# Patient Record
Sex: FEMALE | Race: WHITE | NOT HISPANIC OR LATINO | Employment: OTHER | URBAN - METROPOLITAN AREA
[De-identification: names, ages, dates, MRNs, and addresses within clinical notes are randomized per-mention and may not be internally consistent; named-entity substitution may affect disease eponyms.]

---

## 2017-02-23 ENCOUNTER — COMMUNICATION - HEALTHEAST (OUTPATIENT)
Dept: FAMILY MEDICINE | Facility: CLINIC | Age: 62
End: 2017-02-23

## 2017-08-21 ENCOUNTER — COMMUNICATION - HEALTHEAST (OUTPATIENT)
Dept: FAMILY MEDICINE | Facility: CLINIC | Age: 62
End: 2017-08-21

## 2017-08-21 ENCOUNTER — OFFICE VISIT - HEALTHEAST (OUTPATIENT)
Dept: SURGERY | Facility: CLINIC | Age: 62
End: 2017-08-21

## 2017-08-21 DIAGNOSIS — K80.20 SYMPTOMATIC CHOLELITHIASIS: ICD-10-CM

## 2017-08-21 ASSESSMENT — MIFFLIN-ST. JEOR: SCORE: 1351.01

## 2017-08-23 ENCOUNTER — AMBULATORY - HEALTHEAST (OUTPATIENT)
Dept: SURGERY | Facility: CLINIC | Age: 62
End: 2017-08-23

## 2017-08-28 ENCOUNTER — OFFICE VISIT - HEALTHEAST (OUTPATIENT)
Dept: FAMILY MEDICINE | Facility: CLINIC | Age: 62
End: 2017-08-28

## 2017-08-28 DIAGNOSIS — Z01.818 PRE-OP EXAM: ICD-10-CM

## 2017-08-28 DIAGNOSIS — K82.9 GALL BLADDER DISEASE: ICD-10-CM

## 2017-08-28 DIAGNOSIS — K29.60 REFLUX GASTRITIS: ICD-10-CM

## 2017-08-28 DIAGNOSIS — N32.81 OAB (OVERACTIVE BLADDER): ICD-10-CM

## 2017-08-28 ASSESSMENT — MIFFLIN-ST. JEOR: SCORE: 1361.9

## 2017-08-31 ENCOUNTER — AMBULATORY - HEALTHEAST (OUTPATIENT)
Dept: VASCULAR SURGERY | Facility: CLINIC | Age: 62
End: 2017-08-31

## 2017-08-31 ENCOUNTER — RECORDS - HEALTHEAST (OUTPATIENT)
Dept: ADMINISTRATIVE | Facility: OTHER | Age: 62
End: 2017-08-31

## 2017-08-31 DIAGNOSIS — K80.20 CALCULUS OF GALLBLADDER WITHOUT CHOLECYSTITIS WITHOUT OBSTRUCTION: ICD-10-CM

## 2017-09-06 ENCOUNTER — COMMUNICATION - HEALTHEAST (OUTPATIENT)
Dept: SURGERY | Facility: CLINIC | Age: 62
End: 2017-09-06

## 2020-03-09 ENCOUNTER — OFFICE VISIT - HEALTHEAST (OUTPATIENT)
Dept: FAMILY MEDICINE | Facility: CLINIC | Age: 65
End: 2020-03-09

## 2020-03-09 DIAGNOSIS — Z72.0 BRONCHITIS DUE TO TOBACCO USE: ICD-10-CM

## 2020-03-09 DIAGNOSIS — J40 BRONCHITIS DUE TO TOBACCO USE: ICD-10-CM

## 2020-03-09 DIAGNOSIS — R19.4 BOWEL HABIT CHANGES: ICD-10-CM

## 2020-03-09 DIAGNOSIS — Z00.00 ROUTINE GENERAL MEDICAL EXAMINATION AT A HEALTH CARE FACILITY: ICD-10-CM

## 2020-03-09 DIAGNOSIS — Z12.11 SCREEN FOR COLON CANCER: ICD-10-CM

## 2020-03-09 DIAGNOSIS — Z12.31 VISIT FOR SCREENING MAMMOGRAM: ICD-10-CM

## 2020-03-09 LAB
ALBUMIN SERPL-MCNC: 4 G/DL (ref 3.5–5)
ALP SERPL-CCNC: 103 U/L (ref 45–120)
ALT SERPL W P-5'-P-CCNC: 13 U/L (ref 0–45)
ANION GAP SERPL CALCULATED.3IONS-SCNC: 10 MMOL/L (ref 5–18)
AST SERPL W P-5'-P-CCNC: 20 U/L (ref 0–40)
ATRIAL RATE - MUSE: 99 BPM
BILIRUB SERPL-MCNC: 0.5 MG/DL (ref 0–1)
BUN SERPL-MCNC: 7 MG/DL (ref 8–22)
CALCIUM SERPL-MCNC: 9.9 MG/DL (ref 8.5–10.5)
CHLORIDE BLD-SCNC: 102 MMOL/L (ref 98–107)
CO2 SERPL-SCNC: 27 MMOL/L (ref 22–31)
CREAT SERPL-MCNC: 0.88 MG/DL (ref 0.6–1.1)
DIASTOLIC BLOOD PRESSURE - MUSE: NORMAL
ERYTHROCYTE [DISTWIDTH] IN BLOOD BY AUTOMATED COUNT: 11.9 % (ref 11–14.5)
GFR SERPL CREATININE-BSD FRML MDRD: >60 ML/MIN/1.73M2
GLUCOSE BLD-MCNC: 107 MG/DL (ref 70–125)
HCT VFR BLD AUTO: 41.6 % (ref 35–47)
HGB BLD-MCNC: 13.9 G/DL (ref 12–16)
INTERPRETATION ECG - MUSE: NORMAL
MCH RBC QN AUTO: 33.1 PG (ref 27–34)
MCHC RBC AUTO-ENTMCNC: 33.5 G/DL (ref 32–36)
MCV RBC AUTO: 99 FL (ref 80–100)
P AXIS - MUSE: 53 DEGREES
PLATELET # BLD AUTO: 268 THOU/UL (ref 140–440)
PMV BLD AUTO: 8.1 FL (ref 7–10)
POTASSIUM BLD-SCNC: 4.6 MMOL/L (ref 3.5–5)
PR INTERVAL - MUSE: 106 MS
PROT SERPL-MCNC: 7.8 G/DL (ref 6–8)
QRS DURATION - MUSE: 86 MS
QT - MUSE: 348 MS
QTC - MUSE: 446 MS
R AXIS - MUSE: 70 DEGREES
RBC # BLD AUTO: 4.2 MILL/UL (ref 3.8–5.4)
SODIUM SERPL-SCNC: 139 MMOL/L (ref 136–145)
SYSTOLIC BLOOD PRESSURE - MUSE: NORMAL
T AXIS - MUSE: 47 DEGREES
TSH SERPL DL<=0.005 MIU/L-ACNC: 1.25 UIU/ML (ref 0.3–5)
VENTRICULAR RATE- MUSE: 99 BPM
WBC: 6.8 THOU/UL (ref 4–11)

## 2020-03-09 ASSESSMENT — ANXIETY QUESTIONNAIRES
3. WORRYING TOO MUCH ABOUT DIFFERENT THINGS: NEARLY EVERY DAY
IF YOU CHECKED OFF ANY PROBLEMS ON THIS QUESTIONNAIRE, HOW DIFFICULT HAVE THESE PROBLEMS MADE IT FOR YOU TO DO YOUR WORK, TAKE CARE OF THINGS AT HOME, OR GET ALONG WITH OTHER PEOPLE: VERY DIFFICULT
2. NOT BEING ABLE TO STOP OR CONTROL WORRYING: SEVERAL DAYS
1. FEELING NERVOUS, ANXIOUS, OR ON EDGE: NEARLY EVERY DAY
5. BEING SO RESTLESS THAT IT IS HARD TO SIT STILL: NEARLY EVERY DAY
7. FEELING AFRAID AS IF SOMETHING AWFUL MIGHT HAPPEN: SEVERAL DAYS
6. BECOMING EASILY ANNOYED OR IRRITABLE: NEARLY EVERY DAY
4. TROUBLE RELAXING: NEARLY EVERY DAY
GAD7 TOTAL SCORE: 17

## 2020-03-09 ASSESSMENT — PATIENT HEALTH QUESTIONNAIRE - PHQ9: SUM OF ALL RESPONSES TO PHQ QUESTIONS 1-9: 13

## 2020-03-09 ASSESSMENT — MIFFLIN-ST. JEOR: SCORE: 1267.1

## 2020-03-14 ENCOUNTER — HOSPITAL ENCOUNTER (OUTPATIENT)
Dept: CT IMAGING | Facility: CLINIC | Age: 65
Discharge: HOME | End: 2020-03-14
Attending: FAMILY MEDICINE

## 2020-03-14 DIAGNOSIS — J40 BRONCHITIS DUE TO TOBACCO USE: ICD-10-CM

## 2020-03-14 DIAGNOSIS — Z72.0 BRONCHITIS DUE TO TOBACCO USE: ICD-10-CM

## 2020-03-16 ENCOUNTER — COMMUNICATION - HEALTHEAST (OUTPATIENT)
Dept: FAMILY MEDICINE | Facility: CLINIC | Age: 65
End: 2020-03-16

## 2020-03-18 ENCOUNTER — OFFICE VISIT - HEALTHEAST (OUTPATIENT)
Dept: FAMILY MEDICINE | Facility: CLINIC | Age: 65
End: 2020-03-18

## 2020-03-18 DIAGNOSIS — F41.9 ANXIETY AND DEPRESSION: ICD-10-CM

## 2020-03-18 DIAGNOSIS — J41.0 SIMPLE CHRONIC BRONCHITIS (H): ICD-10-CM

## 2020-03-18 DIAGNOSIS — F32.A ANXIETY AND DEPRESSION: ICD-10-CM

## 2020-03-18 DIAGNOSIS — Z72.0 TOBACCO ABUSE: ICD-10-CM

## 2020-07-20 ENCOUNTER — COMMUNICATION - HEALTHEAST (OUTPATIENT)
Dept: SCHEDULING | Facility: CLINIC | Age: 65
End: 2020-07-20

## 2020-07-21 ENCOUNTER — COMMUNICATION - HEALTHEAST (OUTPATIENT)
Dept: FAMILY MEDICINE | Facility: CLINIC | Age: 65
End: 2020-07-21

## 2020-07-21 ENCOUNTER — OFFICE VISIT - HEALTHEAST (OUTPATIENT)
Dept: FAMILY MEDICINE | Facility: CLINIC | Age: 65
End: 2020-07-21

## 2020-07-21 DIAGNOSIS — F41.1 GENERALIZED ANXIETY DISORDER: ICD-10-CM

## 2020-07-21 DIAGNOSIS — R13.10 DYSPHAGIA, UNSPECIFIED TYPE: ICD-10-CM

## 2020-07-21 DIAGNOSIS — M79.641 PAIN IN BOTH HANDS: ICD-10-CM

## 2020-07-21 DIAGNOSIS — R47.89 SPELL OF CHANGE IN SPEECH: ICD-10-CM

## 2020-07-21 DIAGNOSIS — M79.642 PAIN IN BOTH HANDS: ICD-10-CM

## 2020-07-21 LAB
ALBUMIN SERPL-MCNC: 4.2 G/DL (ref 3.5–5)
ALP SERPL-CCNC: 97 U/L (ref 45–120)
ALT SERPL W P-5'-P-CCNC: 12 U/L (ref 0–45)
ANION GAP SERPL CALCULATED.3IONS-SCNC: 11 MMOL/L (ref 5–18)
AST SERPL W P-5'-P-CCNC: 19 U/L (ref 0–40)
BILIRUB SERPL-MCNC: 0.3 MG/DL (ref 0–1)
BUN SERPL-MCNC: 7 MG/DL (ref 8–22)
CALCIUM SERPL-MCNC: 9.8 MG/DL (ref 8.5–10.5)
CHLORIDE BLD-SCNC: 103 MMOL/L (ref 98–107)
CO2 SERPL-SCNC: 26 MMOL/L (ref 22–31)
CREAT SERPL-MCNC: 0.77 MG/DL (ref 0.6–1.1)
ERYTHROCYTE [DISTWIDTH] IN BLOOD BY AUTOMATED COUNT: 13.2 % (ref 11–14.5)
GFR SERPL CREATININE-BSD FRML MDRD: >60 ML/MIN/1.73M2
GLUCOSE BLD-MCNC: 94 MG/DL (ref 70–125)
HCT VFR BLD AUTO: 44.8 % (ref 35–47)
HGB BLD-MCNC: 14.5 G/DL (ref 12–16)
MCH RBC QN AUTO: 32.1 PG (ref 27–34)
MCHC RBC AUTO-ENTMCNC: 32.4 G/DL (ref 32–36)
MCV RBC AUTO: 99 FL (ref 80–100)
PLATELET # BLD AUTO: 273 THOU/UL (ref 140–440)
PMV BLD AUTO: 12.1 FL (ref 8.5–12.5)
POTASSIUM BLD-SCNC: 5.1 MMOL/L (ref 3.5–5)
PROT SERPL-MCNC: 8 G/DL (ref 6–8)
RBC # BLD AUTO: 4.52 MILL/UL (ref 3.8–5.4)
SODIUM SERPL-SCNC: 140 MMOL/L (ref 136–145)
TSH SERPL DL<=0.005 MIU/L-ACNC: 1.32 UIU/ML (ref 0.3–5)
WBC: 8.1 THOU/UL (ref 4–11)

## 2020-07-21 ASSESSMENT — ANXIETY QUESTIONNAIRES
5. BEING SO RESTLESS THAT IT IS HARD TO SIT STILL: NEARLY EVERY DAY
GAD7 TOTAL SCORE: 19
6. BECOMING EASILY ANNOYED OR IRRITABLE: SEVERAL DAYS
3. WORRYING TOO MUCH ABOUT DIFFERENT THINGS: NEARLY EVERY DAY
2. NOT BEING ABLE TO STOP OR CONTROL WORRYING: NEARLY EVERY DAY
4. TROUBLE RELAXING: NEARLY EVERY DAY
1. FEELING NERVOUS, ANXIOUS, OR ON EDGE: NEARLY EVERY DAY
7. FEELING AFRAID AS IF SOMETHING AWFUL MIGHT HAPPEN: NEARLY EVERY DAY

## 2020-07-21 ASSESSMENT — PATIENT HEALTH QUESTIONNAIRE - PHQ9: SUM OF ALL RESPONSES TO PHQ QUESTIONS 1-9: 19

## 2020-07-31 ENCOUNTER — AMBULATORY - HEALTHEAST (OUTPATIENT)
Dept: FAMILY MEDICINE | Facility: CLINIC | Age: 65
End: 2020-07-31

## 2020-08-04 ENCOUNTER — HOSPITAL ENCOUNTER (OUTPATIENT)
Dept: CT IMAGING | Facility: CLINIC | Age: 65
Discharge: HOME | End: 2020-08-04
Attending: FAMILY MEDICINE

## 2020-08-04 DIAGNOSIS — R47.89 SPELL OF CHANGE IN SPEECH: ICD-10-CM

## 2020-08-04 DIAGNOSIS — R13.10 DYSPHAGIA, UNSPECIFIED TYPE: ICD-10-CM

## 2020-08-05 ENCOUNTER — AMBULATORY - HEALTHEAST (OUTPATIENT)
Dept: FAMILY MEDICINE | Facility: CLINIC | Age: 65
End: 2020-08-05

## 2020-08-05 DIAGNOSIS — R93.89 ABNORMAL CT SCAN, NECK: ICD-10-CM

## 2020-08-13 ENCOUNTER — RECORDS - HEALTHEAST (OUTPATIENT)
Dept: ADMINISTRATIVE | Facility: OTHER | Age: 65
End: 2020-08-13

## 2020-08-20 ENCOUNTER — OFFICE VISIT - HEALTHEAST (OUTPATIENT)
Dept: OTOLARYNGOLOGY | Facility: CLINIC | Age: 65
End: 2020-08-20

## 2020-08-20 DIAGNOSIS — R13.10 DYSPHAGIA, UNSPECIFIED TYPE: ICD-10-CM

## 2020-08-20 DIAGNOSIS — Z72.0 TOBACCO ABUSE DISORDER: ICD-10-CM

## 2020-08-20 DIAGNOSIS — K21.9 LPRD (LARYNGOPHARYNGEAL REFLUX DISEASE): ICD-10-CM

## 2020-08-20 DIAGNOSIS — R09.A2 GLOBUS SENSATION: ICD-10-CM

## 2020-08-20 DIAGNOSIS — R49.0 HOARSENESS: ICD-10-CM

## 2021-01-01 ENCOUNTER — LAB (OUTPATIENT)
Dept: LAB | Facility: CLINIC | Age: 66
End: 2021-01-01
Payer: COMMERCIAL

## 2021-01-01 ENCOUNTER — OFFICE VISIT (OUTPATIENT)
Dept: NEUROLOGY | Facility: CLINIC | Age: 66
End: 2021-01-01
Payer: COMMERCIAL

## 2021-01-01 ENCOUNTER — DOCUMENTATION ONLY (OUTPATIENT)
Dept: NEUROLOGY | Facility: CLINIC | Age: 66
End: 2021-01-01

## 2021-01-01 ENCOUNTER — THERAPY VISIT (OUTPATIENT)
Dept: SPEECH THERAPY | Facility: CLINIC | Age: 66
End: 2021-01-01
Payer: COMMERCIAL

## 2021-01-01 ENCOUNTER — ALLIED HEALTH/NURSE VISIT (OUTPATIENT)
Dept: NEUROLOGY | Facility: CLINIC | Age: 66
End: 2021-01-01

## 2021-01-01 VITALS
SYSTOLIC BLOOD PRESSURE: 152 MMHG | RESPIRATION RATE: 16 BRPM | BODY MASS INDEX: 25.13 KG/M2 | OXYGEN SATURATION: 96 % | DIASTOLIC BLOOD PRESSURE: 87 MMHG | HEART RATE: 69 BPM | WEIGHT: 146.4 LBS

## 2021-01-01 DIAGNOSIS — R13.12 OROPHARYNGEAL DYSPHAGIA: ICD-10-CM

## 2021-01-01 DIAGNOSIS — G12.21 ALS (AMYOTROPHIC LATERAL SCLEROSIS) (H): ICD-10-CM

## 2021-01-01 DIAGNOSIS — G12.21 ALS (AMYOTROPHIC LATERAL SCLEROSIS) (H): Primary | ICD-10-CM

## 2021-01-01 DIAGNOSIS — T88.7XXA MEDICATION SIDE EFFECTS: Primary | ICD-10-CM

## 2021-01-01 DIAGNOSIS — T88.7XXA MEDICATION SIDE EFFECTS: ICD-10-CM

## 2021-01-01 DIAGNOSIS — R47.1 DYSARTHRIA: ICD-10-CM

## 2021-01-01 DIAGNOSIS — K11.7 SIALORRHEA: ICD-10-CM

## 2021-01-01 LAB
ALBUMIN SERPL-MCNC: 3.7 G/DL (ref 3.4–5)
ALP SERPL-CCNC: 167 U/L (ref 40–150)
ALT SERPL W P-5'-P-CCNC: 40 U/L (ref 0–50)
AST SERPL W P-5'-P-CCNC: 33 U/L (ref 0–45)
BILIRUB DIRECT SERPL-MCNC: 0.2 MG/DL (ref 0–0.2)
BILIRUB SERPL-MCNC: 0.5 MG/DL (ref 0.2–1.3)
PROT SERPL-MCNC: 8 G/DL (ref 6.8–8.8)

## 2021-01-01 PROCEDURE — 94375 RESPIRATORY FLOW VOLUME LOOP: CPT | Performed by: INTERNAL MEDICINE

## 2021-01-01 PROCEDURE — 80076 HEPATIC FUNCTION PANEL: CPT | Performed by: PATHOLOGY

## 2021-01-01 PROCEDURE — 36415 COLL VENOUS BLD VENIPUNCTURE: CPT | Performed by: PATHOLOGY

## 2021-01-01 PROCEDURE — 92522 EVALUATE SPEECH PRODUCTION: CPT | Mod: GN | Performed by: SPEECH-LANGUAGE PATHOLOGIST

## 2021-01-01 PROCEDURE — 92526 ORAL FUNCTION THERAPY: CPT | Mod: GN | Performed by: SPEECH-LANGUAGE PATHOLOGIST

## 2021-01-01 PROCEDURE — 99215 OFFICE O/P EST HI 40 MIN: CPT | Mod: GC | Performed by: PSYCHIATRY & NEUROLOGY

## 2021-01-01 PROCEDURE — 92610 EVALUATE SWALLOWING FUNCTION: CPT | Mod: GN | Performed by: SPEECH-LANGUAGE PATHOLOGIST

## 2021-01-01 RX ORDER — ATROPINE SULFATE 10 MG/ML
1-2 SOLUTION/ DROPS OPHTHALMIC 2 TIMES DAILY PRN
Qty: 5 ML | Refills: 1 | Status: SHIPPED | OUTPATIENT
Start: 2021-01-01 | End: 2022-01-01

## 2021-01-01 RX ORDER — RILUZOLE 50 MG/1
50 TABLET, FILM COATED ORAL EVERY 12 HOURS
Qty: 60 TABLET | Refills: 2 | Status: ON HOLD | OUTPATIENT
Start: 2021-01-01 | End: 2022-01-01

## 2021-01-01 ASSESSMENT — REVISED AMYOTROPHIC LATERAL SCLEROSIS FUNCTIONAL RATING SCALE (ALSFRS-R)
ORTHOPENA: 4
SWALLOWING: 2
SPEECH: LOSS OF USEFUL SPEECH
HANDWRITING: NORMAL
HANDWRITING: 4
SALIVATION: 1
TURNING_IN_BED_AND_ADJUSTING_BED_CLOTHES: 3
TURNING_IN_BED_AND_ADJUSTING_BED_CLOTHES: SOMEWHAT SLOW AND CLUMSY, BUT NO HELP NEEDED
DYSPNEA: 4
GROSS_MOTOR_SUBTOTAL_SCORE: 10
DRESSING_AND_HYGEINE: 4
SPEECH: 0
WALKING: NORMAL
RESPIRATORY_SUBTOTAL_SCORE: 12
FINE_MOTOR_SUBTOTAL_SCORE: 12
ALSFRS_TOTAL_SCORE: 37
CLIMBING_STAIRS: SLOW
SALIVATION: MARKED EXCESS OF SALIVA WITH SOME DROOLING
RESPIRATORY_INSUFFICIENCY: 4
WALKING: 4
CLIMBING_STAIRS: 3
DRESSING_AND_HYGEINE: NORMAL FUNCTION
SWALLOWING: DIETARY CONSISTENCY CHANGES
SIX_ITEM_SUBTOTAL: 19
BULBAR_SUBTOTAL: 3
CUTTING_FOOD_AND_HANDLING_UTENSILES: 4

## 2021-01-01 ASSESSMENT — PAIN SCALES - GENERAL: PAINLEVEL: NO PAIN (0)

## 2021-01-15 ENCOUNTER — RECORDS - HEALTHEAST (OUTPATIENT)
Dept: ADMINISTRATIVE | Facility: OTHER | Age: 66
End: 2021-01-15

## 2021-01-27 ENCOUNTER — COMMUNICATION - HEALTHEAST (OUTPATIENT)
Dept: OTOLARYNGOLOGY | Facility: CLINIC | Age: 66
End: 2021-01-27

## 2021-02-11 ENCOUNTER — COMMUNICATION - HEALTHEAST (OUTPATIENT)
Dept: OTOLARYNGOLOGY | Facility: CLINIC | Age: 66
End: 2021-02-11

## 2021-02-11 DIAGNOSIS — K21.9 LPRD (LARYNGOPHARYNGEAL REFLUX DISEASE): ICD-10-CM

## 2021-02-19 ENCOUNTER — OFFICE VISIT - HEALTHEAST (OUTPATIENT)
Dept: FAMILY MEDICINE | Facility: CLINIC | Age: 66
End: 2021-02-19

## 2021-02-19 DIAGNOSIS — R29.818 OTHER SYMPTOMS AND SIGNS INVOLVING THE NERVOUS SYSTEM: ICD-10-CM

## 2021-02-19 DIAGNOSIS — R13.10 DYSPHAGIA, UNSPECIFIED TYPE: ICD-10-CM

## 2021-02-19 DIAGNOSIS — R47.1 DYSARTHRIA: ICD-10-CM

## 2021-02-19 DIAGNOSIS — Z72.0 TOBACCO ABUSE: ICD-10-CM

## 2021-02-19 LAB
ALBUMIN SERPL-MCNC: 4.1 G/DL (ref 3.5–5)
ALP SERPL-CCNC: 156 U/L (ref 45–120)
ALT SERPL W P-5'-P-CCNC: 60 U/L (ref 0–45)
ANION GAP SERPL CALCULATED.3IONS-SCNC: 10 MMOL/L (ref 5–18)
AST SERPL W P-5'-P-CCNC: 32 U/L (ref 0–40)
BILIRUB SERPL-MCNC: 0.3 MG/DL (ref 0–1)
BUN SERPL-MCNC: 15 MG/DL (ref 8–22)
C REACTIVE PROTEIN LHE: 0.1 MG/DL (ref 0–0.8)
CALCIUM SERPL-MCNC: 9.7 MG/DL (ref 8.5–10.5)
CHLORIDE BLD-SCNC: 104 MMOL/L (ref 98–107)
CO2 SERPL-SCNC: 28 MMOL/L (ref 22–31)
CREAT SERPL-MCNC: 0.77 MG/DL (ref 0.6–1.1)
ERYTHROCYTE [DISTWIDTH] IN BLOOD BY AUTOMATED COUNT: 13.9 % (ref 11–14.5)
ERYTHROCYTE [SEDIMENTATION RATE] IN BLOOD BY WESTERGREN METHOD: 20 MM/HR (ref 0–20)
GFR SERPL CREATININE-BSD FRML MDRD: >60 ML/MIN/1.73M2
GLUCOSE BLD-MCNC: 99 MG/DL (ref 70–125)
HCT VFR BLD AUTO: 39.9 % (ref 35–47)
HGB BLD-MCNC: 13.1 G/DL (ref 12–16)
MCH RBC QN AUTO: 32.6 PG (ref 27–34)
MCHC RBC AUTO-ENTMCNC: 32.8 G/DL (ref 32–36)
MCV RBC AUTO: 99 FL (ref 80–100)
PLATELET # BLD AUTO: 259 THOU/UL (ref 140–440)
PMV BLD AUTO: 10.5 FL (ref 7–10)
POTASSIUM BLD-SCNC: 5.2 MMOL/L (ref 3.5–5)
PROT SERPL-MCNC: 7.4 G/DL (ref 6–8)
RBC # BLD AUTO: 4.02 MILL/UL (ref 3.8–5.4)
SODIUM SERPL-SCNC: 142 MMOL/L (ref 136–145)
TSH SERPL DL<=0.005 MIU/L-ACNC: 1.37 UIU/ML (ref 0.3–5)
VIT B12 SERPL-MCNC: 564 PG/ML (ref 213–816)
WBC: 5.7 THOU/UL (ref 4–11)

## 2021-02-22 ENCOUNTER — COMMUNICATION - HEALTHEAST (OUTPATIENT)
Dept: FAMILY MEDICINE | Facility: CLINIC | Age: 66
End: 2021-02-22

## 2021-02-24 ENCOUNTER — HOSPITAL ENCOUNTER (OUTPATIENT)
Dept: MRI IMAGING | Facility: CLINIC | Age: 66
Discharge: HOME | End: 2021-02-24
Attending: FAMILY MEDICINE

## 2021-02-24 DIAGNOSIS — R47.1 DYSARTHRIA: ICD-10-CM

## 2021-02-24 DIAGNOSIS — R29.818 OTHER SYMPTOMS AND SIGNS INVOLVING THE NERVOUS SYSTEM: ICD-10-CM

## 2021-02-25 ENCOUNTER — OFFICE VISIT - HEALTHEAST (OUTPATIENT)
Dept: OTOLARYNGOLOGY | Facility: CLINIC | Age: 66
End: 2021-02-25

## 2021-02-25 DIAGNOSIS — G12.22 BULBAR PALSY (H): ICD-10-CM

## 2021-02-26 ENCOUNTER — COMMUNICATION - HEALTHEAST (OUTPATIENT)
Dept: FAMILY MEDICINE | Facility: CLINIC | Age: 66
End: 2021-02-26

## 2021-02-26 PROBLEM — R31.9 HEMATURIA: Status: ACTIVE | Noted: 2021-02-26

## 2021-02-26 PROBLEM — N32.81 OAB (OVERACTIVE BLADDER): Status: ACTIVE | Noted: 2017-08-28

## 2021-02-26 PROBLEM — R93.89 ABNORMAL CT SCAN, NECK: Status: ACTIVE | Noted: 2020-08-05

## 2021-02-26 PROBLEM — R91.1 SOLITARY PULMONARY NODULE: Status: ACTIVE | Noted: 2021-02-26

## 2021-03-01 ENCOUNTER — COMMUNICATION - HEALTHEAST (OUTPATIENT)
Dept: PHARMACY | Facility: CLINIC | Age: 66
End: 2021-03-01

## 2021-03-01 ENCOUNTER — COMMUNICATION - HEALTHEAST (OUTPATIENT)
Dept: SCHEDULING | Facility: CLINIC | Age: 66
End: 2021-03-01

## 2021-03-01 ENCOUNTER — AMBULATORY - HEALTHEAST (OUTPATIENT)
Dept: OTOLARYNGOLOGY | Facility: CLINIC | Age: 66
End: 2021-03-01

## 2021-03-01 ENCOUNTER — AMBULATORY - HEALTHEAST (OUTPATIENT)
Dept: LAB | Facility: HOSPITAL | Age: 66
End: 2021-03-01

## 2021-03-01 ENCOUNTER — OFFICE VISIT (OUTPATIENT)
Dept: NEUROLOGY | Facility: CLINIC | Age: 66
End: 2021-03-01
Payer: COMMERCIAL

## 2021-03-01 ENCOUNTER — RECORDS - HEALTHEAST (OUTPATIENT)
Dept: ADMINISTRATIVE | Facility: OTHER | Age: 66
End: 2021-03-01

## 2021-03-01 VITALS
WEIGHT: 160 LBS | BODY MASS INDEX: 27.31 KG/M2 | RESPIRATION RATE: 16 BRPM | HEART RATE: 96 BPM | HEIGHT: 64 IN | SYSTOLIC BLOOD PRESSURE: 134 MMHG | DIASTOLIC BLOOD PRESSURE: 90 MMHG

## 2021-03-01 DIAGNOSIS — E83.00 DISORDER OF COPPER METABOLISM, UNSPECIFIED (H): ICD-10-CM

## 2021-03-01 DIAGNOSIS — R29.2 HYPERREFLEXIA: ICD-10-CM

## 2021-03-01 DIAGNOSIS — G12.22 BULBAR PALSY (H): ICD-10-CM

## 2021-03-01 DIAGNOSIS — G12.22 BULBAR PALSY (H): Primary | ICD-10-CM

## 2021-03-01 DIAGNOSIS — K21.9 LPRD (LARYNGOPHARYNGEAL REFLUX DISEASE): ICD-10-CM

## 2021-03-01 DIAGNOSIS — R13.10 DYSPHAGIA, UNSPECIFIED TYPE: ICD-10-CM

## 2021-03-01 DIAGNOSIS — E83.00 DISORDER OF COPPER METABOLISM (H): ICD-10-CM

## 2021-03-01 LAB
CALCIUM, IONIZED MEASURED: 1.2 MMOL/L (ref 1.11–1.3)
CK SERPL-CCNC: 69 U/L (ref 30–190)
ION CA PH 7.4: 1.16 MMOL/L (ref 1.11–1.3)
PH: 7.34 (ref 7.35–7.45)
VIT B12 SERPL-MCNC: 604 PG/ML (ref 213–816)

## 2021-03-01 PROCEDURE — 99204 OFFICE O/P NEW MOD 45 MIN: CPT | Performed by: PSYCHIATRY & NEUROLOGY

## 2021-03-01 RX ORDER — ASPIRIN 81 MG
1 TABLET, DELAYED RELEASE (ENTERIC COATED) ORAL DAILY
Status: ON HOLD | COMMUNITY
End: 2022-01-01

## 2021-03-01 RX ORDER — ASPIRIN 81 MG/1
81 TABLET, CHEWABLE ORAL DAILY
Status: ON HOLD | COMMUNITY
End: 2022-01-01

## 2021-03-01 ASSESSMENT — MIFFLIN-ST. JEOR: SCORE: 1255.76

## 2021-03-01 NOTE — PROGRESS NOTES
[facility] Neurology  [place]    Bailey Malhotra MRN# [id number]   Age: 66 year old YOB: 1955               Assessment and Plan:   Assessment:   Dysphagia/dysarthria -- unclear etiology        Plan:   Orders Placed This Encounter   Procedures     Vitamin B12     CK total     Calcium Ionized ([facility])     Copper Serum ([facility])     Paraneoplastic Antibodies with Reflex     ACETYLCHOLINE RECEPTOR BINDING     STRIATED MUSCLE ANTIBODY IGG     ACETYLCHOLINE MODULATING ANTIBODY     ACETYLCHOLINE RECEPTOR BLOCKING JIMMY     While she has not noticed any weakness in the extremities or fasciculations, her symptoms are still concerning.  She will have the swallow evaluation done that was previously ordered.  If the above work-up is unrevealing we will have her come back for EMG testing.  We will be in touch with her.             Chief Complaint/HPI:     I saw Bailey for neurologic evaluation today here in our [place] office.  This is a 66-year-old woman with about a 6-month history of increasing swallowing difficulty and dysarthria.  Sometimes it is harder to swallow pills.  She is sometimes coughing up liquids.  She did have an MRI of the brain and that was unremarkable.  She saw ENT, no clear cause of her dysphagia was noted though swallow evaluation has been ordered.  She has not noticed any double vision.  Symptoms do not seem to get worse as the day goes on.  She has not noticed any weakness in the extremities or balance problems.            Past Medical History:    has no past medical history on file.          Past Surgical History:    has no past surgical history on file.          Social History:     Social History     Tobacco Use     Smoking status: Current Every Day Smoker     Packs/day: 1.00     Years: 40.00     Pack years: 40.00     Types: Cigarettes     Smokeless tobacco: Never Used   Substance Use Topics     Alcohol use: Yes     Comment: 6-12 beers on the weekend              Family  "History:     Family History   Problem Relation Age of Onset     No Known Problems Mother      No Known Problems Father                 Allergies:   No Known Allergies          Medications:     Current Outpatient Medications:      aspirin (ASA) 81 MG chewable tablet, Take 81 mg by mouth daily, Disp: , Rfl:      calcium carbonate 600 mg-vitamin D 400 units (CALTRATE) 600-400 MG-UNIT per tablet, Take 1 tablet by mouth daily, Disp: , Rfl:      diphenhydrAMINE-acetaminophen (TYLENOL PM)  MG tablet, Take 2 tablets by mouth daily, Disp: , Rfl:      multivitamin, therapeutic with minerals (THERA-VIT-M) TABS tablet, Take 1 tablet by mouth daily, Disp: , Rfl:               Physical Exam:      Vitals: BP (!) 134/90 (BP Location: Right arm, Patient Position: Sitting, Cuff Size: Adult Regular)   Pulse 96   Resp 16   Ht 1.626 m (5' 4\")   Wt 72.6 kg (160 lb)   LMP  (LMP Unknown)   Breastfeeding No   BMI 27.46 kg/m    BMI= Body mass index is 27.46 kg/m .     Patient is alert and oriented x 3 in no acute distress. Neck was supple, no carotid bruits, thyromegaly, lymphadenopathy or JVD noted.   Neurological Exam:   Mental status: Patient is alert and oriented x 3.  There is no aphasia, but voice is moderately dysarthric   Cranial nerves:    CN II: Visual fields are full to confrontation. Fundoscopic exam is normal. PERRLA.   CN III, IV, VI: EOMI.    CN V: Facial sensation intact to pinprick in all 3 divisions bilaterally.    CN VII: Face is symmetric with normal eye closure and smile.   CN VII: Hearing is normal to rubbing fingers   CN IX, X: Palate elevates symmetrically. Phonation is normal. Gag present.   CN XI: Head turning and shoulder shrug are intact   CN XII: Tongue is midline with a little lateral weakness, no atrophy or fasciculations.   Motor: Muscle bulk and tone are normal. No pronator drift. Strength is 5/5 bilaterally. No fasciculations noted.   Reflexes: Reflexes are brisk throughout.    Sensory: Light " touch, pinprick, and vibration sense are intact bilaterally.    Coordination: Rapid alternating movements and fine finger movements are intact. There is no dysmetria on finger-to-nose   Gait: Posture is normal. Gait is steady with normal steps, base, arm swing, and turning, independent.    (Dr. Flanagan did note tongue fasciculations on his exam)             Sonido Barriga MD

## 2021-03-01 NOTE — LETTER
3/1/2021         RE: Bailey Malhotra  [address] [facility] [address]        Dear Colleague,    Thank you for referring your patient, Bailey Malhotra, to the [facility] NEUROLOGY CLINIC [place]. Please see a copy of my visit note below.    [facility] Neurology  [place]    Bailey Malhotra MRN# [id number]   Age: 66 year old YOB: 1955               Assessment and Plan:   Assessment:   Dysphagia/dysarthria -- unclear etiology        Plan:   Orders Placed This Encounter   Procedures     Vitamin B12     CK total     Calcium Ionized ([facility])     Copper Serum ([facility])     Paraneoplastic Antibodies with Reflex     ACETYLCHOLINE RECEPTOR BINDING     STRIATED MUSCLE ANTIBODY IGG     ACETYLCHOLINE MODULATING ANTIBODY     ACETYLCHOLINE RECEPTOR BLOCKING JIMMY     While she has not noticed any weakness in the extremities or fasciculations, her symptoms are still concerning.  She will have the swallow evaluation done that was previously ordered.  If the above work-up is unrevealing we will have her come back for EMG testing.  We will be in touch with her.             Chief Complaint/HPI:     I saw Bailey for neurologic evaluation today here in our [place] office.  This is a 66-year-old woman with about a 6-month history of increasing swallowing difficulty and dysarthria.  Sometimes it is harder to swallow pills.  She is sometimes coughing up liquids.  She did have an MRI of the brain and that was unremarkable.  She saw ENT, no clear cause of her dysphagia was noted though swallow evaluation has been ordered.  She has not noticed any double vision.  Symptoms do not seem to get worse as the day goes on.  She has not noticed any weakness in the extremities or balance problems.            Past Medical History:    has no past medical history on file.          Past Surgical History:    has no past surgical history on file.          Social History:     Social History     Tobacco Use      "Smoking status: Current Every Day Smoker     Packs/day: 1.00     Years: 40.00     Pack years: 40.00     Types: Cigarettes     Smokeless tobacco: Never Used   Substance Use Topics     Alcohol use: Yes     Comment: 6-12 beers on the weekend              Family History:     Family History   Problem Relation Age of Onset     No Known Problems Mother      No Known Problems Father                 Allergies:   No Known Allergies          Medications:     Current Outpatient Medications:      aspirin (ASA) 81 MG chewable tablet, Take 81 mg by mouth daily, Disp: , Rfl:      calcium carbonate 600 mg-vitamin D 400 units (CALTRATE) 600-400 MG-UNIT per tablet, Take 1 tablet by mouth daily, Disp: , Rfl:      diphenhydrAMINE-acetaminophen (TYLENOL PM)  MG tablet, Take 2 tablets by mouth daily, Disp: , Rfl:      multivitamin, therapeutic with minerals (THERA-VIT-M) TABS tablet, Take 1 tablet by mouth daily, Disp: , Rfl:               Physical Exam:      Vitals: BP (!) 134/90 (BP Location: Right arm, Patient Position: Sitting, Cuff Size: Adult Regular)   Pulse 96   Resp 16   Ht 1.626 m (5' 4\")   Wt 72.6 kg (160 lb)   LMP  (LMP Unknown)   Breastfeeding No   BMI 27.46 kg/m    BMI= Body mass index is 27.46 kg/m .     Patient is alert and oriented x 3 in no acute distress. Neck was supple, no carotid bruits, thyromegaly, lymphadenopathy or JVD noted.   Neurological Exam:   Mental status: Patient is alert and oriented x 3.  There is no aphasia, but voice is moderately dysarthric   Cranial nerves:    CN II: Visual fields are full to confrontation. Fundoscopic exam is normal. PERRLA.   CN III, IV, VI: EOMI.    CN V: Facial sensation intact to pinprick in all 3 divisions bilaterally.    CN VII: Face is symmetric with normal eye closure and smile.   CN VII: Hearing is normal to rubbing fingers   CN IX, X: Palate elevates symmetrically. Phonation is normal. Gag present.   CN XI: Head turning and shoulder shrug are intact   CN XII: " Tongue is midline with a little lateral weakness, no atrophy or fasciculations.   Motor: Muscle bulk and tone are normal. No pronator drift. Strength is 5/5 bilaterally. No fasciculations noted.   Reflexes: Reflexes are brisk throughout.    Sensory: Light touch, pinprick, and vibration sense are intact bilaterally.    Coordination: Rapid alternating movements and fine finger movements are intact. There is no dysmetria on finger-to-nose   Gait: Posture is normal. Gait is steady with normal steps, base, arm swing, and turning, independent.    (Dr. Flanagan did note tongue fasciculations on his exam)             Sonido Barriga MD            Again, thank you for allowing me to participate in the care of your patient.        Sincerely,        Sonido Barriga MD

## 2021-03-01 NOTE — NURSING NOTE
Chief Complaint   Patient presents with     Consult     New patient consult for dysarthria x 6 months. she has seen ENT since seing PCP, was recommended to do swallow study however states she has not heard anything yet for scheduling      Stepan Yun CMA on 3/1/2021 at 10:25 AM

## 2021-03-02 ENCOUNTER — HOSPITAL ENCOUNTER (OUTPATIENT)
Dept: ADMINISTRATIVE | Facility: OTHER | Age: 66
Discharge: HOME | End: 2021-03-02

## 2021-03-03 LAB — COPPER SERPL-MCNC: 113 UG/DL (ref 80–155)

## 2021-03-08 ENCOUNTER — COMMUNICATION - HEALTHEAST (OUTPATIENT)
Dept: FAMILY MEDICINE | Facility: CLINIC | Age: 66
End: 2021-03-08

## 2021-03-08 LAB
AMPHIPHYSIN AB TITR SER: NEGATIVE TITER
CV2 IGG TITR SER: NEGATIVE TITER
GLIAL NUC TYPE 1 AB TITR SER: NEGATIVE TITER
HU1 AB TITR SER: NEGATIVE TITER
HU2 AB TITR SER IF: NEGATIVE TITER
HU3 AB TITR SER: NEGATIVE TITER
IMMUNOLOGIST REVIEW: NORMAL
LABORATORY COMMENT REPORT: NORMAL
NACHR AB SER-SCNC: 0 NMOL/L
PCA-1 AB TITR SER: NEGATIVE TITER
PCA-2 AB TITR SER: NEGATIVE TITER
PCA-TR AB TITR SER IF: NEGATIVE TITER
STRIA MUS AB TITR SER: NEGATIVE TITER
STRIA MUS AB TITR SER: NEGATIVE TITER
VGCC-N BIND AB SER-SCNC: 0 NMOL/L
VGCC-P/Q BIND AB SER-SCNC: 0 NMOL/L
VGKC AB SER-SCNC: 0 NMOL/L

## 2021-03-11 ENCOUNTER — TELEPHONE (OUTPATIENT)
Dept: NEUROLOGY | Facility: CLINIC | Age: 66
End: 2021-03-11

## 2021-03-11 DIAGNOSIS — G12.22 BULBAR PALSY (H): Primary | ICD-10-CM

## 2021-03-11 LAB
MISCELLANEOUS TEST DEPT. - HE HISTORICAL: NORMAL
PERFORMING LAB: NORMAL
SPECIMEN STATUS: NORMAL
TEST NAME: NORMAL

## 2021-03-11 NOTE — TELEPHONE ENCOUNTER
I spoke with Bailey on the phone today.  Lab work-up so far is unremarkable (myasthenia panel is negative, B12 is normal, ionized calcium is normal, total CK is normal.  Paraneoplastic panel is still pending.    Given her significant dysphagia and some swallowing difficulty I would like to have her come in for EMG testing to look for motor neuron disease.  I told her we will give her a call with an appointment time.

## 2021-03-25 ENCOUNTER — OFFICE VISIT - HEALTHEAST (OUTPATIENT)
Dept: FAMILY MEDICINE | Facility: CLINIC | Age: 66
End: 2021-03-25

## 2021-03-25 DIAGNOSIS — R13.10 DYSPHAGIA, UNSPECIFIED TYPE: ICD-10-CM

## 2021-03-25 DIAGNOSIS — L30.9 HAND DERMATITIS: ICD-10-CM

## 2021-03-25 DIAGNOSIS — Z72.0 TOBACCO ABUSE: ICD-10-CM

## 2021-03-25 DIAGNOSIS — R47.1 DYSARTHRIA: ICD-10-CM

## 2021-03-25 DIAGNOSIS — F41.1 GENERALIZED ANXIETY DISORDER: ICD-10-CM

## 2021-03-25 ASSESSMENT — ANXIETY QUESTIONNAIRES
5. BEING SO RESTLESS THAT IT IS HARD TO SIT STILL: SEVERAL DAYS
1. FEELING NERVOUS, ANXIOUS, OR ON EDGE: NEARLY EVERY DAY
7. FEELING AFRAID AS IF SOMETHING AWFUL MIGHT HAPPEN: NEARLY EVERY DAY
GAD7 TOTAL SCORE: 14
4. TROUBLE RELAXING: SEVERAL DAYS
3. WORRYING TOO MUCH ABOUT DIFFERENT THINGS: MORE THAN HALF THE DAYS
6. BECOMING EASILY ANNOYED OR IRRITABLE: MORE THAN HALF THE DAYS
2. NOT BEING ABLE TO STOP OR CONTROL WORRYING: MORE THAN HALF THE DAYS
IF YOU CHECKED OFF ANY PROBLEMS ON THIS QUESTIONNAIRE, HOW DIFFICULT HAVE THESE PROBLEMS MADE IT FOR YOU TO DO YOUR WORK, TAKE CARE OF THINGS AT HOME, OR GET ALONG WITH OTHER PEOPLE: NOT DIFFICULT AT ALL

## 2021-03-25 ASSESSMENT — PATIENT HEALTH QUESTIONNAIRE - PHQ9: SUM OF ALL RESPONSES TO PHQ QUESTIONS 1-9: 9

## 2021-03-30 ENCOUNTER — RECORDS - HEALTHEAST (OUTPATIENT)
Dept: ADMINISTRATIVE | Facility: OTHER | Age: 66
End: 2021-03-30

## 2021-03-31 ENCOUNTER — COMMUNICATION - HEALTHEAST (OUTPATIENT)
Dept: FAMILY MEDICINE | Facility: CLINIC | Age: 66
End: 2021-03-31

## 2021-04-01 ENCOUNTER — OFFICE VISIT (OUTPATIENT)
Dept: NEUROLOGY | Facility: CLINIC | Age: 66
End: 2021-04-01
Attending: PSYCHIATRY & NEUROLOGY
Payer: COMMERCIAL

## 2021-04-01 ENCOUNTER — RECORDS - HEALTHEAST (OUTPATIENT)
Dept: ADMINISTRATIVE | Facility: OTHER | Age: 66
End: 2021-04-01

## 2021-04-01 ENCOUNTER — TRANSCRIBE ORDERS (OUTPATIENT)
Dept: OTHER | Age: 66
End: 2021-04-01

## 2021-04-01 VITALS
BODY MASS INDEX: 27.4 KG/M2 | HEART RATE: 99 BPM | HEIGHT: 64 IN | DIASTOLIC BLOOD PRESSURE: 74 MMHG | SYSTOLIC BLOOD PRESSURE: 134 MMHG | WEIGHT: 160.5 LBS

## 2021-04-01 DIAGNOSIS — G12.22 BULBAR PALSY (H): Primary | ICD-10-CM

## 2021-04-01 DIAGNOSIS — G12.22 BULBAR PALSY (H): ICD-10-CM

## 2021-04-01 PROCEDURE — 95911 NRV CNDJ TEST 9-10 STUDIES: CPT | Performed by: PSYCHIATRY & NEUROLOGY

## 2021-04-01 PROCEDURE — 95886 MUSC TEST DONE W/N TEST COMP: CPT | Mod: RT | Performed by: PSYCHIATRY & NEUROLOGY

## 2021-04-01 PROCEDURE — 99214 OFFICE O/P EST MOD 30 MIN: CPT | Performed by: PSYCHIATRY & NEUROLOGY

## 2021-04-01 RX ORDER — FAMOTIDINE 20 MG/1
TABLET, FILM COATED ORAL
COMMUNITY
Start: 2021-03-31 | End: 2022-01-01

## 2021-04-01 RX ORDER — LANSOPRAZOLE 30 MG/1
CAPSULE, DELAYED RELEASE ORAL
COMMUNITY
Start: 2021-03-31 | End: 2022-01-01

## 2021-04-01 ASSESSMENT — MIFFLIN-ST. JEOR: SCORE: 1258.02

## 2021-04-01 NOTE — NURSING NOTE
Chief Complaint   Patient presents with     Dysphagia/dysarthria     Discuss EMG and lab results      Deborah King CMA on 4/1/2021 at 8:25 AM

## 2021-04-01 NOTE — LETTER
4/1/2021         RE: Bailey Malhotra  [address] [facility] [address]        Dear Colleague,    Thank you for referring your patient, Bailey Malhotra, to the [facility] NEUROLOGY CLINIC [place]. Please see a copy of my visit note below.    [facility] Neurology  [place]    Bailey Malhotra MRN# [id number]   Age: 66 year old YOB: 1955               Assessment and Plan:   Assessment:   Dysphagia/dysarthria -- unclear etiology        Plan:   Orders Placed This Encounter   Procedures     NEUROLOGY ADULT REFERRAL     While she has not noticed any weakness in the extremities or fasciculations, her symptoms are still concerning.  Swallow evaluation is scheduled for April 5.  While her EMG today is certainly not diagnostic of motor neuron disease, given her symptoms I am still concerned.  I would like to have her see the neurologist at the  for a second opinion in this regard.             Chief Complaint/HPI:     I saw Bailey for neurologic evaluation today here in our [place] office.  This is a 66-year-old woman with about a 6-month history of increasing swallowing difficulty and dysarthria.  Sometimes it is harder to swallow pills.  She is sometimes coughing up liquids.  She did have an MRI of the brain and that was unremarkable.  CT of the soft tissues of the neck showed some fullness toward the base of the tongue on the right, but evaluation by ENT showed no specific lesion.  She has not noticed any double vision.  Symptoms do not seem to get worse as the day goes on.  She has not noticed any weakness in the extremities or balance problems.    I sent her for lab work-up including myasthenia panel, TSH, B12, paraneoplastic panel, these were all negative.            Past Medical History:    has no past medical history on file.          Past Surgical History:    has no past surgical history on file.          Social History:     Social History     Tobacco Use     Smoking status: Current  "Every Day Smoker     Packs/day: 1.00     Years: 40.00     Pack years: 40.00     Types: Cigarettes     Smokeless tobacco: Never Used   Substance Use Topics     Alcohol use: Yes     Comment: 6-12 beers on the weekend              Family History:     Family History   Problem Relation Age of Onset     No Known Problems Mother      No Known Problems Father                 Allergies:   No Known Allergies          Medications:     Current Outpatient Medications:      aspirin (ASA) 81 MG chewable tablet, Take 81 mg by mouth daily, Disp: , Rfl:      calcium carbonate 600 mg-vitamin D 400 units (CALTRATE) 600-400 MG-UNIT per tablet, Take 1 tablet by mouth daily, Disp: , Rfl:      diphenhydrAMINE-acetaminophen (TYLENOL PM)  MG tablet, Take 2 tablets by mouth daily, Disp: , Rfl:      famotidine (PEPCID) 20 MG tablet, , Disp: , Rfl:      LANsoprazole (PREVACID) 30 MG DR capsule, , Disp: , Rfl:      multivitamin, therapeutic with minerals (THERA-VIT-M) TABS tablet, Take 1 tablet by mouth daily, Disp: , Rfl:               Physical Exam:        /74 (BP Location: Left arm, Patient Position: Sitting)   Pulse 99   Ht 1.626 m (5' 4\")   Wt 72.8 kg (160 lb 8 oz)   LMP  (LMP Unknown)   BMI 27.55 kg/m     Awake, alert, no aphasia, no dysarthria  Oriented x3, attention is fine  Cranial nerves II - XII tested and intact, no nystagmus  Possibly mild fasciculations in the tongue  There is no focal or generalized weakness in the extremities  Rapid alternating movements are normal on both sides  Tone is normal on both sides  Sensation is normal  Gait is normal   Again, reflexes are brisk throughout          Sonido Barriga MD            Again, thank you for allowing me to participate in the care of your patient.        Sincerely,        Sonido Barriga MD    "

## 2021-04-01 NOTE — PROCEDURES
[facility]    Formerly [facility]  [address] [place] [address], [address]  [place] [address]  Tel: [phone]  Fax: [phone]          Full Name: Bailey Malhotra Gender: Female  Patient ID: [id number] YOB: 1955      Visit Date: 4/1/2021 07:42  Age: 66 Years 2 Months Old  Interpreted By: Sonido Barriga MD   Ref Dr.: Daniel Fortune MD  Tech:    Height: 5 feet 4 inch  Reason for referral: Evaluate right upper/right lower. c/o speech difficulty > 6 months. No other complaint at this time.       Motor NCS      Nerve / Sites Lat Amp Dist Shoaib    ms mV cm m/s   R Median - APB      Wrist 3.96 8.8 7       Elbow 8.13 8.2 23 55   R Ulnar - ADM      Wrist 2.50 11.8 7       B.Elbow 5.99 11.5 20.5 59      A.Elbow 7.81 11.1 10 55   R Peroneal - EDB      Ankle 3.54 7.9 8       Fib head 9.84 7.5 29 46      Pop fossa 12.03 7.4 10 46   R Tibial - AH      Ankle 4.84 8.9 8       Pop fossa 13.18 7.6 36 43       F  Wave      Nerve Fmin    ms   R Ulnar - ADM 26.98   R Tibial - AH 52.45       Sensory NCS      Nerve / Sites Onset Lat Pk Lat PP Amp Dist    ms ms  V cm   R Median - II (Antidr)      Wrist 2.29 3.18 55.3 13   R Ulnar - V (Antidr)      Wrist 1.82 2.71 33.0 11   R Median, Ulnar - Transcarpal comparison      Median Palm 1.46 2.08 47.5 8      Ulnar Palm 1.25 1.77 28.1 8   R Sural - Ankle (Calf)      Calf 3.13 4.01 11.0 14   R Superficial peroneal - Ankle      Lat leg 2.45 3.28 18.0 12       EMG Summary Table     Spontaneous MUAP Rcmt Note   Muscle Fib PSW Fasc IA # Amp Dur PPP Rate Type   R. Gluteus medius None None None N N N N N N N   R. Gluteus brooklyn None None None N N N N N N N   R. L3 paraspinal None None None N N N N N N N   R. L4 paraspinal None None None N N N N N N N   R. L5 paraspinal None None None N N N N N N N   R. S1 paraspinal None None None N N N N N N N   R. Adductor rafia None None None N N N N N N N   R. Quadriceps None None None N N N N N N  N   R. Tibialis anterior None None None N N N N N N N   R. Gastrocnemius (Medial head) None None None N N N N N N N   R. Brachioradialis None None None N N N N N N N   R. Pronator teres None None None N N N N N N N   R. Biceps brachii None None None N N N N N N N   R. Deltoid None None None N N N N N N N   R. Triceps brachii None None None N N N N N N N   R. Flexor carpi ulnaris None None None N N N N N N N   R. First dorsal interosseous None None None N N N N N N N   R. Abductor pollicis brevis None None None N N N N N N N   Tongue None None None N N N N N N N       History:  This patient is a 66-year-old woman with swallowing difficulty and hoarse voice increasing for several months.  She was evaluated by ENT, no clear etiology was found.  This EMG is performed to evaluate for motor neuron disease.    Findings:  Motor nerve conduction studies of the right median, ulnar, peroneal and tibial nerves are well within normal limits.    F-wave latencies in the right ulnar and right tibial nerves are normal.    Sensory studies of right median, right ulnar, right sural and right superficial peroneal nerves are normal as well.    Needle EMG of selected muscles throughout the right arm and leg including lumbar paraspinal muscles showed no abnormal spontaneous activity and motor units of normal configuration and recruitment.  Right side of the tongue was sampled, there were motor units apparent, fibrillations and positive sharp waves hard to rule out entirely.    Conclusion:  This is a normal EMG of the right arm and leg.  EMG of the tongue was equivocal.

## 2021-04-01 NOTE — PROGRESS NOTES
[facility] Neurology  [place]    Bailey Malhotra MRN# [id number]   Age: 66 year old YOB: 1955               Assessment and Plan:   Assessment:   Dysphagia/dysarthria -- unclear etiology        Plan:   Orders Placed This Encounter   Procedures     NEUROLOGY ADULT REFERRAL     While she has not noticed any weakness in the extremities or fasciculations, her symptoms are still concerning.  Swallow evaluation is scheduled for April 5.  While her EMG today is certainly not diagnostic of motor neuron disease, given her symptoms I am still concerned.  I would like to have her see the neurologist at the  for a second opinion in this regard.             Chief Complaint/HPI:     I saw Bailey for neurologic evaluation today here in our [place] office.  This is a 66-year-old woman with about a 6-month history of increasing swallowing difficulty and dysarthria.  Sometimes it is harder to swallow pills.  She is sometimes coughing up liquids.  She did have an MRI of the brain and that was unremarkable.  CT of the soft tissues of the neck showed some fullness toward the base of the tongue on the right, but evaluation by ENT showed no specific lesion.  She has not noticed any double vision.  Symptoms do not seem to get worse as the day goes on.  She has not noticed any weakness in the extremities or balance problems.    I sent her for lab work-up including myasthenia panel, TSH, B12, paraneoplastic panel, these were all negative.            Past Medical History:    has no past medical history on file.          Past Surgical History:    has no past surgical history on file.          Social History:     Social History     Tobacco Use     Smoking status: Current Every Day Smoker     Packs/day: 1.00     Years: 40.00     Pack years: 40.00     Types: Cigarettes     Smokeless tobacco: Never Used   Substance Use Topics     Alcohol use: Yes     Comment: 6-12 beers on the weekend              Family History:  "    Family History   Problem Relation Age of Onset     No Known Problems Mother      No Known Problems Father                 Allergies:   No Known Allergies          Medications:     Current Outpatient Medications:      aspirin (ASA) 81 MG chewable tablet, Take 81 mg by mouth daily, Disp: , Rfl:      calcium carbonate 600 mg-vitamin D 400 units (CALTRATE) 600-400 MG-UNIT per tablet, Take 1 tablet by mouth daily, Disp: , Rfl:      diphenhydrAMINE-acetaminophen (TYLENOL PM)  MG tablet, Take 2 tablets by mouth daily, Disp: , Rfl:      famotidine (PEPCID) 20 MG tablet, , Disp: , Rfl:      LANsoprazole (PREVACID) 30 MG DR capsule, , Disp: , Rfl:      multivitamin, therapeutic with minerals (THERA-VIT-M) TABS tablet, Take 1 tablet by mouth daily, Disp: , Rfl:               Physical Exam:        /74 (BP Location: Left arm, Patient Position: Sitting)   Pulse 99   Ht 1.626 m (5' 4\")   Wt 72.8 kg (160 lb 8 oz)   LMP  (LMP Unknown)   BMI 27.55 kg/m     Awake, alert, no aphasia, no dysarthria  Oriented x3, attention is fine  Cranial nerves II - XII tested and intact, no nystagmus  Possibly mild fasciculations in the tongue  There is no focal or generalized weakness in the extremities  Rapid alternating movements are normal on both sides  Tone is normal on both sides  Sensation is normal  Gait is normal   Again, reflexes are brisk throughout          Sonido Barriga MD        "

## 2021-04-01 NOTE — NURSING NOTE
Component      Latest Ref Rng & Units 3/1/2021 3/1/2021          11:35 AM  3:00 PM   INTERPRETIVE COMMENTS       SEE BELOW    KELSIE-1      <1:240 titer Negative    Reflex Added       None.    KELSIE-2      <1:240 titer Negative    KELSIE-3      <1:240 titer Negative    AGNA-1      <1:240 titer Negative    PCA-1      <1:240 titer Negative    PCA-2      <1:240 titer Negative    PCA-TR      <1:240 titer Negative    Amphiphysin Ab, S      <1:240 titer Negative    CRMP-5-IgG      <1:240 titer Negative    Striational (Striated Muscle) Antibody      <1:120 titer Negative Negative   P/Q-Type Calcium Channel Antibody      <=0.02 nmol/L 0.00    N-Type Calcium Channel Antibody      <=0.03 nmol/L 0.00    ACHR Ganglionic Neuronal Antibody      <=0.02 nmol/L 0.00    Neuronal (V-G) K+ Channel Antibody      <=0.02 nmol/L 0.00    Miscellanous Test Dept.           Test Name           Performing Lab           Scan Result           Calcium, Ionized Measured      1.11 - 1.30 mmol/L 1.20    Calcium, Ionized pH 7.4      1.11 - 1.30 mmol/L 1.16    pH      7.35 - 7.45 7.34 (L)    Vitamin B-12      213 - 816 pg/mL 604    Copper, Serum/Plasma      80.0 - 155.0 ug/dL 113.0    CK, Total      30 - 190 U/L 69      Component      Latest Ref Rng & Units 3/2/2021             INTERPRETIVE COMMENTS          KELSIE-1      <1:240 titer    Reflex Added          KELSIE-2      <1:240 titer    KELSIE-3      <1:240 titer    AGNA-1      <1:240 titer    PCA-1      <1:240 titer    PCA-2      <1:240 titer    PCA-TR      <1:240 titer    Amphiphysin Ab, S      <1:240 titer    CRMP-5-IgG      <1:240 titer    Striational (Striated Muscle) Antibody      <1:120 titer    P/Q-Type Calcium Channel Antibody      <=0.02 nmol/L    N-Type Calcium Channel Antibody      <=0.03 nmol/L    ACHR Ganglionic Neuronal Antibody      <=0.02 nmol/L    Neuronal (V-G) K+ Channel Antibody      <=0.02 nmol/L    Miscellanous Test Dept.       Chemistry Reference Test   Test Name       Myasthenia Gravis  Reflexive Panel, serum   Performing Lab       [facility] i2we . . .   Scan Result       See Scanned Report   Calcium, Ionized Measured      1.11 - 1.30 mmol/L    Calcium, Ionized pH 7.4      1.11 - 1.30 mmol/L    pH      7.35 - 7.45    Vitamin B-12      213 - 816 pg/mL    Copper, Serum/Plasma      80.0 - 155.0 ug/dL    CK, Total      30 - 190 U/L

## 2021-04-01 NOTE — LETTER
4/1/2021         RE: Bailey Malhotra  [address] [facility] [address]        Dear Colleague,    Thank you for referring your patient, Bailey Malhotra, to the [facility] NEUROLOGY CLINIC [place]. Please see a copy of my visit note below.    Epic requires a note here      Again, thank you for allowing me to participate in the care of your patient.        Sincerely,        Sonido Barriga MD

## 2021-04-05 ENCOUNTER — HOSPITAL ENCOUNTER (OUTPATIENT)
Dept: RADIOLOGY | Facility: CLINIC | Age: 66
Discharge: HOME | End: 2021-04-05
Attending: OTOLARYNGOLOGY

## 2021-04-05 DIAGNOSIS — R13.10 DYSPHAGIA, UNSPECIFIED TYPE: ICD-10-CM

## 2021-04-05 DIAGNOSIS — G12.22 BULBAR PALSY (H): ICD-10-CM

## 2021-04-05 DIAGNOSIS — G12.20 MND (MOTOR NEURONE DISEASE) (H): Primary | ICD-10-CM

## 2021-04-07 ENCOUNTER — OFFICE VISIT (OUTPATIENT)
Dept: NEUROLOGY | Facility: CLINIC | Age: 66
End: 2021-04-07
Payer: COMMERCIAL

## 2021-04-07 ENCOUNTER — RECORDS - HEALTHEAST (OUTPATIENT)
Dept: ADMINISTRATIVE | Facility: OTHER | Age: 66
End: 2021-04-07

## 2021-04-07 ENCOUNTER — OFFICE VISIT - HEALTHEAST (OUTPATIENT)
Dept: FAMILY MEDICINE | Facility: CLINIC | Age: 66
End: 2021-04-07

## 2021-04-07 VITALS
SYSTOLIC BLOOD PRESSURE: 126 MMHG | WEIGHT: 159 LBS | BODY MASS INDEX: 27.29 KG/M2 | OXYGEN SATURATION: 99 % | HEART RATE: 89 BPM | DIASTOLIC BLOOD PRESSURE: 85 MMHG

## 2021-04-07 DIAGNOSIS — D75.89 MACROCYTOSIS: ICD-10-CM

## 2021-04-07 DIAGNOSIS — R79.89 ABNORMAL LFTS: ICD-10-CM

## 2021-04-07 DIAGNOSIS — G12.20 MND (MOTOR NEURONE DISEASE) (H): ICD-10-CM

## 2021-04-07 DIAGNOSIS — G12.22 BULBAR PALSY (H): Primary | ICD-10-CM

## 2021-04-07 DIAGNOSIS — F48.2 PBA (PSEUDOBULBAR AFFECT): ICD-10-CM

## 2021-04-07 DIAGNOSIS — K29.80 DUODENITIS: ICD-10-CM

## 2021-04-07 DIAGNOSIS — G12.22 PROGRESSIVE BULBAR PALSY (H): ICD-10-CM

## 2021-04-07 LAB
ALBUMIN SERPL-MCNC: 4 G/DL (ref 3.5–5)
ALP SERPL-CCNC: 130 U/L (ref 45–120)
ALT SERPL W P-5'-P-CCNC: 23 U/L (ref 0–45)
ANION GAP SERPL CALCULATED.3IONS-SCNC: 12 MMOL/L (ref 5–18)
AST SERPL W P-5'-P-CCNC: 18 U/L (ref 0–40)
BILIRUB SERPL-MCNC: 0.2 MG/DL (ref 0–1)
BUN SERPL-MCNC: 8 MG/DL (ref 8–22)
CALCIUM SERPL-MCNC: 9.6 MG/DL (ref 8.5–10.5)
CHLORIDE BLD-SCNC: 104 MMOL/L (ref 98–107)
CO2 SERPL-SCNC: 26 MMOL/L (ref 22–31)
CREAT SERPL-MCNC: 0.77 MG/DL (ref 0.6–1.1)
ERYTHROCYTE [DISTWIDTH] IN BLOOD BY AUTOMATED COUNT: 12.2 % (ref 11–14.5)
FOLATE SERPL-MCNC: 19.6 NG/ML
GFR SERPL CREATININE-BSD FRML MDRD: >60 ML/MIN/1.73M2
GLUCOSE BLD-MCNC: 92 MG/DL (ref 70–125)
HCT VFR BLD AUTO: 40.3 % (ref 35–47)
HGB BLD-MCNC: 13.1 G/DL (ref 12–16)
MCH RBC QN AUTO: 31.8 PG (ref 27–34)
MCHC RBC AUTO-ENTMCNC: 32.5 G/DL (ref 32–36)
MCV RBC AUTO: 98 FL (ref 80–100)
PLATELET # BLD AUTO: 342 THOU/UL (ref 140–440)
PMV BLD AUTO: 10.1 FL (ref 7–10)
POTASSIUM BLD-SCNC: 4.9 MMOL/L (ref 3.5–5)
PROT SERPL-MCNC: 7.5 G/DL (ref 6–8)
RBC # BLD AUTO: 4.12 MILL/UL (ref 3.8–5.4)
SODIUM SERPL-SCNC: 142 MMOL/L (ref 136–145)
WBC: 7.6 THOU/UL (ref 4–11)

## 2021-04-07 PROCEDURE — 99417 PROLNG OP E/M EACH 15 MIN: CPT | Performed by: PSYCHIATRY & NEUROLOGY

## 2021-04-07 PROCEDURE — 94375 RESPIRATORY FLOW VOLUME LOOP: CPT | Performed by: INTERNAL MEDICINE

## 2021-04-07 PROCEDURE — 99215 OFFICE O/P EST HI 40 MIN: CPT | Performed by: PSYCHIATRY & NEUROLOGY

## 2021-04-07 ASSESSMENT — REVISED AMYOTROPHIC LATERAL SCLEROSIS FUNCTIONAL RATING SCALE (ALSFRS-R)
BULBAR_SUBTOTAL: 8
DYSPNEA: 4
ALSFRS_TOTAL_SCORE: 44
CLIMBING_STAIRS: NORMAL
SPEECH: INTELLIGIBLE WITH REPEATING
RESPIRATORY_INSUFFICIENCY: 4
DRESSING_AND_HYGEINE: NORMAL FUNCTION
ORTHOPENA: 4
SPEECH: 2
CLIMBING_STAIRS: 4
HANDWRITING: NORMAL
WALKING: NORMAL
SALIVATION: 3
WALKING: 4
FINE_MOTOR_SUBTOTAL_SCORE: 12
SWALLOWING: EARLY EATING PROBLEMS - OCCASIONAL CHOKING
TURNING_IN_BED_AND_ADJUSTING_BED_CLOTHES: 4
SIX_ITEM_SUBTOTAL: 22
SALIVATION: SLIGHT BUT DEFINITE EXCESS OF SALIVA IN MOUTH; MAY HAVE NIGHTTIME DROOLING
TURNING_IN_BED_AND_ADJUSTING_BED_CLOTHES: NORMAL
CUTTING_FOOD_AND_HANDLING_UTENSILES: 4
DRESSING_AND_HYGEINE: 4
SWALLOWING: 3
HANDWRITING: 4
GROSS_MOTOR_SUBTOTAL_SCORE: 12
RESPIRATORY_SUBTOTAL_SCORE: 12

## 2021-04-07 NOTE — LETTER
2021       RE: Bailey Malhotra  [address] [facility] [address]     Dear Colleague,    Thank you for referring your patient, Bailey Malhotra, to the [facility] [place] at [facility]. Please see a copy of my visit note below.    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        Ms. Malhotra is a 66-year-old woman who noted insidious onset of abnormal speech 6 months ago, which has progressed since that time.  Early in the process, she had some difficulty with dysphagia for liquids, which she feels has since improved.  This is her only neurologic complaint.  Specifically, she denies cognitive or behavioral dysfunction, blurred vision, double vision, ptosis, gait difficulty, fine or gross motor abnormalities, positive or negative sensory symptoms or pain.  She has noted some excess tearfulness when watching movies.  She has lost about 5 pounds recently, but attributes this to a recently diagnosed gastric ulcer for which she has been placed on Protonix.  She does not explicitly report loss of appetite.  She denies dyspnea on exertion, orthopnea or difficulty sleeping. She denies fasciculations or cramps. She has noted very modest increase in saliva, particularly early in the course of these symptoms and less so recently.     Her past medical history is relatively unremarkable.        Ms. Malhotra has 3 children and had 1 brother.  Her brother experienced a stroke.  There is no family history of neurodegenerative or neuromuscular disease, however.        She lives with one of her  sons and his 2 children.  She smokes.  She previously drank an occasional beer on weekends, but has not been drinking alcohol recently.      PHYSICAL EXAMINATION:  Vital signs are normal.  Examination of the extremities demonstrates subtle mottling in the lower limbs which may be physiologic.  There is slight discoloration of the tips of the toes, but a strong pedal pulse.  Skin is intact.  There are no musculoskeletal deformities.  She does endorse arthritis of the carpometacarpal joints in the right upper arm, but has full range of motion without pain at the right shoulder.  There is no scapular winging with elevation or abduction of the upper limbs.  Neck is supple with full range of motion.      NEUROLOGIC EXAMINATION:  The patient is alert and appropriate.  She has a moderate spastic dysarthria, which is 90% comprehensible.  Labial and lingual sounds are performed quite slowly.  She is fully oriented.  She has a 6-digit attention span.  She recalls 2/3 words after distraction.  There is no language dysfunction.  Extraocular movements are full.  In particular, volitional vertical gaze appears normal.  There is no ptosis.  Pupils are equal, round and reactive to light.  Orbicularis oculi strength is normal.  Orbicularis oris strength is impaired to a moderately severe degree, however.  Pterygoid strength is normal.  Tongue bulk is normal.  There are subtle, but probably true fasciculations on the lateral aspects of the tongue.  Tongue movements are slowed to a moderately severe degree and tongue strength is impaired to a moderately severe degree in both directions.  She has a brisk masseter reflex.  Sensory examination reveals 5-7 seconds of vibration perception at the malleoli and 0 at the toes.  Touch and pin perception are normal throughout, including the face.  Motor examination demonstrates a subtle cogwheeling at the right wrist only without definite spasticity or notable rigidity.  There is mild atrophy of  the right thenar eminence, but no definite atrophy of the right FDI.  There is no definite atrophy nor fasciculations noted elsewhere.  Manual muscle testing demonstrates full strength except for 4+ elbow and digit extension on the right and 4/5 FDI and APB on the right.  Rapid repetitive and rapid alternating movements are normal throughout except for slight relative slowing of foot tapping on the left.  Reflexes are 3+ in bilateral upper limbs with unequivocal spread and bilateral Lenin signs.  They are brisk at the knees without clear adductor reflexes.  They are 2+ at the Achilles.  Plantar responses are mute.  Coordination is normal in upper and lower limbs.  Gait is normal.  Arm swing is normal.  She is neither stooped nor hyperextended at the trunk.  She turns well.  She is able to walk on heels and on toes.  Romberg sign is not present.      MEDICAL RECORD REVIEW:  Brain MRI report indicates no abnormalities.  Extensive electromyography and nerve conduction studies are normal.  A myasthenia gravis panel was reportedly normal, although I do not have the values.  One CBC demonstrated a mildly elevated MCV with a normal hemoglobin, but her most recent MCVs are in the high normal range.  Vitamin B12 level is over 500.  ALT, AST and alkaline phosphatase were elevated on one draw, but CK is normal.  Paraneoplastic antibody panel is negative.        I explained the following to Ms. 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  I explained that this is a form of motor neuron disease that characteristically progresses after a period of time to ALS, and I am concerned about that possibility in part because of mild weakness in the right hand. I recognize that her EMG is normal and that her weakness is in an upper motor neuron pattern, raising consideration of primary lateral sclerosis.  That said, however,  the degree of tongue and facial weakness is notable and there is no lower extremity spasticity.  I think it more likely that this is an early presentation of ALS in which lower motor neuron findings will become more evident over time rather than PLS.  There is no clinical evidence of an extrapyramidal disorder other than trivial cogwheeling at the right wrist.      We discussed the management and prognosis in detail.  She does understand the prognosis of ALS and management options in end-stage ALS.  Currently, the most critical issues are management of communication, swallowing and nutrition.  We will bring her back for a visit with our multidisciplinary clinic, where she will meet with our speech therapist and, if indicated,  or occupational therapist.  If symptoms do not change appreciably over time, I would consider repeating an EMG at a later date, but would not do so if the clinical course becomes characteristic of ALS.  Insofar as she has clinical upper and lower motor neuron signs in 2 regions, I recommended that we start riluzole if her hepatic function tests normalize and offered a benefits investigation for Radicava.  I recommended Nuedexta if her EKG shows no concerning abnormalities as she has some pseudobulbar affect and it may have some benefit for speech intelligibility.  She will undergo baseline pulmonary function tests today, with the caveat that they can be artifactually abnormal because of the lower facial weakness.        I will speak with her primary care provider about management as well by phone as she has an appointment today with him.      Sincerely,      Phi Perkins MD         120 minutes spent on the date of the encounter on chart review, history and examination, documentation and further activities as noted above.      D: 2021   T: 2021   MT: ERIC      Name:     PAYAL JOHNSON   MRN:      [id number]        Account:      [id number]   :      1955            Service Date: 04/07/2021      Document: [id number]

## 2021-04-07 NOTE — PATIENT INSTRUCTIONS
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 Unfortunately we do not yet have a cure and this does progress, usually with weakness in the hands and limbs developing at a later date, which we call ALS. I do not expect any rapid changes. We discussed the treatments and management today.    Recommendations:    1. Nuedexta if your EKG is OK.  2. Riluzole if your liver function tests are OK. The last set I saw were mildly abnormal.  3. We can begin the insurance approval process for the intravenous medication (Radicava).  4. Our dietician will call you.  5. Return visit to our clinic on a day when we have the entire team here. You may only need to meet with our speech therapist and if you'd like with our .    Please call Lupe @ [phone] for questions or concerns during regular business hours. For a more efficient way to communicate, use Strolby and address the message to your physician. Remember, Appriont is only read during business hours. Do not leave urgent messages on voicemail or Strolby. If situation is urgent, contact the Neurology Clinic @ [phone] and ask to speak to a Triage Nurse or Call 911 or visit an Emergency Department.     Please call your pharmacy if you need a medication refill. They will send us an electronic message.

## 2021-04-07 NOTE — PROGRESS NOTES
Service Date: 2021      Sonido Barriga MD   [facility]   [address]   [place] [address]      Daniel Fortune MD   [address]   [place] [address]       RE:    Bailey Malhotra   MRN:  [id number]   :  1955      Dear Doctors:      I saw Bailey Malhotra in neuromuscular consultation today for further evaluation of possible progressive bulbar palsy.        Ms. Malhotra is a 66-year-old woman who noted insidious onset of abnormal speech 6 months ago, which has progressed since that time.  Early in the process, she had some difficulty with dysphagia for liquids, which she feels has since improved.  This is her only neurologic complaint.  Specifically, she denies cognitive or behavioral dysfunction, blurred vision, double vision, ptosis, gait difficulty, fine or gross motor abnormalities, positive or negative sensory symptoms or pain.  She has noted some excess tearfulness when watching movies.  She has lost about 5 pounds recently, but attributes this to a recently diagnosed gastric ulcer for which she has been placed on Protonix.  She does not explicitly report loss of appetite.  She denies dyspnea on exertion, orthopnea or difficulty sleeping. She denies fasciculations or cramps. She has noted very modest increase in saliva, particularly early in the course of these symptoms and less so recently.     Her past medical history is relatively unremarkable.        Ms. Malhotra has 3 children and had 1 brother.  Her brother experienced a stroke.  There is no family history of neurodegenerative or neuromuscular disease, however.        She lives with one of her sons and his 2 children.  She smokes.  She previously drank an occasional beer on weekends, but has not been drinking alcohol recently.      PHYSICAL EXAMINATION:  Vital signs are normal.  Examination of the extremities demonstrates subtle mottling in the lower limbs which may be physiologic.  There is  slight discoloration of the tips of the toes, but a strong pedal pulse.  Skin is intact.  There are no musculoskeletal deformities.  She does endorse arthritis of the carpometacarpal joints in the right upper arm, but has full range of motion without pain at the right shoulder.  There is no scapular winging with elevation or abduction of the upper limbs.  Neck is supple with full range of motion.      NEUROLOGIC EXAMINATION:  The patient is alert and appropriate.  She has a moderate spastic dysarthria, which is 90% comprehensible.  Labial and lingual sounds are performed quite slowly.  She is fully oriented.  She has a 6-digit attention span.  She recalls 2/3 words after distraction.  There is no language dysfunction.  Extraocular movements are full.  In particular, volitional vertical gaze appears normal.  There is no ptosis.  Pupils are equal, round and reactive to light.  Orbicularis oculi strength is normal.  Orbicularis oris strength is impaired to a moderately severe degree, however.  Pterygoid strength is normal.  Tongue bulk is normal.  There are subtle, but probably true fasciculations on the lateral aspects of the tongue.  Tongue movements are slowed to a moderately severe degree and tongue strength is impaired to a moderately severe degree in both directions.  She has a brisk masseter reflex.  Sensory examination reveals 5-7 seconds of vibration perception at the malleoli and 0 at the toes.  Touch and pin perception are normal throughout, including the face.  Motor examination demonstrates a subtle cogwheeling at the right wrist only without definite spasticity or notable rigidity.  There is mild atrophy of the right thenar eminence, but no definite atrophy of the right FDI.  There is no definite atrophy nor fasciculations noted elsewhere.  Manual muscle testing demonstrates full strength except for 4+ elbow and digit extension on the right and 4/5 FDI and APB on the right.  Rapid repetitive and rapid  alternating movements are normal throughout except for slight relative slowing of foot tapping on the left.  Reflexes are 3+ in bilateral upper limbs with unequivocal spread and bilateral Lenin signs.  They are brisk at the knees without clear adductor reflexes.  They are 2+ at the Achilles.  Plantar responses are mute.  Coordination is normal in upper and lower limbs.  Gait is normal.  Arm swing is normal.  She is neither stooped nor hyperextended at the trunk.  She turns well.  She is able to walk on heels and on toes.  Romberg sign is not present.      MEDICAL RECORD REVIEW:  Brain MRI report indicates no abnormalities.  Extensive electromyography and nerve conduction studies are normal.  A myasthenia gravis panel was reportedly normal, although I do not have the values.  One CBC demonstrated a mildly elevated MCV with a normal hemoglobin, but her most recent MCVs are in the high normal range.  Vitamin B12 level is over 500.  ALT, AST and alkaline phosphatase were elevated on one draw, but CK is normal.  Paraneoplastic antibody panel is negative.        I explained the following to Ms. 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  I explained that this is a form of motor neuron disease that characteristically progresses after a period of time to ALS, and I am concerned about that possibility in part because of mild weakness in the right hand. I recognize that her EMG is normal and that her weakness is in an upper motor neuron pattern, raising consideration of primary lateral sclerosis.  That said, however, the degree of tongue and facial weakness is notable and there is no lower extremity spasticity.  I think it more likely that this is an early presentation of ALS in which lower motor neuron findings will become more evident over time rather than PLS.  There is no clinical evidence of an extrapyramidal  disorder other than trivial cogwheeling at the right wrist.      We discussed the management and prognosis in detail.  She does understand the prognosis of ALS and management options in end-stage ALS.  Currently, the most critical issues are management of communication, swallowing and nutrition.  We will bring her back for a visit with our multidisciplinary clinic, where she will meet with our speech therapist and, if indicated,  or occupational therapist.  If symptoms do not change appreciably over time, I would consider repeating an EMG at a later date, but would not do so if the clinical course becomes characteristic of ALS.  Insofar as she has clinical upper and lower motor neuron signs in 2 regions, I recommended that we start riluzole if her hepatic function tests normalize and offered a benefits investigation for Radicava.  I recommended Nuedexta if her EKG shows no concerning abnormalities as she has some pseudobulbar affect and it may have some benefit for speech intelligibility.  She will undergo baseline pulmonary function tests today, with the caveat that they can be artifactually abnormal because of the lower facial weakness.        I will speak with her primary care provider about management as well by phone as she has an appointment today with him.      Sincerely,         MD JAMI Jones MD       120 minutes spent on the date of the encounter on chart review, history and examination, documentation and further activities as noted above.      D: 2021   T: 2021   MT: ERIC      Name:     PAYAL JOHNSON   MRN:      [id number]        Account:      [id number]   :      1955           Service Date: 2021      Document: [id number]

## 2021-04-09 LAB
EXPTIME-PRE: 7.19 SEC
FEF2575-%PRED-PRE: 40 %
FEF2575-PRE: 0.84 L/SEC
FEF2575-PRED: 2.09 L/SEC
FEFMAX-%PRED-PRE: 29 %
FEFMAX-PRE: 1.81 L/SEC
FEFMAX-PRED: 6.07 L/SEC
FEV1-%PRED-PRE: 50 %
FEV1-PRE: 1.2 L
FEV1FEV6-PRE: 58 %
FEV1FEV6-PRED: 80 %
FEV1FVC-PRE: 56 %
FEV1FVC-PRED: 79 %
FIFMAX-PRE: 1.91 L/SEC
FVC-%PRED-PRE: 69 %
FVC-PRE: 2.14 L
FVC-PRED: 3.06 L
INTERPRETATION ECG - MUSE: NORMAL
MEP-PRE: 36 CMH2O
MIP-PRE: -35 CMH2O

## 2021-04-13 ENCOUNTER — COMMUNICATION - HEALTHEAST (OUTPATIENT)
Dept: FAMILY MEDICINE | Facility: CLINIC | Age: 66
End: 2021-04-13

## 2021-04-19 ENCOUNTER — AMBULATORY - HEALTHEAST (OUTPATIENT)
Dept: NURSING | Facility: CLINIC | Age: 66
End: 2021-04-19

## 2021-04-19 DIAGNOSIS — G12.21 ALS (AMYOTROPHIC LATERAL SCLEROSIS) (H): Primary | ICD-10-CM

## 2021-04-20 ENCOUNTER — TELEPHONE (OUTPATIENT)
Dept: NEUROLOGY | Facility: CLINIC | Age: 66
End: 2021-04-20

## 2021-04-20 NOTE — TELEPHONE ENCOUNTER
"CLINICAL NUTRITION SERVICES - ASSESSMENT NOTE    Bailey Malhotra 66 year old referred for MNT related to ALS    Visit Type: phone (NO CHARGE)  Referring Physician: Maddie  Pt accompanied by: self    NUTRITION HISTORY  Factors affecting nutrition intake include:swallowing difficulties  Current diet: soft foods  Current appetite/intake: fair  PEG Tube: No    Bailey tells me that she has been able to eat most food types and textures.  -She cuts her foods up into small pieces and takes her time with eating/chewing.    -She has been avoiding acidic and spicy foods due to a stomach ulcer.  Other than that, she doesn't list any other foods she avoids due to difficulty swallowing.   -She has been eating a lot of jello, pudding, yogurt and cottage cheese as these foods are soothing to her ulcer and take less effort to eat.    -She eats a lot of meals with chicken.  -She has not tried nutrition shakes such as Boost or Ensure but is interested in them.     ANTHROPOMETRICS  Height: 64\"  Weight: 159 lbs/72kg  BMI: 27  Weight Status:  Overweight BMI 25-29.9  IBW: 120 lbs  % IBW: 132%  Weight History: per patient report, down ~5 lbs - timeframe unknown.  Wt Readings from Last 6 Encounters:   04/07/21 72.1 kg (159 lb)   04/01/21 72.8 kg (160 lb 8 oz)   03/01/21 72.6 kg (160 lb)     Dosing Weight: 72kg (actual wt)    ASSESSED NUTRITION NEEDS:  Estimated Energy Needs: 1800 kcals (25 Kcal/Kg)  Justification: maintenance  Estimated Protein Needs: 72 grams protein (1 g pro/Kg)  Justification: maintenance    NUTRITION DIAGNOSIS:  Inadequate oral intake related to dysphagia as evidenced by pt reports difficulty swallowing, wt loss    INTERVENTIONS  Recommendations / Nutrition Prescription  1. ONS - 1-2/day  2. 1800-2000kcal, 70g protein/day  Nutrition Education     Provided written & verbal education:   - Discussed ways to maximize kcal and protein intake. Discussed calorie and protein needs for maintenance of weight and nutrition status.  " Advised pt to aim for at least 1800kcal and 70g protein via 5-6 small frequent meals.  Discussed that as ALS progresses, eating may become more difficult and discussed ways to cope with this.   - Encouraged ONS (Ensure Enlive, Ensure Plus/Boost Plus etc). Suggested ways to incorporate these supplements to avoid flavor fatigue. Encouraged pt to start consuming 2 ONS daily.   She plans to start drinking ONS for additional nutrition.     Pt verbalize understanding of materials provided during consult.   Patient Understanding: Excellent  Expected Compliance: Excellent    Goals  1.  Aim for 5-6 small frequent meals  2.  Aim for 1800kcal and 70g protein/day  3. Weight maintenance      Follow-Up Plans: Pt has RD contact information for questions.    Pt encouraged to follow up with RD at next clinic visit in 3-6 months.    MONITORING AND EVALUATION:  -Food intake  -Fluid/beverage intake  -Liquid meal replacement or supplement  -Weight trends    Izabel Morris, RDN, LDN

## 2021-05-10 ENCOUNTER — AMBULATORY - HEALTHEAST (OUTPATIENT)
Dept: NURSING | Facility: CLINIC | Age: 66
End: 2021-05-10

## 2021-05-17 ENCOUNTER — TELEPHONE (OUTPATIENT)
Dept: NEUROLOGY | Facility: CLINIC | Age: 66
End: 2021-05-17

## 2021-05-17 NOTE — TELEPHONE ENCOUNTER
Spoke to patient to remind her the sertraline was alternative to Nuedexta due to insurance denial. She is not to be taking both.  Pt acknowledged understanding.

## 2021-05-26 ASSESSMENT — PATIENT HEALTH QUESTIONNAIRE - PHQ9
SUM OF ALL RESPONSES TO PHQ QUESTIONS 1-9: 13
SUM OF ALL RESPONSES TO PHQ QUESTIONS 1-9: 19

## 2021-05-27 ENCOUNTER — TELEPHONE (OUTPATIENT)
Dept: NEUROLOGY | Facility: CLINIC | Age: 66
End: 2021-05-27

## 2021-05-27 ASSESSMENT — PATIENT HEALTH QUESTIONNAIRE - PHQ9: SUM OF ALL RESPONSES TO PHQ QUESTIONS 1-9: 9

## 2021-05-28 ASSESSMENT — ANXIETY QUESTIONNAIRES
GAD7 TOTAL SCORE: 14
GAD7 TOTAL SCORE: 19
GAD7 TOTAL SCORE: 17

## 2021-05-31 VITALS — WEIGHT: 179.9 LBS | HEIGHT: 65 IN | BODY MASS INDEX: 29.97 KG/M2

## 2021-05-31 VITALS — BODY MASS INDEX: 30.9 KG/M2 | HEIGHT: 64 IN | WEIGHT: 181 LBS

## 2021-06-04 VITALS
BODY MASS INDEX: 27.74 KG/M2 | HEART RATE: 106 BPM | OXYGEN SATURATION: 98 % | HEIGHT: 64 IN | DIASTOLIC BLOOD PRESSURE: 80 MMHG | SYSTOLIC BLOOD PRESSURE: 138 MMHG | WEIGHT: 162.5 LBS

## 2021-06-04 VITALS
DIASTOLIC BLOOD PRESSURE: 62 MMHG | HEART RATE: 84 BPM | WEIGHT: 150.7 LBS | BODY MASS INDEX: 25.87 KG/M2 | SYSTOLIC BLOOD PRESSURE: 100 MMHG

## 2021-06-05 VITALS
WEIGHT: 161 LBS | OXYGEN SATURATION: 98 % | DIASTOLIC BLOOD PRESSURE: 70 MMHG | HEART RATE: 85 BPM | BODY MASS INDEX: 27.64 KG/M2 | TEMPERATURE: 98.1 F | SYSTOLIC BLOOD PRESSURE: 130 MMHG

## 2021-06-05 VITALS
DIASTOLIC BLOOD PRESSURE: 60 MMHG | WEIGHT: 165 LBS | HEART RATE: 100 BPM | TEMPERATURE: 97.4 F | OXYGEN SATURATION: 98 % | BODY MASS INDEX: 28.32 KG/M2 | SYSTOLIC BLOOD PRESSURE: 140 MMHG

## 2021-06-05 VITALS
SYSTOLIC BLOOD PRESSURE: 120 MMHG | WEIGHT: 159 LBS | TEMPERATURE: 98.4 F | DIASTOLIC BLOOD PRESSURE: 70 MMHG | HEART RATE: 84 BPM | BODY MASS INDEX: 27.29 KG/M2

## 2021-06-06 NOTE — TELEPHONE ENCOUNTER
Patient Returning Call  Reason for call:  Reschedule appt  Information relayed to patient:  Message below   Patient has additional questions:  Yes  If YES, what are your questions/concerns:  Patient said she would like to do the phone appt. Her best contact number would be her home phone.   Okay to leave a detailed message?: Yes

## 2021-06-06 NOTE — TELEPHONE ENCOUNTER
Please ask pt if she would be willing to do a phone visit with Dr. Cabrera on Wednesday at 10:20 instead of coming into the clinic.   Due to the COVID19 outbreak we are trying to reduce the number of pts coming into the clinic for their health and safety

## 2021-06-06 NOTE — PROGRESS NOTES
Assessment:     1. Bronchitis due to tobacco use  CT Chest Without Contrast    Comprehensive Metabolic Panel    HM2(CBC w/o Differential)    Thyroid Cascade    Urinalysis-UC if Indicated   2. Visit for screening mammogram  Mammo Screening Bilateral   3. Screen for colon cancer  Ambulatory referral for Colonoscopy    Comprehensive Metabolic Panel    HM2(CBC w/o Differential)    Thyroid Cascade    Urinalysis-UC if Indicated   4. Bowel habit changes  Electrocardiogram Perform - Clinic    HM2(CBC w/o Differential)    Thyroid [place]   5. Routine general medical examination at a health care facility  Comprehensive Metabolic Panel    HM2(CBC w/o Differential)    Thyroid Cascade    Urinalysis-UC if Indicated       Plan:     1. Visit for screening mammogram    - Mammo Screening Bilateral; Future    2. Screen for colon cancer  Patient is having bowel changes  - Ambulatory referral for Colonoscopy  - Comprehensive Metabolic Panel  - HM2(CBC w/o Differential)  - Thyroid Cascade  - Urinalysis-UC if Indicated    3. Bowel habit changes  Following will be pursued   - Electrocardiogram Perform - Clinic  - HM2(CBC w/o Differential)  - Thyroid Cascade    4. Bronchitis due to tobacco use  Smoking cessation was discussed  - CT Chest Without Contrast; Future  - Comprehensive Metabolic Panel  - HM2(CBC w/o Differential)  - Thyroid Cascade  - Urinalysis-UC if Indicated    5. Routine general medical examination at a health care facility  Work-up as follows  - Comprehensive Metabolic Panel  - HM2(CBC w/o Differential)  - Thyroid Cascade  - Urinalysis-UC if Indicated      Subjective:   Patient has not been seen in primary care for some time and has had no real medical follow-up with regards to screening procedures.  Patient has been busy caring for a chronically ill grandchild and has neglected her health.  She is a smoker and has been since teenage years.  She does complain of a chronic cough and postnasal drip and is being seen here at the  "recommendation of her son because he feels over the last 6 months or so she has been slurring some of her words.  She denies any disturbances of cranial nerves or deglutition problems or hemoptysis or hematemesis does have a chronic smoker's cough.  I do not have any basis to compare the patient's speech but general speech screening today appears normal.  She did well with tongue twisters.  Neurological exam was unremarkable.  Based on her smoking history and the story today of some mild dysarthria I want to do a CT scan which will accomplish a follow-up of a lung nodule on her left side as well as screening for lung carcinoma with the possibility of metastatic disease causing some central nervous system problems.  I did notice an occasional PVC but EKG was unremarkable.  I meant to do a comprehensive profile hemogram and urine and see the patient for follow-up in 1 week after CT scan is available to us.  I placed orders for mammogram and colonoscopy.    Review of Systems: A complete 14 point review of systems was obtained and is negative or as stated in the history of present illness.    Past Medical History:   Diagnosis Date     Cholelithiasis      GERD (gastroesophageal reflux disease)      No family history on file.  Past Surgical History:   Procedure Laterality Date     TUBAL LIGATION       Social History     Tobacco Use     Smoking status: Current Every Day Smoker     Smokeless tobacco: Never Used     Tobacco comment: 10-15 per night/weekend   Substance Use Topics     Alcohol use: Yes     Comment: Occasional     Drug use: No         Objective:   /80 (Patient Site: Left Arm, Patient Position: Sitting, Cuff Size: Adult Regular)   Pulse (!) 106   Ht 5' 4\" (1.626 m)   Wt 162 lb 8 oz (73.7 kg)   SpO2 98%   BMI 27.89 kg/m      General Appearance:  Alert, cooperative, no distress  Head:  Normocephalic, no obvious abnormality  Ears: TM anatomy normal  Eyes:  PERRL, EOM's intact, conjunctiva and corneas " clear  Nose:  Nares symmetrical, septum midline, mucosa pink, no sinus tenderness  Throat:  Lips, tongue, and mucosa are moist, pink, and intact  Neck:  Supple, symmetrical, trachea midline, no adenopathy; thyroid: no enlargement, symmetric,no tenderness/mass/nodules; no carotid bruit, no JVD  Back:  Symmetrical, no curvature, ROM normal, no CVA tenderness  Chest/Breast:  No mass or tenderness  Lungs:  Clear to auscultation bilaterally, respirations unlabored   Heart:  Normal PMI, regular rate & rhythm, S1 and S2 normal, no murmurs, rubs, or gallops  Abdomen:  Soft, non-tender, bowel sounds active all four quadrants, no mass, or organomegaly  Musculoskeletal:  Tone and strength strong and symmetrical, all extremities  Lymphatic:  No adenopathy  Skin/Hair/Nails:  Skin warm, dry, and intact, no rashes  Neurologic:  Alert and oriented x3, no cranial nerve deficits, normal strength and tone, gait steady  Extremities:  No edema.  Derrick's sign negative.    Genitourinary: deferred  Pulses:  Equal bilaterally     I have counseled the patient for tobacco cessation and the follow up will occur  at the next visit.      This note has been dictated using voice recognition software. Any grammatical or context distortions are unintentional and inherent to the the software.

## 2021-06-06 NOTE — PROGRESS NOTES
"Bailey Malhotra is a 65 y.o. female who is being evaluated via a billable telephone visit.      The patient has been notified of following:     \"This telephone visit will be conducted via a call between you and your physician/provider. We have found that certain health care needs can be provided without the need for a physical exam.  This service lets us provide the care you need with a short phone conversation.  If a prescription is necessary we can send it directly to your pharmacy.  If lab work is needed we can place an order for that and you can then stop by our lab to have the test done at a later time.    If during the course of the call the physician/provider feels a telephone visit is not appropriate, you will not be charged for this service.\"     Consent has been obtained for this service by 1 care team member: Yes. See the scanned image in the medical record.    Bailey Malhotra complains of No chief complaint on file.  .    I have reviewed and updated the patient's Past Medical History, Social History, Family History and Medication List.    ALLERGIES  Patient has no known allergies.    Maria C MCKAY  (MA signature)    Additional provider notes:3  Depression and Anxiety Follow-Up    How are you doing with your depression since your last visit: No Change    How are you doing with your anxiety since your last visit: No Change    Are you having other symptoms that might be associated with depression or anxiety:No    Have you had a significant life event: Job Concerns     Do you have any concerns with your use of alcohol or other drugs:No    Social History     Tobacco Use     Smoking status: Current Every Day Smoker     Smokeless tobacco: Never Used     Tobacco comment: 10-15 per night/weekend   Substance Use Topics     Alcohol use: Yes     Comment: Occasional     Drug use: No           PHQ-9  English  PHQ-9   Any Language  FARHAD-7  Suicide Assessment Five-step Evaluation and Treatment (SAFE-T)    Assessment/Plan: To go " over her test results following a comprehensive visit 10 days ago; comprehensive profile metabolic profile hemogram thyroid profile urine were all negative.  Patient was referred because of her heavy tobacco history for baseline CT screening which revealed early to moderate COPD/emphysema.  Patient is being evaluated and counseled today with regards to risks and dangers of tobacco abuse and resultant early COPD.  Patient is also having some anxiety depression issues at home and job stress which are causing her to smoke more.  We have advised the patient that there are multiple modalities both psychologic and pharmaceutical that can help her with her anxiety/depression and her nicotine problems.  Patient has agreed to go on a course of Wellbutrin.  Which we will initiate today.  Patient was advised to google the medication that we are planning to give her and she is agreeable to begin therapy to cut back on her tobacco abuse at least 50%.  She was reassured otherwise we could not find any other ongoing pathology.  Plan on seeing the patient within the next 6 months however I feel that a telephone consultation in the next 2 to 3 months would be also indicated and patient was agreeable to this.  I have done a chart review medication review on the patient and there are no contraindications to her new prescription.  The patient was scheduled for a follow-up examination plan: Follow up in  month(s) for 3 months  I have reviewed the note as documented above.  This accurately captures the substance of my conversation with the patient.    Total time of call between patient and provider was 14 minutes 10 seconds    Maria C [facility]

## 2021-06-07 DIAGNOSIS — G12.21 ALS (AMYOTROPHIC LATERAL SCLEROSIS) (H): Primary | ICD-10-CM

## 2021-06-07 RX ORDER — HEPARIN SODIUM,PORCINE 10 UNIT/ML
5 VIAL (ML) INTRAVENOUS
Status: CANCELLED | OUTPATIENT
Start: 2021-06-21

## 2021-06-07 RX ORDER — ALBUTEROL SULFATE 90 UG/1
1-2 AEROSOL, METERED RESPIRATORY (INHALATION)
Status: CANCELLED
Start: 2021-06-21

## 2021-06-07 RX ORDER — DIPHENHYDRAMINE HYDROCHLORIDE 50 MG/ML
50 INJECTION INTRAMUSCULAR; INTRAVENOUS
Status: CANCELLED
Start: 2021-06-21

## 2021-06-07 RX ORDER — NALOXONE HYDROCHLORIDE 0.4 MG/ML
0.2 INJECTION, SOLUTION INTRAMUSCULAR; INTRAVENOUS; SUBCUTANEOUS
Status: CANCELLED | OUTPATIENT
Start: 2021-06-21

## 2021-06-07 RX ORDER — METHYLPREDNISOLONE SODIUM SUCCINATE 125 MG/2ML
125 INJECTION, POWDER, LYOPHILIZED, FOR SOLUTION INTRAMUSCULAR; INTRAVENOUS ONCE
Status: CANCELLED
Start: 2021-06-21

## 2021-06-07 RX ORDER — MEPERIDINE HYDROCHLORIDE 25 MG/ML
25 INJECTION INTRAMUSCULAR; INTRAVENOUS; SUBCUTANEOUS EVERY 30 MIN PRN
Status: CANCELLED | OUTPATIENT
Start: 2021-06-21

## 2021-06-07 RX ORDER — DIPHENHYDRAMINE HCL 25 MG
25 CAPSULE ORAL ONCE
Status: CANCELLED
Start: 2021-06-21

## 2021-06-07 RX ORDER — ALBUTEROL SULFATE 0.83 MG/ML
2.5 SOLUTION RESPIRATORY (INHALATION)
Status: CANCELLED | OUTPATIENT
Start: 2021-06-21

## 2021-06-07 RX ORDER — EDARAVONE 30 MG/100ML
30 INJECTION, SOLUTION INTRAVENOUS
Status: CANCELLED
Start: 2021-06-21

## 2021-06-07 RX ORDER — METHYLPREDNISOLONE SODIUM SUCCINATE 125 MG/2ML
125 INJECTION, POWDER, LYOPHILIZED, FOR SOLUTION INTRAMUSCULAR; INTRAVENOUS
Status: CANCELLED
Start: 2021-06-21

## 2021-06-07 RX ORDER — HEPARIN SODIUM (PORCINE) LOCK FLUSH IV SOLN 100 UNIT/ML 100 UNIT/ML
5 SOLUTION INTRAVENOUS
Status: CANCELLED | OUTPATIENT
Start: 2021-06-21

## 2021-06-07 RX ORDER — EPINEPHRINE 1 MG/ML
0.3 INJECTION, SOLUTION, CONCENTRATE INTRAVENOUS EVERY 5 MIN PRN
Status: CANCELLED | OUTPATIENT
Start: 2021-06-21

## 2021-06-08 ENCOUNTER — OFFICE VISIT - HEALTHEAST (OUTPATIENT)
Dept: FAMILY MEDICINE | Facility: CLINIC | Age: 66
End: 2021-06-08

## 2021-06-08 DIAGNOSIS — K29.80 DUODENITIS: ICD-10-CM

## 2021-06-08 DIAGNOSIS — G12.22 PROGRESSIVE BULBAR PALSY (H): ICD-10-CM

## 2021-06-08 DIAGNOSIS — D75.89 MACROCYTOSIS: ICD-10-CM

## 2021-06-09 NOTE — TELEPHONE ENCOUNTER
"  CC:  Slurring words at times + occasional problems swallowing       \"lump in my throat\" at times       Has been going on for 2-3 months - family has been encouraging her to be seen for this so decided to call      Works from home now     Awake, alert   No numbness / tingling anywhere   No specific weakness anywhere       Sounds clear on the phone - clear voice - no slurred words heard         Pt declined other than [facility] - soonest to be seen there is tomorrow - she did make appt with provider there       Sumit Walls rn      COVID 19 Nurse Triage Plan/Patient Instructions    Please be aware that novel coronavirus (COVID-19) may be circulating in the community. If you develop symptoms such as fever, cough, or SOB or if you have concerns about the presence of another infection including coronavirus (COVID-19), please contact your health care provider or visit [email or web address].     Disposition/Instructions    In-Person Visit with provider recommended. Reference Visit Selection Guide.    Thank you for taking steps to prevent the spread of this virus.  o Limit your contact with others.  o Wear a simple mask to cover your cough.  o Wash your hands well and often.    Resources    [facility]: About COVID-19: [email or web address]    CDC: What to Do If You're Sick: www.cdc.gov/coronavirus/2019-ncov/about/steps-when-sick.html    CDC: Ending Home Isolation: www.cdc.gov/coronavirus/2019-ncov/hcp/disposition-in-home-patients.html     CDC: Caring for Someone: www.cdc.gov/coronavirus/2019-ncov/if-you-are-sick/care-for-someone.html     [facility]: Interim Guidance for Hospital Discharge to Home: www.health.[facility].[place]/diseases/coronavirus/hcp/hospdischarge.pdf    [facility] clinical trials (COVID-19 research studies): [email or web address]     Below are the COVID-19 hotlines at the [place] Department of Health ([facility]). Interpreters are available.   o For health questions: " Call 587-128-8794 or 1-916.323.8804 (7 a.m. to 7 p.m.)  o For questions about schools and childcare: Call 204-375-2418 or 1-693.899.3899 (7 a.m. to 7 p.m.)             Reason for Disposition    Neurologic deficit of gradual onset, ANY of the following: * Weakness of the face, arm, or leg on one side of the body * Numbness of the face, arm, or leg on one side of the body * Loss of speech or garbled speech    Additional Information    Negative: Difficult to awaken or acting confused (e.g., disoriented, slurred speech)    Negative: New neurologic deficit that is present NOW, sudden onset of ANY of the following: * Weakness of the face, arm, or leg on one side of the body * Numbness of the face, arm, or leg on one side of the body * Loss of speech or garbled speech    Negative: Sounds like a life-threatening emergency to the triager    Negative: Confusion, disorientation, or hallucinations is the main symptom    Negative: Dizziness is the main symptom    Negative: Followed a head injury within last 3 days    Negative: Headache (with neurologic deficit)    Negative: Unable to urinate (or only a few drops) and bladder feels very full    Negative: Loss of control of bowel or bladder (i.e., incontinence) of new onset    Negative: Back pain with numbness (loss of sensation) in groin or rectal area    Negative: Patient sounds very sick or weak to the triager    Negative: Neurologic deficit that was brief (now gone), ANY of the following: * Weakness of the face, arm, or leg on one side of the body * Numbness of the face, arm, or leg on one side of the body * Loss of speech or garbled speech    Protocols used: NEUROLOGIC DEFICIT-A-[place]

## 2021-06-09 NOTE — PATIENT INSTRUCTIONS - HE
1. Sertraline 25 mg at bedtime daily.  2. You will get a call to schedule for CT scan.  3. You will get a call to schedule with orthopedist.  4. We will notify you of lab results.

## 2021-06-09 NOTE — PROGRESS NOTES
Assessment/Plan:      Problem List Items Addressed This Visit     None      Visit Diagnoses     Pain in both hands    -  Primary    Relevant Orders    Ambulatory referral to Orthopedics    Generalized anxiety disorder        Relevant Medications    sertraline (ZOLOFT) 25 MG tablet    Spell of change in speech        Relevant Orders    CT Head Without Contrast    HM2(CBC w/o Differential) (Completed)    Comprehensive Metabolic Panel (Completed)    Thyroid Stimulating Hormone (TSH) (Completed)    Dysphagia, unspecified type        Relevant Orders    CT Soft Tissue Neck With Contrast      Neurologic exam is normal but symptoms of speech changes and memory problems concerning for possible intracranial etiology. I am recommending CT of the head as well as labs as above. She does also have some generalized anxiety disorder with possible overlap of depression. Some of her symptoms may be related to pseudodementia due to her anxiety/depression. We discussed treatment options and she was willing to start sertraline 25 mg daily.    Patient Instructions   1. Sertraline 25 mg at bedtime daily.  2. You will get a call to schedule for CT scan.  3. You will get a call to schedule with orthopedist.  4. We will notify you of lab results.      Subjective:   Bailey Malhotra is a 65 y.o. female who presents today with concerns about slurring her speech intermittently for 3-4 months. She reports that family and people at work have noticed. She is also noticing some discomfort in her throat and she can sometimes have pills get stuck. She hasn't ever had to vomit to clear an obstruction. She doesn't have any trouble finding her words, it is just how the words are coming out. No double vision. No changes in her gait. No weakness. Her son has noticed that her memory does seem to be decreasing.    She is also complaining of pain in both of her hands at the base of her thumbs. This has been ongoing for over a year. She does get some radiation  of the pain up her arms and into her hands.    The patient does have some underlying anxiety and those symptoms have been worsening over the past several months with ongoing work and family stress. She would be willing to try a medication at this time. No thoughts of hurting herself or others.    Patient Active Problem List   Diagnosis     Hematuria     CXR Lungs Solitary Pulmonary Nodule (___ Cm)     Second degree uterine prolapse     OAB (overactive bladder)      Past Medical History:   Diagnosis Date     Cholelithiasis      GERD (gastroesophageal reflux disease)      Past Surgical History:   Procedure Laterality Date     TUBAL LIGATION         Review of System: Relevant items noted in HPI. ROS otherwise negative.       Objective:     Vitals:    07/21/20 1447   BP: 100/62   Pulse: 84   Weight: 150 lb 11.2 oz (68.4 kg)       Physical Exam  Constitutional:       General: She is not in acute distress.     Appearance: She is not ill-appearing.   HENT:      Right Ear: Tympanic membrane and ear canal normal.      Left Ear: Tympanic membrane and ear canal normal.      Mouth/Throat:      Mouth: Mucous membranes are moist.      Pharynx: Oropharynx is clear. No oropharyngeal exudate.   Neck:      Musculoskeletal: Normal range of motion. No muscular tenderness.      Thyroid: No thyroid mass or thyromegaly.      Trachea: Trachea normal.   Cardiovascular:      Rate and Rhythm: Normal rate and regular rhythm.      Heart sounds: No murmur.   Pulmonary:      Effort: Pulmonary effort is normal.      Breath sounds: Normal breath sounds. No wheezing or rhonchi.   Abdominal:      General: There is no distension.      Palpations: There is no mass.      Tenderness: There is no abdominal tenderness.   Lymphadenopathy:      Cervical: No cervical adenopathy.   Neurological:      General: No focal deficit present.      Mental Status: She is oriented to person, place, and time.      Cranial Nerves: No cranial nerve deficit.      Motor: No  weakness.      Coordination: Coordination normal.      Gait: Gait normal.   Psychiatric:         Attention and Perception: Attention normal.         Mood and Affect: Affect is blunt.         Speech: Speech normal.         Behavior: Behavior normal.     PHQ-9 Total Score: 19 (7/21/2020  3:00 PM)   FARHAD-7 Total: 19 (7/21/2020  3:00 PM)

## 2021-06-10 NOTE — PROGRESS NOTES
"CHIEF COMPLAINT:   Chief Complaint   Patient presents with     Dysphagia     for the past year, ct done 7/21           HISTORY OF PRESENT ILLNESS    Bailey\"was seen at the behest of Danna Burns for evaluation of abnormal CT scan.    No throat pain.   She has \"sinus drainage\" her life.  Worse lately.   Cough and chokes on liquids (coffee or pop) several times a week.   No fever chills, night sweats.   Some weight loss (10 lbs in past 3-4 months).  Works from home and \"doesn't eat much takes care of special needs grandson (age 20) - has G-tube. Nonverbal    Some globus sensation on the left with swallowing.  Currently smoking 1 PPD.   50 pack year history.   Drinks 1 pepsi (12 oz) can a day.  Drinks 1 cup of coffee in morning. No spicy foods..  Fried foods occasional.   Decreased appetite recently.   Admits to increase stress as caretaker.     RSI = 27 (see scanned RSI questionnaire)      3/2020 visit:    Patient has not been seen in primary care for some time and has had no real medical follow-up with regards to screening procedures.  Patient has been busy caring for a chronically ill grandchild and has neglected her health.      She is a smoker and has been since teenage years.    She does complain of a chronic cough and postnasal drip and is being seen here at the recommendation of her son because he feels over the last 6 months or so she has been slurring some of her words.      7/2020 CT Neck    Mild asymmetric soft tissue fullness in the right posterior oral cavity and the region of the glossotonsillar sulcus, extending towards the base of tongue. This may simply represent redundancy of normal mucosa, though underlying mucosal lesion cannot   be entirely excluded, especially given the associated metallic streak artifact from dental amalgam. Recommend correlation with direct visualization and consider ENT consultation for further evaluation.    Active Non-Hospital Problems    Diagnosis     Abnormal CT scan, " neck     OAB (overactive bladder)     Second degree uterine prolapse     Hematuria     Created by Conversion         CXR Lungs Solitary Pulmonary Nodule (___ Cm)     Created by Conversion              REVIEW OF SYSTEMS    Review of Systems: a 10-system review is reviewed at this encounter.  See scanned document.     Patient has no known allergies.     PHYSICAL EXAM:     CONSTITUTIONAL:   General Appearance:  Normal, well developed, well nourished, no obvious distress     HEAD: Normal appearance and Symmetry:  No cutaneous lesions.      EARS:    Clinical speech reception threshold:  Normal, able to hear normal speech.  Auricles:  Normal    Normal TM's bilaterally. Normal auditory canals and external ears. Non-tender.       NOSE:    Architecture:  Normal, Grossly normal external nasal architecture with no masses or lesions.  Mucosa:  Normal mucosa, No polyps or masses.  Septum:  Normal   Turbinates:  Normal, No turbinate abnormalities     ORAL CAVITY/OROPHARYNX    Lips:  Normal.  Mucosa:  Normal, Moist mucous membranes.  Tongue:  Normal, midline  Palate: Normal  Oral pharynx:  Normal,   Tonsil Hypertrophy:2+     NECK    Lymphadenopathy:  None  Trachea:  midline.  Thyroid:  Normal         NEUROLOGIC:   Normal gait and station     RESPIRATORY:   Symmetry and Respiratory effort         FLEX LARYNGOSCOPY:    After obtaining consent, a flexible laryngoscope is used to examine both nasal cavities, the nasopharynx, pharnx, and larynx.      Nasal cavity: nl  NP: nl  Pharnx: mild inflammation; cobblestoning  Larynx:  TVCs sharp, mobile, BOT symmetric,         IMPRESSION:    Encounter Diagnoses   Name Primary?     Hoarseness Yes     Dysphagia, unspecified type      Globus sensation      Tobacco abuse disorder           RECOMMENDATIONS:  Acid Suppression Treatment    Start protonix 40 mg in morning as directed.  If you wish, you can also add pepid at bedtime if you have having symptoms at night  If symptoms have not resolved,  then please follow up with Dr. Delroy Hernandez, at the [facility]  I will see you back in 3 months time.     Lifestyle changes:    Avoid eating 2-3 hours before bedtime.   You may find it helpful to elevate the head of your bed.     Avoid following foods that are likely to trigger acid reflux:    Coffee or tea   Anything that s fizzy or has caffeine in it  Alcohol   Citrus fruits, such as oranges and nettie  Tomato based foods (salsa, pizza, lasanga)  Chocolate   Mint or peppermint  Fatty foods (ice cream)  Spicy foods  Onions and garlic

## 2021-06-12 NOTE — PROGRESS NOTES
"HPI: I am consulted in this 62 y.o. female who has been experiencing some problems with abdominal pain. she has noted this for about few year(s). It has been occurring about 0.5 times per week. It is severe.  It is precipitated by diet. It is associated with nausea or vomiting.   It is not associated with chills or fevers.    Allergies:Review of patient's allergies indicates no known allergies.    Past Medical History:   Diagnosis Date     Cholelithiasis      GERD (gastroesophageal reflux disease)        Past Surgical History:   Procedure Laterality Date     TUBAL LIGATION         CURRENT MEDS:      History reviewed. No pertinent family history.     reports that she has been smoking.  She has never used smokeless tobacco. She reports that she drinks alcohol. She reports that she does not use illicit drugs.    Review of Systems - Pertinent items are noted in HPI.  A 12 point comprehensive review of systems was negative except as noted.    BP (!) 136/91 (Patient Site: Right Arm, Patient Position: Sitting, Cuff Size: Adult Regular)  Pulse (!) 104  Ht 5' 4\" (1.626 m)  Wt 181 lb (82.1 kg)  SpO2 96%  Breastfeeding? No  BMI 31.07 kg/m2      EXAM:  GENERAL: This is a obese female in no distress.  MOUTH: Mucus membranes moist  SKIN: Grossly intact  EYES: No icterus  CARDIAC: RRR w/out murmur  CHEST/LUNG: Clear  ABDOMEN: Soft, nontender RUQ non-tender, B/S present, Baker's sign negative  BACK: No costovertebral tenderness  NEURO: Alert and oriented, nonfocal  PSYCHIATRIC: normal affect      LABS:  Lab Results   Component Value Date    WBC 10.2 08/17/2017    HGB 12.8 08/17/2017    HCT 38.0 08/17/2017    MCV 96 08/17/2017     08/17/2017     INR/Prothrombin Time    Results from last 7 days  Lab Units 08/17/17  2258   LN-SODIUM mmol/L 139   LN-POTASSIUM mmol/L 3.9   LN-CHLORIDE mmol/L 103   LN-CO2 mmol/L 26   LN-BLOOD UREA NITROGEN mg/dL 7*   LN-CREATININE mg/dL 0.77   LN-CALCIUM mg/dL 9.3     Lab Results "   Component Value Date    ALT 14 08/17/2017    AST 18 08/17/2017    ALKPHOS 116 08/17/2017    BILITOT 0.2 08/17/2017       IMAGES:     Assessment/Plan: Pt with signs and symptoms consistent with symptomatic cholelithiasis. I have recommended a laparoscopic cholecystecomy. I discussed with her that we might not be able to do this  laparoscopically. I went over some of the risks of surgery including but not limited to bleeding, infection and bile duct injury.  Discussed risks and benefits the procedure and the techniques involved and answered all questions will get her scheduled in the very near future.      Kalen Ledezma MD  [facility] Department of Surgery

## 2021-06-12 NOTE — PROGRESS NOTES
Bailey is scheduled for lap tania with Dr. Ledezma on 8/31/17 at [facility]. Patient was given instructions of arrival time, need a , pre-op physical within 30days and NPO after midnight. Patient verbalized understanding.       Chrissie Hall [facility]  Physician    [facility]   [phone]

## 2021-06-12 NOTE — PROGRESS NOTES
Pre-op Examination    Assessment/Plan:     Visit for Preoperative Exam.    1. Pre-op exam     2. Gall bladder disease     3. OAB (overactive bladder)     4. Reflux gastritis  omeprozole 10 mg  Q D       Patient approved for surgery with general or local anesthesia.          Subjective:   This is a preoperative consultation requested by Dr. Ledezma  in preparation for lap cholecystectomy on 8-31-17 at [facility], fax [phone].    Scheduled Procedure: Cholecystectomy  Surgery Date:  08/3/2017  Surgery Location:  [facility] Fax:[phone], [phone]  Surgeon:  Dr. Ledezma    Current Outpatient Prescriptions   Medication Sig Dispense Refill     L GASSERI/B BIFIDUM/B LONGUM (PROBIOTIC COLON CARE ORAL) Take by mouth daily.       multivitamin with minerals (THERA-M) 9 mg iron-400 mcg Tab tablet Take 1 tablet by mouth daily.       omeprazole (PRILOSEC) 10 MG capsule Take 10 mg by mouth daily.       aspirin 81 mg chewable tablet Chew 81 mg daily.       Lactobacillus rhamnosus GG (CULTURELLE) 10-15 Billion cell capsule Take 1 capsule by mouth daily.       ondansetron (ZOFRAN ODT) 4 MG disintegrating tablet Take 1 tablet (4 mg total) by mouth every 6 (six) hours as needed for nausea. 20 tablet 0     oxyCODONE-acetaminophen (PERCOCET) 5-325 mg per tablet Take 1 tablet by mouth every 6 (six) hours as needed for pain. 15 tablet 0     No current facility-administered medications for this visit.        No Known Allergies    Immunization History   Administered Date(s) Administered     Influenza, inj, historic 10/08/2014     Tdap 04/01/2013       Patient Active Problem List   Diagnosis     Hematuria     CXR Lungs Solitary Pulmonary Nodule (___ Cm)     Second degree uterine prolapse     OAB (overactive bladder)       Past Medical History:   Diagnosis Date     Cholelithiasis      GERD (gastroesophageal reflux disease)        Social History     Social History     Marital status:      Spouse  "name: N/A     Number of children: N/A     Years of education: N/A     Occupational History     Not on file.     Social History Main Topics     Smoking status: Current Every Day Smoker     Smokeless tobacco: Never Used      Comment: 10-15 per night/weekend     Alcohol use Yes      Comment: Occasional     Drug use: No     Sexual activity: Not on file     Other Topics Concern     Not on file     Social History Narrative       Past Surgical History:   Procedure Laterality Date     TUBAL LIGATION         ROS: Pertinent to surgery:  Fever: no  Chills: no  Fatigue: no  Chest Pain: no  Cough: no  Dyspnea: no  Urinary Frequency: no  Nausea: no  Vomiting: no  Diarrhea: no  Abdominal Pain: yes, pain attack last week now resolved.  Easy Bruising: no  Lower Extremity Swelling: no  Poor Exercise Tolerance: no    Most recent Health Maintenance Visit:  12 mo. month(s) ago    Pertinent History  Prior Anesthesia: yes=tubal and colonoscopy  Previous Anesthesia Reaction:  no  Diabetes: no  Cardiovascular Disease: no  Pulmonary Disease: no  Renal Disease: no  GI Disease: yes, gall stones with pain  Sleep Apnea: no  Thromboembolic Problems: no  Clotting Disorder: no  Bleeding Disorder: no  Transfusion Reaction: no, never had one  Impaired Immunity: no  Steroid use in the last 6 months: no  Frequent Aspirin use: yes, quite last week    HPI:  Bailey is here for preop. She has had years of epigastric pain. Recently has \" attack \" and went to er- last Thursday  Us pos for stones.  Has Seen Dr Ledezma and plans mad to remove the gall bladder.    Review of systems:     History obtained from the patient.  General ROS: negative  Psychological ROS: negative  ENT ROS: negative  Endocrine ROS: negative  Respiratory ROS: no cough, shortness of breath, or wheezing  Cardiovascular ROS: no chest pain or dyspnea on exertion  Gastrointestinal ROS: positive for - abdominal pain, appetite loss and change in stools  Gyn ROS: negative  overactive bladder- " uterus sits on bladder  Musculoskeletal ROS: negative  Neurological ROS: negative  Dermatological ROS: negative        A comprehensive review of systems was performed and was otherwise negative    No Known Allergies    Recent antiplatelet use: no  Prolonged steroid use in the past year: no  Past difficulty with anesthesia:  no  Family history of difficulty with anesthesia: no  Current cardiopulmonary symptoms: no    General Appearance:  Alert, cooperative, no distress, appears stated age   Head:  Normocephalic, without obvious abnormality, atraumatic   Eyes:  PERRL, conjunctiva/corneas clear, EOM's intact   Ears:  Normal TM's and external ear canals, both ears   Nose: Nares normal, septum midline,mucosa normal, no drainage    Throat: Lips, mucosa, and tongue normal; teeth and gums normal   Neck: Supple, symmetrical, trachea midline, no adenopathy;  thyroid: not enlarged, symmetric, no tenderness/mass/nodules; no carotid bruit or JVD   Back:   Symmetric, no curvature, ROM normal, no CVA tenderness   Lungs:   Clear to auscultation bilaterally, respirations unlabored   Heart:  Regular rate and rhythm, S1 and S2 normal, no murmur, rub, or gallop   Abdomen:   Soft, non-tender, bowel sounds active, no masses, no organomegaly no RUQ pain or epigastric pain       Extremities: Extremities normal, atraumatic, no cyanosis or edema   Skin: Skin color, texture, turgor normal, no rashes or lesions   Lymph nodes: Cervical, supraclavicular, and axillary nodes normal   Neurologic: Normal, CN 2-12 intact   hgb 12.8.  US gall stones -at neck of bladder- with gall bladder wall thickening  After surgery, the patient plans to recover at home with family.    Ghislaine Wright MD  [facility]  [phone]      [facility]    This note has been dictated using voice recognition software. Any grammatical or context distortions are unintentional and inherent to the software.

## 2021-06-15 DIAGNOSIS — G12.21 ALS (AMYOTROPHIC LATERAL SCLEROSIS) (H): Primary | ICD-10-CM

## 2021-06-15 NOTE — PROGRESS NOTES
HISTORY OF PRESENT ILLNESS  Patient comes in for recheck of swallowing concerns. Patient reports that she has problems speaking and she can't swallow. Problem going on for 6 months and getting worse.     REVIEW OF SYSTEMS  Review of Systems: a 10-system review was performed. Pertinent positives are noted in the HPI and on a separate scanned document in the chart.    PMH, PSH, FH and SH has documented in the EHR.      EXAM    CONSTITUTIONAL  General Appearance:  Normal, well developed, well nourished, no obvious distress  Ability to Communicate:  communicates appropriately.    HEAD AND FACE  Appearance and Symmetry:  Normal, no scalp or facial scarring or suspicious lesions.    EYE  Normal external eye, conjunctiva, lids, cornea.     EARS  Clinical speech reception threshold:  Normal, able to hear normal speech.  Auricle:  Normal, Auricles without scars, lesions, masses.  External auditory canal:  Normal, External auditory canal normal.  Tympanic membrane:  Normal, Tympanic membranes normal without swelling or erythema.    NOSE (speculum or scope)  Architecture:  Normal, Grossly normal external nasal architecture with no masses or lesions.  Mucosa:  Normal mucosa, No polyps or masses.  Septum:  Normal, Septum non-obstructing.  Turbinates:  Normal, No turbinate abnormalities    ORAL CAVITY AND OROPHARYNX  Lips:  Normal.  Dental and gingiva:  Normal, No obvious dental or gingival disease.  Mucosa:  Normal, Moist mucous membranes.  Tongue:  Tongue fasciculations  Hard and soft palate:  Normal, Hard and soft palate without cleft or mucosal lesions.  Oral pharynx:  Normal, Posterior pharynx without lesions or remarkable asymmetry.  Saliva:  Normal, Clear saliva.  Masses:  Normal, No palpable masses or pathologically enlarged lymph nodes.    LARYNGOSCOPY  Risks and benefit of Flexible laryngeal endoscopy were discussed with the patient and they wished to proceed. Patient tolerated the procedure well.  See exam note for  findings.    LARYNX AND HYPOPHARYNX (scope)  Voice Quality:  Normal voice quality.  Supra glottis:  Normal, No edema or erythema.  Epiglottis:  Normal, No epiglottic mass or mucosal lesions.  Pyriform sinuses:  Normal, Pyriform sinuses clear.  Vocal cords appearance:  Normal, Vocal cord motion symmetric.  Vocal cord motion:  Normal, Vocal cord motion symmetric  Endolarynx:  Normal, No Endolaryngeal mass or mucosal lesions.    NECK  Masses/lymph nodes:  Normal, No worrisome neck masses or lymph nodes.  Salivary glands:  Normal, Parotid and submandibular glands.  Trachea and larynx position:  Normal, Trachea and larynx midline.  Thyroid:  Normal, No thyroid abnormality.  Tenderness:  Normal, No cervical tenderness.  Suppleness:  Normal, Neck supple    CARDIOVASCULAR  Regular rate and rhythm.  No cyanosis, clubbing or edema.    PULSES  Carotid pulses normal    NEUROLOGICAL  Speech pattern:  Normal, Proasaic    RESPIRATORY  Symmetry and Respiratory effort:  Normal, Symmetric chest movement and expansion with no increased intercostal retractions or use of accessory muscles.     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 There is no obvious lesion within the upper aerodigestive tract to explain her symptoms. She is seeing Neurology next week.     RECOMMENDATION  Swallow study. I am concerned her risk of aspiration. I explained that she needs to be evaluated with a speech pathologist.      Los Flanagan MD

## 2021-06-15 NOTE — PROGRESS NOTES
Assessment/Plan:    1. Dysarthria  Dysarthria noted x6 months, progressive.  Unclear etiology with remainder of neurologic exam nonfocal.  Referred to see neurology.  Also ENT assessment to be completed.  MRI brain cow carotid with and without contrast.  Labs assessment as noted below.  Will notify with results.  - Ambulatory referral to Neurology  - Ambulatory referral to ENT  - MR Brain COW Carotid With and Without Contrast; Future  - Thyroid Stimulating Hormone (TSH)  - Comprehensive Metabolic Panel  - HM2(CBC w/o Differential)  - Vitamin B12  - Erythrocyte Sedimentation Rate  - C-Reactive Protein    2. Dysphagia, unspecified type  Dysphagia with liquids at times as well as dysarthric speech noted.  ENT referral.  - Ambulatory referral to ENT    3. Tobacco abuse  Continues to smoke unfortunately 1 pack/day and declines smoking cessation at this time.    4. Other symptoms and signs involving the nervous system   As above.  - MR Brain COW Carotid With and Without Contrast; Future      I have counseled the patient for tobacco cessation and the follow up will occur  at the next visit.         Subjective:    Bailey Malhotra is seen today for difficulty with pronouncing words with slurred speech described.  Symptoms over past 6 months.  Occasional phlegm production.  Has been clearing her throat her entire life.  Has been choking more liquids recently.  Normal appetite.  No known history of CVA or TIA described.  Did have CT scan last summer which had age-related changes otherwise no mass, hemorrhage etc.  No recent injury.  Has been on sertraline for anxiety typically 25 mg daily.  Had some GI in the past for reflux concerns.  Used pantoprazole 40 mg daily x3 months however stopped due to constipation and did not notice any change in reflux or dysarthric speech.  Comprehensive review of systems as above otherwise all negative.      2 sons - oldest son with a son with special needs (they both live with her)  1  "daughter -   Tobacco:  1 ppd  EtOH:  infrequent  Mom -  77 \" in sleep\"; possible slight CVA  Dad - uses walker to prevent falls  1 younger bro - he is in a nursing home due to \"bad stroke and wasn't found for 2 days\"  Surgeries:  lap choly  Hospitalizations:  none  Retired -  with [employer]  Hobbies:  reading, cleaning, laundry...    Past Surgical History:   Procedure Laterality Date     TUBAL LIGATION          No family history on file.     Past Medical History:   Diagnosis Date     Cholelithiasis      GERD (gastroesophageal reflux disease)         Social History     Tobacco Use     Smoking status: Current Every Day Smoker     Smokeless tobacco: Never Used     Tobacco comment: 10-15 per night/weekend   Substance Use Topics     Alcohol use: Yes     Comment: Occasional     Drug use: No        Current Outpatient Medications   Medication Sig Dispense Refill     aspirin 81 mg chewable tablet Chew 81 mg daily.       calcium carbonate-vitamin D3 (CALCIUM 600 + D,3,) 600 mg(1,500mg) -400 unit per tablet Take 1 tablet by mouth daily.       diphenhydrAMINE-acetaminophen (TYLENOL PM EXTRA STRENGTH)  mg Tab Take 2 tablets by mouth.       multivitamin with minerals (THERA-M) 9 mg iron-400 mcg Tab tablet Take 1 tablet by mouth daily.       sertraline (ZOLOFT) 25 MG tablet Take 1 tablet (25 mg total) by mouth at bedtime. 90 tablet 3     No current facility-administered medications for this visit.           Objective:    Vitals:    21 1034   BP: 130/70   Pulse: 85   Temp: 98.1  F (36.7  C)   SpO2: 98%   Weight: 161 lb (73 kg)      Body mass index is 27.64 kg/m .    Alert.  No apparent distress.  Dysarthric speech noted.  Cranial nerve exam otherwise appears intact.  Neck supple.  No carotid bruit.  Chest clear.  Cardiac exam regular.  No pronator drift.  Distal CMS intact.  Deep tendon reflexes 2+ symmetric upper and lower extremities.      EXAM: CT HEAD WO CONTRAST  LOCATION: [facility]  DATE/TIME: 8/4/2020 3:02 PM     INDICATION: Intermittent slurred speech, dysphagia  COMPARISON: None.  TECHNIQUE: Routine without IV contrast. Multiplanar reformats. Dose reduction techniques were used.     FINDINGS:  INTRACRANIAL CONTENTS: No intracranial hemorrhage, extraaxial collection, or mass effect.  No CT evidence of acute infarct. Mild presumed chronic small vessel ischemic changes. Mild generalized volume loss. No hydrocephalus.      VISUALIZED ORBITS/SINUSES/MASTOIDS: No intraorbital abnormality. No paranasal sinus mucosal disease. No middle ear or mastoid effusion.     BONES/SOFT TISSUES: No acute abnormality.        IMPRESSION:   1.  No acute intracranial hemorrhage, mass effect, or CT evidence for acute infarction.  2.  Mild age-related changes.        This note has been dictated using voice recognition software and as a result may contain minor grammatical errors and unintended word substitutions.

## 2021-06-15 NOTE — TELEPHONE ENCOUNTER
Received refill request from [facility] for pantoprazole -- not on file. Per note from 2/19/21:     Used pantoprazole 40 mg daily x3 months however stopped due to constipation and did not notice any change in reflux or dysarthric speech.      Ok to refill?

## 2021-06-15 NOTE — TELEPHONE ENCOUNTER
Reason contacted:  results   Information relayed:  Below message   Additional questions:  No  Further follow-up needed:  No  Okay to leave a detailed message:  No

## 2021-06-15 NOTE — TELEPHONE ENCOUNTER
----- Message from Daniel Fortune MD sent at 2/25/2021  6:34 PM CST -----  Notify patient that her head MRI did not show any evidence for recent stroke, etc.  Otherwise normal head MRI for age.

## 2021-06-16 NOTE — PROGRESS NOTES
Assessment/Plan:    1. Dysarthria  Dysarthria concerns with progression.  Worse over past 6 months.  Saw Dr. Los Flanagan with ENT.  Bulbar palsy concerned with recommendation for speech pathology and swallow study completion.  Swallow study scheduled for April 5, 2021 with esophagram April 5, 2021.  Patient also will follow up with Dr. Barriga with neurology for EMG April 1, 2021 as scheduled.    2. Dysphagia, unspecified type  As above, esophagram with swallow study with speech referral scheduled April 5, 2021.  Question of bulbar palsy.    3. Generalized anxiety disorder  Generalized anxiety.  Remains off sertraline to ensure that this is not contributing however has not seen improvement in the last month or 2.  FARHAD-7 questionnaire 14 out of 21 with PHQ-9 questionnaire 9 out of 27.  We will continue to monitor.    4. Tobacco abuse  Tobacco use.  1 pack/day still.  Declines smoking cessation efforts.    5. Hand dermatitis  Betamethasone 0.05% ointment twice daily sparingly to affected areas of bilateral hand palmar dermatitis.  - betamethasone, augmented, (DIPROLENE) 0.05 % ointment; apply topically sparingly to affected areas of palms twice daily as needed  Dispense: 15 g; Refill: 0        Subjective:    Bailey Malhotra is seen today for progressive concerns with speech difficulties and swallowing difficulties.  Was referred to 8 ENT and saw Dr. Andre Flanagan and told to complete swallow study with speech therapy as well as esophagram scheduled April 5, 2021.  Seen by Dr. Barriga neurologist and has EMG scheduled April 1, 2021.  No unintentional weight loss.  No fever.  Continues to smoke 1 pack/day.  History of anxiety and had been on sertraline historically however stopped in the last month or 2.  Also discontinued pantoprazole 40 mg daily in case this was causing any issues with speech difficulties etc.  Did have work-up previously including diagnostic imaging including MR head limited without contrast with  "results showing normal head MRI for age without acute infarct, mass lesions or hemorrhage.  Comprehensive review of systems as above otherwise all negative.       2 sons - oldest son with a son with special needs (they both live with her)   1 daughter -   Tobacco:  1 ppd   EtOH:  infrequent   Mom -  77 \" in sleep\"; possible slight CVA   Dad - uses walker to prevent falls   1 younger bro - he is in a nursing home due to \"bad stroke and wasn't found for 2 days\"   Surgeries:  lap choly   Hospitalizations:  none   Retired -  with [employer]   Hobbies:  reading, cleaning, laundry...    Past Surgical History:   Procedure Laterality Date     TUBAL LIGATION          No family history on file.     Past Medical History:   Diagnosis Date     Cholelithiasis      GERD (gastroesophageal reflux disease)         Social History     Tobacco Use     Smoking status: Current Every Day Smoker     Smokeless tobacco: Never Used     Tobacco comment: 10-15 per night/weekend   Substance Use Topics     Alcohol use: Yes     Comment: Occasional     Drug use: No        Current Outpatient Medications   Medication Sig Dispense Refill     aspirin 81 mg chewable tablet Chew 81 mg daily.       calcium carbonate-vitamin D3 (CALCIUM 600 + D,3,) 600 mg(1,500mg) -400 unit per tablet Take 1 tablet by mouth daily.       diphenhydrAMINE-acetaminophen (TYLENOL PM EXTRA STRENGTH)  mg Tab Take 2 tablets by mouth.       multivitamin with minerals (THERA-M) 9 mg iron-400 mcg Tab tablet Take 1 tablet by mouth daily.       pantoprazole (PROTONIX) 40 MG tablet Take 1 tablet in morning for 90 days) 90 tablet 1     betamethasone, augmented, (DIPROLENE) 0.05 % ointment apply topically sparingly to affected areas of palms twice daily as needed 15 g 0     sertraline (ZOLOFT) 25 MG tablet Take 1 tablet (25 mg total) by mouth at bedtime. 90 tablet 3     No current facility-administered medications for this visit.     "       Objective:    Vitals:    03/25/21 1304   BP: 140/60   Pulse: 100   Temp: 97.4  F (36.3  C)   SpO2: 98%   Weight: 165 lb (74.8 kg)      Body mass index is 28.32 kg/m .    Alert.  Dysarthric speech.  Cooperative and forthcoming.  Hard to blow out cheeks while holding lips tightly closed.  Describes difficulty blowing out a candle.  Sensory exam and cranial nerve exam otherwise appears symmetric.  Chest clear.  Cardiac exam regular.  Negative Romberg.  No pronator drift.      This note has been dictated using voice recognition software and as a result may contain minor grammatical errors and unintended word substitutions.

## 2021-06-16 NOTE — PROGRESS NOTES
Assessment/Plan:    1. 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  Has seen Dr. Phi Perkins at the [facility] Department of neurology.  Described pulmonary function tests mildly abnormal.  Does not require BiPAP.  Would benefit from speech therapy.  Was prescribed Nuedexta 20/10 using 1 tablet daily x7 days then 1 tablet every 12 hours x7 days however this prescription was $600 and is looking for treatment alternative for cost savings.  Did review healthcare directive completion with patient with paperwork provided.  Encourage patient to schedule COVID-19 vaccine series at earliest convenience.    2. Macrocytosis  Mild macrocytosis with recent B12 level normal.  Check serum folate level.  Repeat CBC.  - Folate, Serum    3. Abnormal LFTs  LFT elevation recently.  Diagnosed with duodenitis the emergency room visit March 30, 2021 as noted below.  Repeat LFTs to ensure stable or improved due to risk of LFT elevation with Nuedexta.    - HM2(CBC w/o Differential)  - Comprehensive Metabolic Panel    4. Duodenitis  Recent emergency room assessment March 30, 2021 with CT findings below with thickened duodenum consistent with duodenitis and was prescribed lansoprazole 30 mg daily and famotidine 20 mg twice daily.  Told to use for 90 days.  Repeat LFTs.  History of lap tania historically.  Lipase levels were normal.  No abdominal distention with positive bowel sounds.  Notify with worsening.  - lansoprazole (PREVACID) 30 MG capsule; Take 1 capsule (30 mg total) by mouth daily.  Dispense: 30 capsule; Refill: 2  - famotidine (PEPCID) 20 MG tablet; Take 1 tablet (20 mg total) by mouth 2 (two) times a day.  Dispense: 60 tablet; Refill: 2    40 minutes total time with patient, > 50% with chart review, counseling and coordination of cares, and documentation.     I have had an Advance Directives discussion with the patient.  "        Subjective:    Bailey Malhotra is seen today for follow-up assessment.  Has had progressive difficulties with voice difficulties and swallowing issues.  Progressive bulbar palsy felt probably part of ALS.  Seen by Dr. Phi coffey at [facility] Department of neurology.  He had spent 90 minutes explaining natural history of current concerns.  Noted LFT elevation and wanted these rechecked due to potential LFT elevation while on Nuedexta 20/10 using 1 tablet daily x7 days then every 12 hours x7 days.  Unclear if to continue beyond this.  Was $600 therefore has not filled.  Pain originally before seen in the emergency room epigastric region going through to her back.  Was told to have speech therapy.  Pulmonary function test without need for BiPAP for breathing support.  Patient initially states that she would be interested in no treatment if worsening symptoms noted.  Patient denies history of significant underlying depression however does recognize likely difficulty feeding self in activities of daily living in future.  We discussed feeding tube, assistance with ADLs etc. in future.  Appetite normal.  Told to ensure adequate caloric intake to maintain weight to avoid future weight loss.  Comprehensive review of systems as above otherwise all negative.       2 sons - oldest son (Nghia) with a son with special needs (they both live with her); Rory is  to Audrey   1 daughter -   Tobacco:  1 ppd   EtOH:  infrequent   Mom -  77 \" in sleep\"; possible slight CVA   Dad - uses walker to prevent falls   1 younger bro - he is in a nursing home due to \"bad stroke and wasn't found for 2 days\"   Surgeries:  lap choly   Hospitalizations:  none   Retired -  with [employer]   Hobbies:  reading, cleaning, laundry...    Past Surgical History:   Procedure Laterality Date     TUBAL LIGATION          No family history on file.     Past Medical History:   Diagnosis Date     Cholelithiasis  "     GERD (gastroesophageal reflux disease)         Social History     Tobacco Use     Smoking status: Current Every Day Smoker     Smokeless tobacco: Never Used     Tobacco comment: 10-15 per night/weekend   Substance Use Topics     Alcohol use: Yes     Comment: Occasional     Drug use: No        Current Outpatient Medications   Medication Sig Dispense Refill     aspirin 81 mg chewable tablet Chew 81 mg daily.       betamethasone, augmented, (DIPROLENE) 0.05 % ointment apply topically sparingly to affected areas of palms twice daily as needed 15 g 0     calcium carbonate-vitamin D3 (CALCIUM 600 + D,3,) 600 mg(1,500mg) -400 unit per tablet Take 1 tablet by mouth daily.       diphenhydrAMINE-acetaminophen (TYLENOL PM EXTRA STRENGTH)  mg Tab Take 2 tablets by mouth.       famotidine (PEPCID) 20 MG tablet Take 1 tablet (20 mg total) by mouth 2 (two) times a day. 60 tablet 2     lansoprazole (PREVACID) 30 MG capsule Take 1 capsule (30 mg total) by mouth daily. 30 capsule 2     multivitamin with minerals (THERA-M) 9 mg iron-400 mcg Tab tablet Take 1 tablet by mouth daily.       No current facility-administered medications for this visit.           Objective:    Vitals:    04/07/21 1416   BP: 120/70   Pulse: 84   Temp: 98.4  F (36.9  C)   Weight: 159 lb (72.1 kg)      Body mass index is 27.29 kg/m .    Alert.  Dysarthric speech.  Chest clear.  Cardiac exam regular.  Extremities warm and dry.      EXAM: CT ABDOMEN PELVIS W  LOCATION: THE URGENCY ROOM [place]  DATE/TIME: 3/30/2021 9:22 PM    INDICATION: Epigastric pain.  COMPARISON: None.  TECHNIQUE: CT scan of the abdomen and pelvis was performed following injection of IV contrast. Multiplanar reformats were obtained. Dose reduction techniques were used.  CONTRAST: Iopamidol 300 mg/mL  mL Bottle: 100 mL.    FINDINGS:   LOWER CHEST: Normal.    HEPATOBILIARY: Cholecystectomy. Slight dilatation of the common bile duct compatible with postcholecystectomy  state.    PANCREAS: Normal.    SPLEEN: Normal.    ADRENAL GLANDS: Normal.    KIDNEYS/BLADDER: Normal.    BOWEL: Duodenal wall thickening with surrounding soft tissue infiltration compatible with duodenitis. Large diverticulum arising from the medial aspect of the descending duodenum. Colonic diverticula without CT evidence for diverticulitis. No appendicitis.    LYMPH NODES: Normal.    VASCULATURE: Unremarkable.    PELVIC ORGANS: Large calcified fibroid.    MUSCULOSKELETAL: Normal.    IMPRESSION:   1.  Duodenal wall thickening and surrounding inflammatory change compatible with duodenitis. Large diverticulum arising from the medial aspect of the descending duodenum.    2.  Large calcified uterine fibroid.    3.  Normal appendix.        EXAM: XR ESOPHAGRAM  LOCATION: [facility]  DATE/TIME: 4/5/2021 8:23 AM     INDICATION: Dysphagia with aspiration.  COMPARISON: None.  TECHNIQUE: Routine.     FINDINGS:  FLUOROSCOPIC TIME: .5 minutes.  NUMBER OF IMAGES: 5.     ESOPHAGUS: Mild esophageal stasis with decreased secondary stripping waves. Small amount of gastroesophageal reflux. Otherwise normal esophagram. No stricture, mass lesion, or inflammatory changes.        EXAM: XR SWALLOW STUDY W SPEECH OR OT  LOCATION: [facility]  DATE/TIME: 4/5/2021 8:21 AM     INDICATION: Difficulty swallowing.  COMPARISON: None.     TECHNIQUE: Routine.     FINDINGS:   FLUOROSCOPIC TIME: 0.9 minutes  NUMBER OF IMAGES: 0     Swallow study with Speech Pathology using multiple barium thicknesses.      IMPRESSION:   One episode of minimal transient penetration with thin consistency barium from a straw with consecutive swallows. Otherwise normal swallow. No aspiration with any consistency. See speech pathology report for additional details.        [facility]  HEAD MRI WITHOUT IV CONTRAST  2/24/2021 1:35 PM     INDICATION: Neuro deficit, acute, stroke suspected  TECHNIQUE: Head  MRI without intravenous contrast.  COMPARISON: Head CT dated 08/04/2020.      FINDINGS: No acute infarct/restricted diffusion. No intra-axial mass lesion, acute hemorrhage, or extra-axial fluid collections. Normal ventricles. Minor chronic small vessel ischemic change involving the periventricular white matter. No other proximal   abnormalities identified.      The pituitary gland is unremarkable. The major intracranial vascular flow voids are maintained. The calvarium and skull base are unremarkable.  The orbits are unremarkable. The paranasal sinuses are clear. The mastoid air cells are clear.      IMPRESSION:   CONCLUSION:  1.  Normal Head MRI for age.   2.  No acute infarcts, mass lesions or hemorrhage.        This note has been dictated using voice recognition software and as a result may contain minor grammatical errors and unintended word substitutions.

## 2021-06-16 NOTE — PROGRESS NOTES
"Speech Language/Pathology  Videofluoroscopic Swallow Study     Problem:  Patient Active Problem List   Diagnosis     Hematuria     CXR Lungs Solitary Pulmonary Nodule (___ Cm)     Second degree uterine prolapse     OAB (overactive bladder)     Abnormal CT scan, neck       Evaluation date: 4/5/21  Reason for evaluation: Dysphagia  Pertinent History: Per primary care provider on 3/25/21 patient \"is seen today for progressive concerns with speech difficulties and swallowing difficulties.  Was referred to 8 [facility] and saw Dr. Andre Flanagan and told to complete swallow study with speech therapy as well as esophagram scheduled April 5, 2021.  Seen by Dr. Barriga neurologist and has EMG scheduled April 1, 2021.  No unintentional weight loss.  No fever.  Continues to smoke 1 pack/day.  History of anxiety and had been on sertraline historically however stopped in the last month or 2.  Also discontinued pantoprazole 40 mg daily in case this was causing any issues with speech difficulties etc.  Did have work-up previously including diagnostic imaging including MR head limited without contrast with results showing normal head MRI for age without acute infarct, mass lesions or hemorrhage.  Comprehensive review of systems as above otherwise all negative.\".    Patient's voice is dysarthric during evaluation. She reports occasional coughing, primarily with liquids. She stated that her swallow and speech has improved over the last week or so. She denies recent respiratory infections or cough.      Current Diet: regular and thin  Baseline Diet: regular and thin    Assessment:    Patient demonstrated no oral and mild pharyngeal dysphagia.    Patient is at low aspiration risk with all intake.    Rehab potential is good based on prior level of function and evaluation results.    Patient is a good candidate to complete therapeutic exercises and/or compensatory strategies to improve oral and/or pharyngeal phase of swallow due to cooperation " and evaluation results.    Reviewed history of swallow problem with patient, verbally explained role of SLP and radiologist. Verbally explained process of VFSS prior to administration of barium. Verbally explained results and recommendations to pt. SLP answered questions and stated that a written copy of test will be faxed to pt's doctor.  Patient verbalized understanding.    Recommendations:    Plan:Recommend Regular and thin liquids     Strategies: Upright position and alternate liquids/solids.    Speech therapy is recommended as an OP for further assessment and treatment of dysarthria.    Referrals: Sp/lauren eagle    Subjective:    Patient presents as cooperative and awake during this evaluation.   An  was not applicable    Patient was given puree, honey, nectar, thin and regular solid.    Objective:    Dentition/Oral hygiene: [facility]    Oral motor function was grossly intact.     Bolus prep and oral control was not impaired. Mastication was slow but functional and the patient used rotary chewing.    Anterior-Posterior transit was not impaired.    Premature spill beyond the base of the tongue into the valleculae did not occur with all textures trialed.    Tongue base retraction was not impaired.    Oral stasis occurred with solid on posterior portion of tongue. Patient cleared with dry swallow.    Aspiration did not occur with all textures trialed.     Shallow transient laryngeal penetration occurred x1 with thin via consecutive straw sips.     Swallow response was timely with all textures trialed.      Epiglottic movement was complete consistently across texture trials.    Pharyngeal stasis occurred with solid at the valleculae and cleared with thin rinse.     Pharyngeal constriction was not impaired.    Hyolaryngeal elevation was not impaired. Hyolaryngeal excursion was not impaired.    Cricopharyngeal function was not impaired. Cervical esophageal function was no observed. Please see results of esophagram  for further information.    20 dysphagia minutes    Evy Hebert MA, CCC-SLP

## 2021-06-17 ENCOUNTER — PATIENT OUTREACH (OUTPATIENT)
Dept: CARE COORDINATION | Facility: CLINIC | Age: 66
End: 2021-06-17

## 2021-06-17 ENCOUNTER — THERAPY VISIT (OUTPATIENT)
Dept: SPEECH THERAPY | Facility: CLINIC | Age: 66
End: 2021-06-17
Payer: COMMERCIAL

## 2021-06-17 ENCOUNTER — OFFICE VISIT (OUTPATIENT)
Dept: NEUROLOGY | Facility: CLINIC | Age: 66
End: 2021-06-17
Payer: COMMERCIAL

## 2021-06-17 VITALS
SYSTOLIC BLOOD PRESSURE: 131 MMHG | BODY MASS INDEX: 26.61 KG/M2 | HEART RATE: 80 BPM | DIASTOLIC BLOOD PRESSURE: 77 MMHG | OXYGEN SATURATION: 97 % | WEIGHT: 155 LBS

## 2021-06-17 DIAGNOSIS — G12.21 ALS (AMYOTROPHIC LATERAL SCLEROSIS) (H): ICD-10-CM

## 2021-06-17 DIAGNOSIS — R13.12 OROPHARYNGEAL DYSPHAGIA: ICD-10-CM

## 2021-06-17 DIAGNOSIS — G12.22 BULBAR PALSY (H): Primary | ICD-10-CM

## 2021-06-17 DIAGNOSIS — R47.1 DYSARTHRIA: ICD-10-CM

## 2021-06-17 DIAGNOSIS — G12.21 ALS (AMYOTROPHIC LATERAL SCLEROSIS) (H): Primary | ICD-10-CM

## 2021-06-17 LAB
ALBUMIN SERPL-MCNC: 3.8 G/DL (ref 3.4–5)
ALP SERPL-CCNC: 120 U/L (ref 40–150)
ALT SERPL W P-5'-P-CCNC: 34 U/L (ref 0–50)
AST SERPL W P-5'-P-CCNC: 24 U/L (ref 0–45)
BILIRUB DIRECT SERPL-MCNC: 0.1 MG/DL (ref 0–0.2)
BILIRUB SERPL-MCNC: 0.3 MG/DL (ref 0.2–1.3)
PROT SERPL-MCNC: 8 G/DL (ref 6.8–8.8)

## 2021-06-17 PROCEDURE — 92522 EVALUATE SPEECH PRODUCTION: CPT | Mod: GN | Performed by: SPEECH-LANGUAGE PATHOLOGIST

## 2021-06-17 PROCEDURE — 92610 EVALUATE SWALLOWING FUNCTION: CPT | Mod: GN | Performed by: SPEECH-LANGUAGE PATHOLOGIST

## 2021-06-17 PROCEDURE — 94375 RESPIRATORY FLOW VOLUME LOOP: CPT | Performed by: INTERNAL MEDICINE

## 2021-06-17 PROCEDURE — 92507 TX SP LANG VOICE COMM INDIV: CPT | Mod: GN | Performed by: SPEECH-LANGUAGE PATHOLOGIST

## 2021-06-17 PROCEDURE — 80076 HEPATIC FUNCTION PANEL: CPT | Performed by: PATHOLOGY

## 2021-06-17 PROCEDURE — 92526 ORAL FUNCTION THERAPY: CPT | Mod: GN | Performed by: SPEECH-LANGUAGE PATHOLOGIST

## 2021-06-17 PROCEDURE — 99215 OFFICE O/P EST HI 40 MIN: CPT | Mod: GC | Performed by: PSYCHIATRY & NEUROLOGY

## 2021-06-17 PROCEDURE — 36415 COLL VENOUS BLD VENIPUNCTURE: CPT | Performed by: PATHOLOGY

## 2021-06-17 RX ORDER — DEXTROMETHORPHAN HYDROBROMIDE AND QUINIDINE SULFATE 20; 10 MG/1; MG/1
1 CAPSULE, GELATIN COATED ORAL DAILY
Status: ON HOLD | COMMUNITY
Start: 2021-04-14 | End: 2022-01-01

## 2021-06-17 ASSESSMENT — REVISED AMYOTROPHIC LATERAL SCLEROSIS FUNCTIONAL RATING SCALE (ALSFRS-R)
BULBAR_SUBTOTAL: 8
ALSFRS_TOTAL_SCORE: 44
SPEECH: 2
TURNING_IN_BED_AND_ADJUSTING_BED_CLOTHES: NORMAL
HANDWRITING: NORMAL
WALKING: NORMAL
WALKING: 4
FINE_MOTOR_SUBTOTAL_SCORE: 12
RESPIRATORY_SUBTOTAL_SCORE: 12
DRESSING_AND_HYGEINE: 4
SALIVATION: 3
ORTHOPENA: 4
HANDWRITING: 4
SIX_ITEM_SUBTOTAL: 22
TURNING_IN_BED_AND_ADJUSTING_BED_CLOTHES: 4
SALIVATION: SLIGHT BUT DEFINITE EXCESS OF SALIVA IN MOUTH; MAY HAVE NIGHTTIME DROOLING
DYSPNEA: 4
GROSS_MOTOR_SUBTOTAL_SCORE: 12
SWALLOWING: EARLY EATING PROBLEMS - OCCASIONAL CHOKING
CUTTING_FOOD_AND_HANDLING_UTENSILES: 4
SWALLOWING: 3
RESPIRATORY_INSUFFICIENCY: 4
CLIMBING_STAIRS: 4
DRESSING_AND_HYGEINE: NORMAL FUNCTION
CLIMBING_STAIRS: NORMAL
SPEECH: INTELLIGIBLE WITH REPEATING

## 2021-06-17 NOTE — PROGRESS NOTES
Outpatient Speech Language Pathology Neurology Clinic Evaluation  Bailey Malhotra YOB: 1955 [id number]    Visit Date: 6/17/2021    Age: 66 year old    Medical Diagnosis: Amyotrophic lateral sclerosis (ALS) (Progressive Bulbar Palsy)    Date of Diagnosis: Initial PBP 4/7/21, bulbar onset ALS 6/17/21    Referring MD: Dr Perkins     present: No    PMH: Refer to Medical Chart    Fall Risk:Refer to physician report    Others at Clinic Visit: Daughter-in-law Audrey    Living Situation: With others (son and grandson)    Patient Concerns/Goals: Increased swallowing difficulty, Increased speech deficits, Augmentative / Alternative communication needs    Observations: Patient is pleasant and cooperative although fatigued from long clinic visit.    Current Mode of Nutrition: Oral diet of regular solids and thin liquids    Weight: 155lbs (159lbs at dx 4/7/21)    Cardio-Respiratory Status: Forced vital capacity: 69% on 4/7/21, scheduled after todays appt    Functional Rating Scale (ALS-FRS): 44/48      Oral Motor/Swallowing  Oral Motor Function: Anomalies present:    Mandibular anomaly- none  Labial anomaly- ROM (mild), Strength (moderate), Rate (moderate)  Lingual anomaly- ROM (mild-mod) with very minimal elevation, Strength (moderate-severe), and Rate (moderate-severe)  Velar elevation- reduced, hypernasal quality  Buccal strength- adequate    Volitional Abilities:  Cough- Weak  Throat Clear- Functional  Swallow- Present    Swallow Function:   Thin Liquid-  Moderately reduced bolus prep/control and A-P propulsion, suspected premature spillage, reduced laryngeal elevation, multiple swallows to pass bolus (5-6) with OVERT s/s aspiration.  Nectar Thick Liquid-  Mildly reduced bolus prep/control and A-P propulsion, reduced laryngeal elevation but no overt s/s aspiration and only 1 swallow required per sip.  Regular Solid-  Mild-moderately reduced boolus prep/control and A-P propulsion with reduced  laryngeal elevation but no overt s/s aspiration.    Dysphagia Diagnosis: Moderate dysphagia      Speech Intelligibility/Functional Communication   Methods of communication: Verbal    Dysarthria: Moderate    Speech:   Deficits in phonation- Harsh quality and Strained-strangled quality (spastic)  Deficits in articulation- Decreased precision of articulation and Slow effortful rate  Deficits in resonance- Hypernasal  Intelligibility- 80% to this SLP    Speech Intelligibility/Communication:  Impacts daily activities, Impacts social activities and Impacts phone usage    Augmentative and Alternative Communication (AAC):   Educated as below. Requested Boogie Board and iPad from [facility]      Clinical Impression/Plan of Care  Treatment Diagnosis: Oropharyngeal Dysphagia and Dysarthia     Recommendations:  Oral diet of soft moist solids and nectar thick liquids  Use of safe swallow strategies provided in handout  Use speech strategies provided in handout  Initiate use of Boogie Board and iPad from [facility]    Plan of Care:  1 session evaluation and treatment.    Goals: Based on today's evaluation session patient and/or caregiver will have understanding of current communication and/or swallowing status and recommendations for management.    Educational Assessment:  Learners- Patient and Daughter-in-Law  Barriers to Learning- No barriers.    Education provided/response:   Speech  Swallowing  AAC  Diet  Verbalized understanding    Treatment provided this date:   Swallow intervention, 16 minutes  SLP educated in swallowing anatomy and physiology including aspiration and possible respiratory compromise from aspiration. Trained safe swallow strategies to reduce these risks (slow rate of intake, small bites/sips, chin tuck/neutral head position, pills in purees, mindful swallowing). Also trained diet modifications to reduce risk of aspiration and ease intake including avoid hard dry foods with particles and choosing soft moist solids. SLP  educated in thickened liquids including rationale and possible benefit in reducing aspiration, provided samples.  Communication intervention, 12 minutes  SLP provided education in lingual and labial deficits impact on speech and articulation. SLP trained use of speech strategies to improve intelligibility and reduce fatigue with speaking (reducing background noise, facing the conversation partner, speaking slowly, exaggerating each sound, repeating words or rewording message, using as few words as possible, planning ahead to reduce impact of fatigue) SLP introduced concept of Augmentative-Alternative Communication and demonstrated use of Letter Board, Message Board, Boogie Board and iPad with AAC aniceto (Speech Assistant). She would like to pursue Boogie Board and iPad which were requested by \[facility]\"". SLP provided introductory training in use of iPad aniceto for communication but pt may require more thorough education once device is received.     Response to recommendations/treatment: verbalized understanding, asked appropriate questions, agreed to recommendations    Goal attainment: All goals met    Risks and benefits of evaluation/treatment have been explained.  Patient, family and/or caregiver are in agreement with Plan of Care.    Timed Code Treatment Minutes: 0 minutes of timed codes    Total Treatment Time (sum of timed and untimed services): 45 minutes

## 2021-06-17 NOTE — PROGRESS NOTES
[facility]: [place]  Neuromuscular Progress Note    Patient Name:  Bailey Malhotra  MRN:  [id number]    :  1955  Date of Service:  2021  Primary care provider:  Daniel Fortune    Brief Summary:   Ms. Malhotra is a 66 year old woman with a h/o progressive bulbar palsy who presents for ALS follow-up. Symptom onset of dysphagia in Summer 2020 along with pseudobulbar affect.  Riluzole not started yet given elevated liver function test.  She is not interested in Radicava.     Subjective:   She was last seen in clinic on 21    Cough: She does not have cough assist or mucinex    Breathing: No SOB at rest or with exertion. No orthopnea  Sleep: Sleeping well, no morning headaches or daytime sleepiness  Dysarthria: Present, feels it is slowly worsening and family members are noting it is more difficult to understand her. Speech mostly intelligible  Dysphagia: present, mostly with pills and liquids. Coughing on liquids about 1x per day  Sialorrhea: present but mild. Noting thicker secretions  Weight: Lost about 4lbs over the last 2 months   Mobility: No difficulty  Spasticity: None  Pain/Cramps: None  ADLs: No difficulty  Depression: None  Anxiety: None  Pseudobulbar: present, on sertraline and has been helpful  Cognitive: No concerns  Life Planning: No ACP on file  Caregiver: Lives with son                                                 ROS: A 10-point ROS was performed as per HPI.    PMH:  No past medical history on file.  No past surgical history on file.    Medications:      Current Outpatient Medications:      aspirin (ASA) 81 MG chewable tablet, Take 81 mg by mouth daily, Disp: , Rfl:      calcium carbonate 600 mg-vitamin D 400 units (CALTRATE) 600-400 MG-UNIT per tablet, Take 1 tablet by mouth daily, Disp: , Rfl:      diphenhydrAMINE-acetaminophen (TYLENOL PM)  MG tablet, Take 2 tablets by mouth daily, Disp: , Rfl:      famotidine (PEPCID) 20 MG tablet, , Disp: ,  Rfl:      LANsoprazole (PREVACID) 30 MG DR capsule, , Disp: , Rfl:      multivitamin, therapeutic with minerals (THERA-VIT-M) TABS tablet, Take 1 tablet by mouth daily, Disp: , Rfl:      NUEDEXTA 20-10 MG capsule, Take 1 capsule by mouth See Admin Instructions, Disp: , Rfl:      sertraline (ZOLOFT) 50 MG tablet, Take 0.5 tablets (25 mg) by mouth daily for 7 days, THEN 1 tablet (50 mg) daily for 7 days, THEN 1.5 tablets (75 mg) daily., Disp: 60 tablet, Rfl: 1    Physical Examination:   /77   Pulse 80   Wt 70.3 kg (155 lb)   LMP  (LMP Unknown)   SpO2 97%   BMI 26.61 kg/m    Wt Readings from Last 4 Encounters:   06/17/21 70.3 kg (155 lb)   04/07/21 72.1 kg (159 lb)   04/07/21 72.1 kg (159 lb)   04/01/21 72.8 kg (160 lb 8 oz)     General: pt sitting comfortably in chair, breathing easily on ra, in NAD   -MS: alert, answering questions appropriately    NM Exam: Cranial     Atrophy Fasciculations   Tongue: Positive Positive      Facial weakness: moderate  Tongue movements: slow  Tongue weakness: moderate  Jaw jerk: present  Speech: dysarthria    NM Exam: Axial    Neck flexion weakness: mild  Neck extension weakness: none    NM Exam: Upper Extremities     Right Left   Atrophy: Negative Negative   Fasciculations: Negative Negative   Muscle tone: normal normal   Rapid alt. movements:     Reflexes Right Left   Biceps: increased increased   Brachioradialis: increased increased   Triceps: increased increased   Braga: right Braga reflex present left Braga reflex present   Strength Right Left   * Indicates a recommended field     Shoulder abduction*: 5 5   Elbow flexion: 5 5   Elbow extension*: 5 5   Wrist extension*: 5 5   Finger extension: 4+ 5   Finger flexion:     Abductor pollicis brevis: 5- 5   First dorsal interosseous*: 5- 5   Abductor digiti minimi:         NM Exam: Lower Extremities     Right Left   Atrophy: Negative Negative   Fasciculations: Negative Negative   Muscle tone: normal normal   Rapid alt.  movements:     Reflexes Right Left   Patellar: increased increased   Achilles: normal normal   Plantar:     Strength Right Left   * Indicates a recommended field     Hip flexion*: 5 5   Knee extension*: 5 5   Knee flexion: 5 5   Ankle dorsiflexion*: 5 5   Ankle plantar flexion: 5 5   Normal gait: yes     Investigations:    Last PFTs (4/7/2021): FVC 69%, MEP 36, MIP -35    Impression:  Ms. Malhotra is a 66 year old woman with a h/o progressive bulbar palsy who presents for ALS follow-up.     1) Progressive bulbar palsy  Symptom onset of dysphagia in Summer 2020 along with pseudobulbar affect.  Overall symptoms are mostly isolated to the bulbar region with dysarthria and dysphagia.  On examination she has both upper and lower motor neuron findings with fasciculations in the bulbar region.  We discussed with her the diagnosis of progressive bulbar palsy today along with the natural history of the disease.  She has not started riluzole yet.  Her liver function tests were abnormal in the past and will plan to repeat those today.  If normal we will go ahead and start riluzole.  She is not interested in Radicava.  Given her dysarthria and dysphagia she will also meet with our speech therapist to assess swallow function along with discussing communication devices.  We also reviewed indications for gastrostomy.  She would benefit from meeting with our dietitian but she would like to hold off for now.  For secretions we recommend starting Mucinex and cough assist as needed.  She is interested in clinical trials and will meet with our research coordinator today.    Recommendations:   -Mucinex and cough assist prn   -LFTs today  -Start riluzole if liver tests are normal  -SLP/SW  -PFTs today  -Meet with research coordinator  -RTC 3 mo    Patient seen and discussed with attending neurologist, Dr. Perkins, who agrees with above.    Andre Brunson MD  Neuromuscular Fellow    I personally examined the patient and concur with the  resident's note. Despite a normal EMG, Ms Malhotra does have convincing tongue fasciculations at rest and tongue atrophy. There is mild distal RUE weakness as well. I explained that the findings in the tongue are indicative of LMN involvement and that her progressive bulbar palsy is expected to ultimately involve the limbs; in fact, as there is mild weakness with hyperreflexia in the right upper limb, she fulfills [place] clinical criteria for probable ALS at this time.    Phi Perkins M.D.    40 minutes spent on the date of the encounter on chart review, history and examination, documentation and further activities as noted above.

## 2021-06-17 NOTE — PATIENT INSTRUCTIONS
Recommendations:   -Mucinex and cough assist as needed    -Pulmonary function test today  -Labs today  -Start riluzole if liver function is normal  -Speech therapy and social work today  -Meet with research coordinator today  -Follow-up in 3 months    Please call [facility] @ [phone] for questions or concerns during regular business hours. For a more efficient way to communicate, use [facility] [place] - 2 and address the message to your physician. Remember, MyChart is only read during business hours. Do not leave urgent messages on [facility] or [facility] [place] - [facility]. If situation is urgent, contact the Neurology Clinic @ [phone] and ask to speak to a Triage Nurse or Call 911 or visit an Emergency Department.    Please call your pharmacy if you need a medication refill. They will send us an electronic message.

## 2021-06-17 NOTE — DISCHARGE INSTRUCTIONS
"Swallowing  1. Purchase thickening powder, thicken all liquids to \"nectar\"  2. Choose soft moist solids  3. Small bites/sips, slow rate, tuck chin tuck when swallowing  Speech  1. Use strategies listed on handout  2. [facility] will send you a Boogie Board and iPad with \"Speech Assistant AAC\" aniceto (see handout for instruction on using it)  "

## 2021-06-17 NOTE — LETTER
2021       RE: Bailey Malhotra  [address] [place] [address] [address]     Dear Colleague,    Thank you for referring your patient, Bailey Malhotra, to the [facility] [place] at [facility]. Please see a copy of my visit note below.    [facility]: [place]  Neuromuscular Progress Note    Patient Name:  Bailey Malhotra  MRN:  [id number]    :  1955  Date of Service:  2021  Primary care provider:  Daniel Fortune    Brief Summary:   Ms. Malhotra is a 66 year old woman with a h/o progressive bulbar palsy who presents for ALS follow-up. Symptom onset of dysphagia in Summer 2020 along with pseudobulbar affect.  Riluzole not started yet given elevated liver function test.  She is not interested in Radicava.     Subjective:   She was last seen in clinic on 21    Cough: She does not have cough assist or mucinex    Breathing: No SOB at rest or with exertion. No orthopnea  Sleep: Sleeping well, no morning headaches or daytime sleepiness  Dysarthria: Present, feels it is slowly worsening and family members are noting it is more difficult to understand her. Speech mostly intelligible  Dysphagia: present, mostly with pills and liquids. Coughing on liquids about 1x per day  Sialorrhea: present but mild. Noting thicker secretions  Weight: Lost about 4lbs over the last 2 months   Mobility: No difficulty  Spasticity: None  Pain/Cramps: None  ADLs: No difficulty  Depression: None  Anxiety: None  Pseudobulbar: present, on sertraline and has been helpful  Cognitive: No concerns  Life Planning: No ACP on file  Caregiver: Lives with son                                                 ROS: A 10-point ROS was performed as per HPI.    PMH:  No past medical history on file.  No past surgical history on file.    Medications:      Current Outpatient Medications:      aspirin (ASA) 81 MG chewable tablet, Take 81 mg by mouth  daily, Disp: , Rfl:      calcium carbonate 600 mg-vitamin D 400 units (CALTRATE) 600-400 MG-UNIT per tablet, Take 1 tablet by mouth daily, Disp: , Rfl:      diphenhydrAMINE-acetaminophen (TYLENOL PM)  MG tablet, Take 2 tablets by mouth daily, Disp: , Rfl:      famotidine (PEPCID) 20 MG tablet, , Disp: , Rfl:      LANsoprazole (PREVACID) 30 MG DR capsule, , Disp: , Rfl:      multivitamin, therapeutic with minerals (THERA-VIT-M) TABS tablet, Take 1 tablet by mouth daily, Disp: , Rfl:      NUEDEXTA 20-10 MG capsule, Take 1 capsule by mouth See Admin Instructions, Disp: , Rfl:      sertraline (ZOLOFT) 50 MG tablet, Take 0.5 tablets (25 mg) by mouth daily for 7 days, THEN 1 tablet (50 mg) daily for 7 days, THEN 1.5 tablets (75 mg) daily., Disp: 60 tablet, Rfl: 1    Physical Examination:   /77   Pulse 80   Wt 70.3 kg (155 lb)   LMP  (LMP Unknown)   SpO2 97%   BMI 26.61 kg/m    Wt Readings from Last 4 Encounters:   06/17/21 70.3 kg (155 lb)   04/07/21 72.1 kg (159 lb)   04/07/21 72.1 kg (159 lb)   04/01/21 72.8 kg (160 lb 8 oz)     General: pt sitting comfortably in chair, breathing easily on ra, in NAD   -MS: alert, answering questions appropriately    NM Exam: Cranial     Atrophy Fasciculations   Tongue: Positive Positive      Facial weakness: moderate  Tongue movements: slow  Tongue weakness: moderate  Jaw jerk: present  Speech: dysarthria    NM Exam: Axial    Neck flexion weakness: mild  Neck extension weakness: none    NM Exam: Upper Extremities     Right Left   Atrophy: Negative Negative   Fasciculations: Negative Negative   Muscle tone: normal normal   Rapid alt. movements:     Reflexes Right Left   Biceps: increased increased   Brachioradialis: increased increased   Triceps: increased increased   Braga: right Braga reflex present left Braga reflex present   Strength Right Left   * Indicates a recommended field     Shoulder abduction*: 5 5   Elbow flexion: 5 5   Elbow extension*: 5 5   Wrist  extension*: 5 5   Finger extension: 4+ 5   Finger flexion:     Abductor pollicis brevis: 5- 5   First dorsal interosseous*: 5- 5   Abductor digiti minimi:         NM Exam: Lower Extremities     Right Left   Atrophy: Negative Negative   Fasciculations: Negative Negative   Muscle tone: normal normal   Rapid alt. movements:     Reflexes Right Left   Patellar: increased increased   Achilles: normal normal   Plantar:     Strength Right Left   * Indicates a recommended field     Hip flexion*: 5 5   Knee extension*: 5 5   Knee flexion: 5 5   Ankle dorsiflexion*: 5 5   Ankle plantar flexion: 5 5   Normal gait: yes     Investigations:    Last PFTs (4/7/2021): FVC 69%, MEP 36, MIP -35    Impression:  Ms. Malhotra is a 66 year old woman with a h/o progressive bulbar palsy who presents for ALS follow-up.     1) Progressive bulbar palsy  Symptom onset of dysphagia in Summer 2020 along with pseudobulbar affect.  Overall symptoms are mostly isolated to the bulbar region with dysarthria and dysphagia.  On examination she has both upper and lower motor neuron findings with fasciculations in the bulbar region.  We discussed with her the diagnosis of progressive bulbar palsy today along with the natural history of the disease.  She has not started riluzole yet.  Her liver function tests were abnormal in the past and will plan to repeat those today.  If normal we will go ahead and start riluzole.  She is not interested in Radicava.  Given her dysarthria and dysphagia she will also meet with our speech therapist to assess swallow function along with discussing communication devices.  We also reviewed indications for gastrostomy.  She would benefit from meeting with our dietitian but she would like to hold off for now.  For secretions we recommend starting Mucinex and cough assist as needed.  She is interested in clinical trials and will meet with our research coordinator today.    Recommendations:   -Mucinex and cough assist prn   -LFTs  today  -Start riluzole if liver tests are normal  -SLP/SW  -PFTs today  -Meet with research coordinator  -RTC 3 mo    Patient seen and discussed with attending neurologist, Dr. Perkins, who agrees with above.    Andre Brunson MD  Neuromuscular Fellow    I personally examined the patient and concur with the resident's note. Despite a normal EMG, Ms Malhotra does have convincing tongue fasciculations at rest and tongue atrophy. There is mild distal RUE weakness as well. I explained that the findings in the tongue are indicative of LMN involvement and that her progressive bulbar palsy is expected to ultimately involve the limbs; in fact, as there is mild weakness with hyperreflexia in the right upper limb, she fulfills [place] clinical criteria for probable ALS at this time.    Phi Perkins M.D.    40 minutes spent on the date of the encounter on chart review, history and examination, documentation and further activities as noted above.      Again, thank you for allowing me to participate in the care of your patient.      Sincerely,    Phi Perkins MD

## 2021-06-18 DIAGNOSIS — G12.21 ALS (AMYOTROPHIC LATERAL SCLEROSIS) (H): Primary | ICD-10-CM

## 2021-06-18 RX ORDER — RILUZOLE 50 MG/1
50 TABLET, FILM COATED ORAL EVERY 12 HOURS
Qty: 60 TABLET | Refills: 3 | Status: SHIPPED | OUTPATIENT
Start: 2021-06-18 | End: 2022-01-01

## 2021-06-18 NOTE — PATIENT INSTRUCTIONS - HE
Patient Instructions by Rk English MD at 8/20/2020 10:40 AM     Author: Rk English MD Service: -- Author Type: Physician    Filed: 8/20/2020 10:42 AM Encounter Date: 8/20/2020 Status: Signed    : Rk English MD (Physician)       Patient Education     GERD (Adult)    The esophagus is a tube that carries food from the mouth to the stomach. A valve at the lower end of the esophagus prevents stomach acid from flowing upward. When this valve doesn't work properly, stomach contents may repeatedly flow back up (reflux) into the esophagus. This is called gastroesophageal reflux disease (GERD). GERD can irritate the esophagus. It can cause problems with swallowing or breathing. In severe cases, GERD can cause recurrent pneumonia or other serious problems.  Symptoms of reflux include burning, pressure or sharp pain in the upper abdomen or mid to lower chest. The pain can spread to the neck, back, or shoulder. There may be belching, an acid taste in the back of the throat, chronic cough, or sore throat or hoarseness. GERD symptoms often occur during the day after a big meal. They can also occur at night when lying down.   Home care  Lifestyle changes can help reduce symptoms. If needed, medicines may be prescribed. Symptoms often improve with treatment, but if treatment is stopped, the symptoms often return after a few months. So most persons with GERD will need to continue treatment.  Lifestyle changes    Limit or avoid fatty, fried, and spicy foods, as well as coffee, chocolate, mint, and foods with high acid content such as tomatoes and citrus fruit and juices (orange, grapefruit, lemon).    Dont eat large meals, especially at night. Frequent, smaller meals are best. Do not lie down right after eating. And dont eat anything 3 hours before going to bed.    Avoid drinking alcohol and smoking. As much as possible, stay away from second hand smoke.    If you are overweight, losing weight will reduce  "symptoms.     Avoid wearing tight clothing around your stomach area.    If your symptoms occur during sleep, use a foam wedge to elevate your upper body (not just your head.) Or, place 4\" blocks under the head of your bed.  Medicines  If needed, medicines can help relieve the symptoms of GERD and prevent damage to the esophagus. Discuss a medicine plan with your healthcare provider. This may include one or more of the following medicines:    Antacids to help neutralize the normal acids in your stomach.    Acid blockers (H2 blockers) to decrease acid production.    Acid inhibitors (PPIs) to decrease acid production in a different way than the blockers. They may work better, but can take a little longer to take effect.  Take an antacid 30-60 minutes after eating and at bedtime, but not at the same time as an acid blocker.  Try not to take medicines such as ibuprofen and aspirin. If you are taking aspirin for your heart or other medical reasons, talk to your healthcare provider about stopping it.  Follow-up care  Follow up with your healthcare provider or as advised by our staff.  When to seek medical advice  Call your healthcare provider if any of the following occur:    Stomach pain gets worse or moves to the lower right abdomen (appendix area)    Chest pain appears or gets worse, or spreads to the back, neck, shoulder, or arm    Frequent vomiting (cant keep down liquids)    Blood in the stool or vomit (red or black in color)    Feeling weak or dizzy    Fever of 100.4 F (38 C) or higher, or as directed by your healthcare provider  Date Last Reviewed: 6/23/2015 2000-2017 [employer]. [address] [place] [address], [place] [address]. All rights reserved. This information is not intended as a substitute for professional medical care. Always follow your healthcare professional's instructions.    Acid Suppression Treatment    Start protonix 40 mg in morning as directed.  If you wish, you can also add pepid at " bedtime if you have having symptoms at night  If symptoms have not resolved, then please follow up with Dr. Delroy Hernandez, at the [facility]  I will see you back in 3 months time.     Lifestyle changes:    Avoid eating 2-3 hours before bedtime.   You may find it helpful to elevate the head of your bed.     Avoid following foods that are likely to trigger acid reflux:    Coffee or tea   Anything thats fizzy or has caffeine in it  Alcohol   Citrus fruits, such as oranges and nettie  Tomato based foods (salsa, pizza, lasanga)  Chocolate   Mint or peppermint  Fatty foods (ice cream)  Spicy foods  Onions and garlic

## 2021-06-18 NOTE — PROGRESS NOTES
Social Work -ALS Clinic Progress Note  [facility]    Data/Intervention:    Patient Name:  Bailey Malhotra  /Age:  1955 (66 year old)    Visit Type: in person    Collaborated With:    -Bailey and dtr in law Ventura Marya  -Dr Perkins and members of the ALS interdisciplinary team    Psychosocial info/Concerns:   Pt with new dx in April of Progressive Bulbar Palsy which usually develops into ALS. Met with Pt and her dtr in law Ventura in clinic to complete a psychosocial assessment and provide resource information.   Living with her is her son Nghia and his son Hernan (20) with special needs. He has a feeding tube and is total care. Nghia is his full time caregiver/PCA and Bailey helps with his care as well. Nghia's dtr Lexie (22) was living with them but now lives with her SO. In addition to Nghia, Pt has a dtr Radha and son Rory/spouse Audrey who is with Pt today. She feels fully supported by her family and they are committed to caring for her at home.  She is financially stable. She just retired last year and receives Social Security and a pension. Her house is paid for. It's a rambler with laundry in the basement.   They have questions today for the Speech therapist to help her with communication. She is having more difficulty with intelligibility of her speech.  She is connected with [facility]  She denies previous mental health issues. Audrey indicated she she is a worrier. Pt reports she prays a lot but isn't Mandaeism. She is a home-body per Audrey.    Intervention/Education/Resources Provided:  Reviewed the verbal ELGIN and she will complete in clinic today.  Discussed the health care directive and it's purpose and she will complete at home. She indicated that she doesn't want to prolong life when she is not able to take care of herself.   She is Independent with Mobility and ADLS.    Assessment/Plan:  Pt has a good support system and stable finances. She is aware I can assist with the health care  directive if needed. Will continue to follow in ALS clinic.     Provided patient/family with contact information and encouraged them to contact me between clinic visits as needed.     NEW Santana, [facility]    [facility]  [phone]

## 2021-06-20 LAB
EXPTIME-PRE: 10.74 SEC
FEF2575-%PRED-PRE: 35 %
FEF2575-PRE: 0.74 L/SEC
FEF2575-PRED: 2.09 L/SEC
FEFMAX-%PRED-PRE: 21 %
FEFMAX-PRE: 1.32 L/SEC
FEFMAX-PRED: 6.07 L/SEC
FEV1-%PRED-PRE: 44 %
FEV1-PRE: 1.07 L
FEV1FEV6-PRE: 53 %
FEV1FEV6-PRED: 80 %
FEV1FVC-PRE: 53 %
FEV1FVC-PRED: 79 %
FIFMAX-PRE: 1.59 L/SEC
FVC-%PRED-PRE: 66 %
FVC-PRE: 2.02 L
FVC-PRED: 3.06 L
MEP-PRE: 29 CMH2O
MIP-PRE: -42 CMH2O

## 2021-06-21 NOTE — LETTER
Letter by Daniel Fortune MD at      Author: Daniel Fortune MD Service: -- Author Type: --    Filed:  Encounter Date: 2/22/2021 Status: (Other)         Bailey Malhotra  [address] [place] [id number]             February 22, 2021         Dear Ms. Malhotra,    Below are the results from your recent visit:    Resulted Orders   Thyroid Stimulating Hormone (TSH)   Result Value Ref Range    TSH 1.37 0.30 - 5.00 uIU/mL   Comprehensive Metabolic Panel   Result Value Ref Range    Sodium 142 136 - 145 mmol/L    Potassium 5.2 (H) 3.5 - 5.0 mmol/L    Chloride 104 98 - 107 mmol/L    CO2 28 22 - 31 mmol/L    Anion Gap, Calculation 10 5 - 18 mmol/L    Glucose 99 70 - 125 mg/dL    BUN 15 8 - 22 mg/dL    Creatinine 0.77 0.60 - 1.10 mg/dL    GFR MDRD Af Amer >60 >60 mL/min/1.73m2    GFR MDRD Non Af Amer >60 >60 mL/min/1.73m2    Bilirubin, Total 0.3 0.0 - 1.0 mg/dL    Calcium 9.7 8.5 - 10.5 mg/dL    Protein, Total 7.4 6.0 - 8.0 g/dL    Albumin 4.1 3.5 - 5.0 g/dL    Alkaline Phosphatase 156 (H) 45 - 120 U/L    AST 32 0 - 40 U/L    ALT 60 (H) 0 - 45 U/L    Narrative    Fasting Glucose reference range is 70-99 mg/dL per  American Diabetes Association (ADA) guidelines.   HM2(CBC w/o Differential)   Result Value Ref Range    WBC 5.7 4.0 - 11.0 thou/uL    RBC 4.02 3.80 - 5.40 mill/uL    Hemoglobin 13.1 12.0 - 16.0 g/dL    Hematocrit 39.9 35.0 - 47.0 %    MCV 99 80 - 100 fL    MCH 32.6 27.0 - 34.0 pg    MCHC 32.8 32.0 - 36.0 g/dL    RDW 13.9 11.0 - 14.5 %    Platelets 259 140 - 440 thou/uL    MPV 10.5 (H) 7.0 - 10.0 fL   Vitamin B12   Result Value Ref Range    Vitamin B-12 564 213 - 816 pg/mL   Erythrocyte Sedimentation Rate   Result Value Ref Range    Sed Rate 20 0 - 20 mm/hr   C-Reactive Protein   Result Value Ref Range    CRP 0.1 0.0 - 0.8 mg/dL       Your thyroid screening test (i.e. TSH) was normal.     Your kidney and liver tests were normal.     Your complete blood count results were normal.  No evidence for anemia, etc.  "    Your vitamin B12 level was normal.     Your \"inflammation test\" (i.e. Sed Rate) was normal.      Your \"inflammation test\" (i.e. CRP) was normal.     Please follow-up as discussed in order to determine cause of \"slurred speech\" (dysarthria).    Please call with questions or contact us using [email or web address].    Sincerely,        Electronically signed by Daniel Fortune MD       "

## 2021-06-24 DIAGNOSIS — G12.21 ALS (AMYOTROPHIC LATERAL SCLEROSIS) (H): Primary | ICD-10-CM

## 2021-06-24 RX ORDER — RILUZOLE 50 MG/1
50 TABLET, FILM COATED ORAL EVERY 12 HOURS
Qty: 60 TABLET | Refills: 3 | Status: SHIPPED | OUTPATIENT
Start: 2021-06-24 | End: 2021-01-01

## 2021-06-26 NOTE — PROGRESS NOTES
Assessment/Plan:    1. Duodenitis  Duodenitis as noted on prior CT scan following ER evaluation 2021 currently completing 90 days of lansoprazole 30 mg daily and famotidine 20 mg twice daily.  Will schedule upper endoscopy in early July to ensure resolved duodenitis as well as further diagnostic testing or biopsy is indicated.  - Ambulatory referral for Upper GI Endoscopy    2. Progressive bulbar palsy (H)  Progressive bulbar palsy and does have scheduled follow-up the [facility] 2021.  Specialist had canceled Nuedexta prescription due to cost and will reassess for further treatment options at scheduled follow-up 2021.    3. Macrocytosis  Prior macrocytosis with recent folic acid level and prior B12 levels normal with MCV improving to less than 100.  Infrequent alcohol consumption.  We will continue to monitor.          Subjective:    Bailey Malhotra is seen today for follow-up assessment.  Patient describes ulcer.  Chart review suggest duodenal wall thickening on prior CT scan through ER evaluation 2021 consistent with duodenitis and told to use 90 days of lansoprazole 30 mg daily plus famotidine 20 mg twice daily.  No GI distress.  No epigastric concerns.  No bloating.  No early satiety.  No unintentional weight loss.  Bowel movements appear normal.  No back pain.  Patient does have progressive bulbar palsy and patient's daughter-in-law Audrey does feel that her speech has worsened.  Does have generally a softer diet including bananas, yogurt etc.  Has not been having choking episodes or noted aspiration.  Macrocytosis previously had improved with lab testing appearing nondiagnostic for etiology.  Continue to monitor.  Comprehensive review of systems as above otherwise all negative.      2 sons - oldest son (Nghia) with a son with special needs (they both live with her); Rory is  to Audrey  1 daughter -   Tobacco: 1 ppd  EtOH: infrequent  Mom -   "77 \" in sleep\"; possible slight CVA  Dad - uses walker to prevent falls  1 younger bro - he is in a nursing home due to \"bad stroke and wasn't found for 2 days\"  Surgeries: lap choly  Hospitalizations: none  Retired -  with [employer]  Hobbies: reading, cleaning, laundry...    Past Surgical History:   Procedure Laterality Date     TUBAL LIGATION          No family history on file.     Past Medical History:   Diagnosis Date     Cholelithiasis      GERD (gastroesophageal reflux disease)         Social History     Tobacco Use     Smoking status: Current Every Day Smoker     Smokeless tobacco: Never Used     Tobacco comment: 10-15 per night/weekend   Substance Use Topics     Alcohol use: Yes     Comment: Occasional     Drug use: No        Current Outpatient Medications   Medication Sig Dispense Refill     aspirin 81 mg chewable tablet Chew 81 mg daily.       betamethasone, augmented, (DIPROLENE) 0.05 % ointment apply topically sparingly to affected areas of palms twice daily as needed 15 g 0     calcium carbonate-vitamin D3 (CALCIUM 600 + D,3,) 600 mg(1,500mg) -400 unit per tablet Take 1 tablet by mouth daily.       diphenhydrAMINE-acetaminophen (TYLENOL PM EXTRA STRENGTH)  mg Tab Take 2 tablets by mouth.       famotidine (PEPCID) 20 MG tablet Take 1 tablet (20 mg total) by mouth 2 (two) times a day. 60 tablet 2     lansoprazole (PREVACID) 30 MG capsule Take 1 capsule (30 mg total) by mouth daily. 30 capsule 2     multivitamin with minerals (THERA-M) 9 mg iron-400 mcg Tab tablet Take 1 tablet by mouth daily.       No current facility-administered medications for this visit.           Objective:    Vitals:    21 1249   BP: 120/70   Pulse: 80   SpO2: 97%   Weight: 155 lb (70.3 kg)      Body mass index is 26.61 kg/m .    Alert.  Dysarthric speech.  Relatively stable and I am able to communicate and continue to understand majority of patients conversation.  Chest clear.  Cardiac exam regular.  " Extremities warm and dry.      This note has been dictated using voice recognition software and as a result may contain minor grammatical errors and unintended word substitutions.

## 2021-06-29 NOTE — PROGRESS NOTES
Progress Notes by Danna Burns MD at 2020 10:11 AM     Author: Danna Burns MD Service: -- Author Type: Physician    Filed: 2020 12:37 PM Encounter Date: 2020 Status: Signed    : Danna Burns MD (Physician)       Maddy Frank Heather A, MD               Peer to Peer Request     Patient Name: Bailey Malhotra   : 1955   MRN: [id number]     Dr. Burns,     The authorization for procedure CT HEAD WO CONTRAST on date of service 2020 is pending. We were unsuccessful in obtaining approval through clinical review. A [id number] review can be done by calling the insurance/third party authorization vendor with the following information:     Insurance: [facility]   Auth Vendor: [facility] Claire   Phone: [phone] opt. 3   Due: 2020     Patient ID: [id number]   Case/Ref #: [id number]     Please complete this as soon as you are able and let me know when it is done.     Thank you,     Maddy MEDELLIN   [employer] [facility]      Received message as above. Peer to peer review is scheduled for 12:30 PM today with Devaughn Louis.    Peer to peer review done. Imaging for both brain and neck approved.     CT brain: [id number]  CT soft tissue neck: [id number]    Both good until September.

## 2021-07-03 DIAGNOSIS — F48.2 PBA (PSEUDOBULBAR AFFECT): ICD-10-CM

## 2021-07-03 NOTE — ADDENDUM NOTE
Addendum Note by Brenna Sunshine at 3/9/2020 11:00 AM     Author: Brenna Sunshine Service: -- Author Type:     Filed: 3/9/2020  3:24 PM Encounter Date: 3/9/2020 Status: Signed    : Brenna Sunshine ()    Addended by: BRENNA SUNSHINE on: 3/9/2020 03:24 PM        Modules accepted: Orders

## 2021-07-06 VITALS
HEART RATE: 80 BPM | WEIGHT: 155 LBS | BODY MASS INDEX: 26.61 KG/M2 | DIASTOLIC BLOOD PRESSURE: 70 MMHG | SYSTOLIC BLOOD PRESSURE: 120 MMHG | OXYGEN SATURATION: 97 %

## 2021-07-12 ENCOUNTER — TELEPHONE (OUTPATIENT)
Dept: NEUROLOGY | Facility: CLINIC | Age: 66
End: 2021-07-12

## 2021-07-12 NOTE — TELEPHONE ENCOUNTER
Radicava infusions scheduled at [facility]. Ins has denied approval and it is under appeal. Pt will need H&P and Chest Port placed once approved. Spoke with daughter Audrey to update.

## 2021-07-14 DIAGNOSIS — G12.21 ALS (AMYOTROPHIC LATERAL SCLEROSIS) (H): Primary | ICD-10-CM

## 2021-07-25 ENCOUNTER — MYC MEDICAL ADVICE (OUTPATIENT)
Dept: FAMILY MEDICINE | Facility: CLINIC | Age: 66
End: 2021-07-25

## 2021-08-15 ENCOUNTER — HEALTH MAINTENANCE LETTER (OUTPATIENT)
Age: 66
End: 2021-08-15

## 2021-09-21 DIAGNOSIS — G12.21 ALS (AMYOTROPHIC LATERAL SCLEROSIS) (H): Primary | ICD-10-CM

## 2021-09-23 ENCOUNTER — THERAPY VISIT (OUTPATIENT)
Dept: SPEECH THERAPY | Facility: CLINIC | Age: 66
End: 2021-09-23
Payer: COMMERCIAL

## 2021-09-23 ENCOUNTER — OFFICE VISIT (OUTPATIENT)
Dept: NEUROLOGY | Facility: CLINIC | Age: 66
End: 2021-09-23
Payer: COMMERCIAL

## 2021-09-23 ENCOUNTER — LAB (OUTPATIENT)
Dept: LAB | Facility: CLINIC | Age: 66
End: 2021-09-23
Payer: COMMERCIAL

## 2021-09-23 VITALS
BODY MASS INDEX: 25.82 KG/M2 | WEIGHT: 150.4 LBS | SYSTOLIC BLOOD PRESSURE: 150 MMHG | OXYGEN SATURATION: 96 % | RESPIRATION RATE: 16 BRPM | DIASTOLIC BLOOD PRESSURE: 82 MMHG | HEART RATE: 82 BPM

## 2021-09-23 DIAGNOSIS — G12.21 ALS (AMYOTROPHIC LATERAL SCLEROSIS) (H): ICD-10-CM

## 2021-09-23 DIAGNOSIS — G12.22 BULBAR PALSY (H): ICD-10-CM

## 2021-09-23 DIAGNOSIS — G12.21 ALS (AMYOTROPHIC LATERAL SCLEROSIS) (H): Primary | ICD-10-CM

## 2021-09-23 DIAGNOSIS — R13.12 OROPHARYNGEAL DYSPHAGIA: ICD-10-CM

## 2021-09-23 DIAGNOSIS — E83.00 DISORDER OF COPPER METABOLISM, UNSPECIFIED (H): ICD-10-CM

## 2021-09-23 DIAGNOSIS — F48.2 PBA (PSEUDOBULBAR AFFECT): ICD-10-CM

## 2021-09-23 DIAGNOSIS — R47.1 DYSARTHRIA: ICD-10-CM

## 2021-09-23 DIAGNOSIS — R29.2 HYPERREFLEXIA: ICD-10-CM

## 2021-09-23 LAB
ALBUMIN SERPL-MCNC: 3.8 G/DL (ref 3.4–5)
ALP SERPL-CCNC: 163 U/L (ref 40–150)
ALT SERPL W P-5'-P-CCNC: 52 U/L (ref 0–50)
AST SERPL W P-5'-P-CCNC: 38 U/L (ref 0–45)
BILIRUB DIRECT SERPL-MCNC: 0.2 MG/DL (ref 0–0.2)
BILIRUB SERPL-MCNC: 0.6 MG/DL (ref 0.2–1.3)
CA-I BLD-MCNC: 4.8 MG/DL (ref 4.4–5.2)
CK SERPL-CCNC: 41 U/L (ref 30–225)
EXPTIME-PRE: 7.01 SEC
FEF2575-%PRED-PRE: 32 %
FEF2575-PRE: 0.68 L/SEC
FEF2575-PRED: 2.09 L/SEC
FEFMAX-%PRED-PRE: 16 %
FEFMAX-PRE: 1.01 L/SEC
FEFMAX-PRED: 6.07 L/SEC
FEV1-%PRED-PRE: 32 %
FEV1-PRE: 0.78 L
FEV1FEV6-PRE: 41 %
FEV1FEV6-PRED: 80 %
FEV1FVC-PRE: 41 %
FEV1FVC-PRED: 79 %
FIFMAX-PRE: 1.22 L/SEC
FVC-%PRED-PRE: 62 %
FVC-PRE: 1.9 L
FVC-PRED: 3.06 L
MEP-PRE: 34 CMH2O
MIP-PRE: -33 CMH2O
PROT SERPL-MCNC: 8.2 G/DL (ref 6.8–8.8)
VIT B12 SERPL-MCNC: 1316 PG/ML (ref 193–986)

## 2021-09-23 PROCEDURE — 83519 RIA NONANTIBODY: CPT | Mod: 90 | Performed by: PATHOLOGY

## 2021-09-23 PROCEDURE — 36415 COLL VENOUS BLD VENIPUNCTURE: CPT | Performed by: PATHOLOGY

## 2021-09-23 PROCEDURE — 82607 VITAMIN B-12: CPT | Performed by: PATHOLOGY

## 2021-09-23 PROCEDURE — 82330 ASSAY OF CALCIUM: CPT | Performed by: PATHOLOGY

## 2021-09-23 PROCEDURE — 92526 ORAL FUNCTION THERAPY: CPT | Mod: GN | Performed by: SPEECH-LANGUAGE PATHOLOGIST

## 2021-09-23 PROCEDURE — 92610 EVALUATE SWALLOWING FUNCTION: CPT | Mod: GN | Performed by: SPEECH-LANGUAGE PATHOLOGIST

## 2021-09-23 PROCEDURE — 99215 OFFICE O/P EST HI 40 MIN: CPT | Performed by: PSYCHIATRY & NEUROLOGY

## 2021-09-23 PROCEDURE — 82525 ASSAY OF COPPER: CPT | Mod: 90 | Performed by: PATHOLOGY

## 2021-09-23 PROCEDURE — 83516 IMMUNOASSAY NONANTIBODY: CPT | Mod: 90 | Performed by: PATHOLOGY

## 2021-09-23 PROCEDURE — 92522 EVALUATE SPEECH PRODUCTION: CPT | Mod: GN | Performed by: SPEECH-LANGUAGE PATHOLOGIST

## 2021-09-23 PROCEDURE — 86255 FLUORESCENT ANTIBODY SCREEN: CPT | Mod: 90 | Performed by: PATHOLOGY

## 2021-09-23 PROCEDURE — 82550 ASSAY OF CK (CPK): CPT | Performed by: PATHOLOGY

## 2021-09-23 PROCEDURE — 94375 RESPIRATORY FLOW VOLUME LOOP: CPT | Performed by: INTERNAL MEDICINE

## 2021-09-23 PROCEDURE — 80076 HEPATIC FUNCTION PANEL: CPT | Performed by: PATHOLOGY

## 2021-09-23 RX ORDER — GLYCOPYRROLATE 1 MG/1
1 TABLET ORAL 3 TIMES DAILY
Qty: 270 TABLET | Refills: 1 | Status: SHIPPED | OUTPATIENT
Start: 2021-09-23 | End: 2022-01-01

## 2021-09-23 ASSESSMENT — REVISED AMYOTROPHIC LATERAL SCLEROSIS FUNCTIONAL RATING SCALE (ALSFRS-R)
TURNING_IN_BED_AND_ADJUSTING_BED_CLOTHES: 4
CLIMBING_STAIRS: 4
SALIVATION: 2
SPEECH: 1
RESPIRATORY_INSUFFICIENCY: 4
DRESSING_AND_HYGEINE: 4
CUTTING_FOOD_AND_HANDLING_UTENSILES: 4
BULBAR_SUBTOTAL: 6
DRESSING_AND_HYGEINE: NORMAL FUNCTION
TURNING_IN_BED_AND_ADJUSTING_BED_CLOTHES: NORMAL
HANDWRITING: 4
SWALLOWING: EARLY EATING PROBLEMS - OCCASIONAL CHOKING
SPEECH: SPEECH COMBINED WITH NON-VOCAL COMMUNICATION
GROSS_MOTOR_SUBTOTAL_SCORE: 12
CLIMBING_STAIRS: NORMAL
WALKING: 4
ORTHOPENA: 4
WALKING: NORMAL
DYSPNEA: 4
FINE_MOTOR_SUBTOTAL_SCORE: 12
ALSFRS_TOTAL_SCORE: 42
SALIVATION: MODERATELY EXCESSIVE SALIVA; MAY HAVE MINIMAL DROOLING
HANDWRITING: NORMAL
SIX_ITEM_SUBTOTAL: 21
RESPIRATORY_SUBTOTAL_SCORE: 12
SWALLOWING: 3

## 2021-09-23 ASSESSMENT — PAIN SCALES - GENERAL: PAINLEVEL: NO PAIN (0)

## 2021-09-23 NOTE — PATIENT INSTRUCTIONS
Start glycopyrolate 1mg three times a day for secretions    Labs today      Please call [facility] @ [phone] for questions or concerns during regular business hours. For a more efficient way to communicate, use ProTip and address the message to your physician. Remember, MyChart is only read during business hours. Do not leave urgent messages on voicemail or [email or web address]. If situation is urgent, contact the Neurology Clinic @ [phone] and ask to speak to a Triage Nurse or Call 911 or visit an Emergency Department.    Please call your pharmacy if you need a medication refill. They will send us an electronic message.

## 2021-09-23 NOTE — PROGRESS NOTES
Outpatient Speech Language Pathology Neurology Clinic Evaluation  Bailey Malhotra YOB: 1955 [id number]    Visit Date: 9/23/21    Age: 66 year old    Medical Diagnosis: Amyotrophic lateral sclerosis (ALS)    Date of Diagnosis: Initial PBP 4/7/21, bulbar onset ALS 6/17/21    Referring MD: Dr Perkins     present: No    PMH: Refer to Medical Chart    Fall Risk:Refer to physician report    Others at Clinic Visit: Son    Living Situation: With others (son and grandson)    Patient Concerns/Goals: Increased swallowing difficulty, Increased speech deficits, Augmentative / Alternative communication needs    Observations: Patient is pleasant and cooperative, she denies significant changes but son reports significant decline in speech and swallowing.    Current Mode of Nutrition: Oral diet of regular solids and thin liquids, she denies changes in diet despite recommendations last visit    Weight: 150lbs (159lbs at dx 4/7/21, 155lbs on 6/17/21)    Cardio-Respiratory Status: Forced vital capacity: 62%    Functional Rating Scale (ALS-FRS): 42/48      Oral Motor/Swallowing  Oral Motor Function: Anomalies present:    Mandibular anomaly- none  Labial anomaly- ROM (severe), Strength (severe), Rate (severe)  Lingual anomaly- ROM (severe), Strength (severe), and Rate (severe)  Velar elevation- reduced, hypernasal quality  Buccal strength- adequate    Volitional Abilities:  Cough- Weak  Throat Clear- Functional  Swallow- Present    Swallow Function:   Thin Liquid-  Anterior spillage, severely reduced bolus prep/control and A-P propulsion, suspected premature spillage, moderately reduced laryngeal elevation, multiple swallows to pass bolus (5-6) with OVERT s/s aspiration.  Nectar Thick Liquid- not assessed, pt has not been utilizing despite recommendations last visit, ongoing education provided  Regular Solid-  Increased mastication time, finger sweep needed to maneuver solids for mastication, severely reduced  bolus prep/control and A-P propulsion with moderately reduced laryngeal elevation but no overt s/s aspiration.    Dysphagia Diagnosis: Moderate pharyngeal, severe oral dysphagia      Speech Intelligibility/Functional Communication   Methods of communication: Verbal    Dysarthria: Severe-Profound (some vocalizations used for communication)    Speech:   Anarthria- no functional speech    Speech Intelligibility/Communication:  Impacts daily activities, Impacts social activities, Impacts phone usage and Impacts ability to communicate basic wants/needs    Augmentative and Alternative Communication (AAC):   Has iPad with Speech Assistant which she uses throughout session. She has independently pre-stored messages to be used during visit and uses all features of keyboard appropriately. She also has Boogie Board but reports she mostly uses iPad.      Cognition/Behavior  Cognitive Linguistic Function: Adequate during visit    ALS-CBS (Cognitive Behavioral Screen): completed and does not suggest impairment, see Synopsis      Clinical Impression/Plan of Care  Treatment Diagnosis: Oropharyngeal Dysphagia     Recommendations:  Oral diet of pureed solids and nectar thick liquids  Consider gastrostomy ASAP  Continue use of safe swallow strategies  Continue use of AAC    Plan of Care:  1 session evaluation and treatment.    Goals: Based on today's evaluation session patient and/or caregiver will have understanding of current communication and/or swallowing status and recommendations for management.    Educational Assessment:  Learners- Patient and Daughter-in-Law  Barriers to Learning- No barriers.    Education provided/response:   Speech  Swallowing  AAC  Diet  Verbalized understanding    Treatment provided this date:   Swallow intervention, 27 minutes  SLP educated in swallowing anatomy and physiology including aspiration and possible respiratory compromise from aspiration. Trained safe swallow strategies to reduce aspiration  risks (including slow rate of intake, small bites/sips, chin tuck/neutral head position, pills in purees, mindful swallowing, monitoring for fatigue with intake) Educated in role of oral cares in respiratory health with increased aspiration risks, trained and encouraged good oral hygiene. Also trained diet modifications to reduce risk of aspiration and ease intake (including pureed solids and thickened liquids). SLP educated in thickened liquids including rationale and possible benefit in reducing aspiration. Provided training in thickening options including powders, gels, pre-thickened liquids, and naturally thick liquids (shakes, smoothies, etc). Detailed education in gastrostomy including benefit due to high risk of aspiration suggested by findings today and option to have gastrostomy placed but continuing PO intake. She will discuss with MD as well.  Communication intervention, 2 minutes  Patient dysarthria progressed rapidly and she is now relying on AAC for communication however does use appropriate gestures and vocalizations to supplement. She denies questions regarding use of AAC and demonstrates independent functional use throughout visit. Cognitive screen (ALS-CBS) completed and suggests within normal limits.    Response to recommendations/treatment: verbalized understanding, asked appropriate questions, agreed to recommendations    Goal attainment: All goals met    Risks and benefits of evaluation/treatment have been explained.  Patient, family and/or caregiver are in agreement with Plan of Care.    Timed Code Treatment Minutes: 0 minutes of timed codes    Total Treatment Time (sum of timed and untimed services): 56 minutes

## 2021-09-23 NOTE — LETTER
2021       RE: Bailey Malhotra  [address] [facility] [address]     Dear Colleague,    Thank you for referring your patient, Bailey Malhotra, to the [facility] [place] at [facility]. Please see a copy of my visit note below.    [facility]: [place]  Neuromuscular Progress Note    Patient Name:  Bailey Malhotra  MRN:  [id number]    :  1955  Date of Service:  Sep 23, 2021  Primary care provider:  Daniel Fortune    Brief Summary:   Ms. Malhotra is a 66 year old woman with a h/o progressive bulbar palsy who presents for ALS follow-up. Symptom onset of dysphagia in Summer 2020 along with pseudobulbar affect. Started on Riluzole in 2021.     Subjective:   She was last seen in clinic on 21. Started on Riluzole in 2021 after LFTs normalized. Radicava has not been started yet, clearing through insurance from previous denial. Is unsure if she would like to start it if she doesn't qualify for home infusions. No longer taking Nuedexta, unsure when she stopped it.     Cough: She does not have cough assist or mucinex    Breathing: No SOB at rest or with exertion. No orthopnea  Sleep: Sleeping well, no morning headaches or daytime sleepiness  Dysarthria:  Anarthric, uses iPad  Dysphagia: present, mostly with pills and liquids. States she's not choking, but does cough with eating (son states she chokes with eating)  Sialorrhea: present, progressive. Noting thicker secretions  Weight: Lost about 4lbs over the last 2 months   Mobility: No difficulty  Spasticity: None  Pain/Cramps: None  ADLs: No difficulty  Depression: None  Anxiety: None  Pseudobulbar: present, on sertraline and has been helpful  Cognitive: No concerns  Life Planning: No ACP on file  Caregiver: Lives with son                                                 ROS: A 10-point ROS was performed as per HPI.    PMH:  Past Medical History:    Diagnosis Date     Cholelithiasis      GERD (gastroesophageal reflux disease)      Past Surgical History:   Procedure Laterality Date     TUBAL LIGATION         Medications:      Current Outpatient Medications:      aspirin (ASA) 81 MG chewable tablet, Take 81 mg by mouth daily, Disp: , Rfl:      calcium carbonate 600 mg-vitamin D 400 units (CALTRATE) 600-400 MG-UNIT per tablet, Take 1 tablet by mouth daily, Disp: , Rfl:      diphenhydrAMINE-acetaminophen (TYLENOL PM)  MG tablet, Take 2 tablets by mouth daily, Disp: , Rfl:      famotidine (PEPCID) 20 MG tablet, , Disp: , Rfl:      LANsoprazole (PREVACID) 30 MG DR capsule, , Disp: , Rfl:      multivitamin, therapeutic with minerals (THERA-VIT-M) TABS tablet, Take 1 tablet by mouth daily, Disp: , Rfl:      NUEDEXTA 20-10 MG capsule, Take 1 capsule by mouth See Admin Instructions, Disp: , Rfl:      riluzole (RILUTEK) 50 MG tablet, Take 1 tablet (50 mg) by mouth every 12 hours, Disp: 60 tablet, Rfl: 3     riluzole (RILUTEK) 50 MG tablet, Take 1 tablet (50 mg) by mouth every 12 hours, Disp: 60 tablet, Rfl: 3     sertraline (ZOLOFT) 50 MG tablet, Take 1.5 Tablets daily, Disp: 45 tablet, Rfl: 2  Not taking Nudexta    Physical Examination:   LMP  (LMP Unknown)   Wt Readings from Last 4 Encounters:   09/23/21 68.2 kg (150 lb 6.4 oz)   06/17/21 70.3 kg (155 lb)   06/08/21 70.3 kg (155 lb)   04/07/21 72.1 kg (159 lb)     General: pt sitting comfortably in chair, breathing easily on ra, in NAD   -MS: alert, answering questions appropriately    NM Exam: Cranial     Atrophy Fasciculations   Tongue: Positive Positive      Facial weakness: moderate  Tongue movements: slow  Tongue weakness: moderate  Jaw jerk: present  Speech: anarthric    NM Exam: Axial    Neck flexion weakness: moderate  Neck extension weakness: none    NM Exam: Upper Extremities     Right Left   Atrophy: Negative Negative   Fasciculations: Negative Negative   Muscle tone: normal normal   Rapid alt.  movements:     Reflexes Right Left   Biceps: increased increased   Brachioradialis: increased increased   Triceps: increased increased   Braga: right Braga reflex present left Braga reflex present   Strength Right Left   * Indicates a recommended field     Shoulder abduction*: >4 *arthritis 5   Elbow flexion: 5 5   Elbow extension*: 4+ 5   Wrist extension*: 5 5   Finger extension: 4+ 5   Finger flexion:     Abductor pollicis brevis: 4+ 5   First dorsal interosseous*: 5- 5   Abductor digiti minimi:         NM Exam: Lower Extremities     Right Left   Atrophy: Negative Negative   Fasciculations: Negative Negative   Muscle tone: normal normal   Rapid alt. movements:     Reflexes Right Left   Patellar: increased increased   Achilles: normal normal   Plantar:     Strength Right Left   * Indicates a recommended field     Hip flexion*: 5- 5-   Knee extension*: 5 5   Knee flexion: 5 5   Ankle dorsiflexion*: 5 5   Ankle plantar flexion: 5 5   Normal gait: yes     Investigations:    PFTs    4/7/2021: FVC 69%, MEP 36, MIP -35   9/23/2021: FVC 62% MEP 34 MIP -33  Lab:    LFTs: 6/17 ALT 34 AST 24 Alk Phos 120    Impression:  Ms. Malhotra is a 66 year old woman with a h/o progressive bulbar palsy who presents for ALS follow-up.     1) Progressive bulbar palsy  Symptom onset of dysphagia in Summer 2020 along with pseudobulbar affect.  Overall symptoms are mostly isolated to the bulbar region with dysarthria and dysphagia.  On examination she has both upper and lower motor neuron findings with fasciculations in the bulbar region.  We discussed with her the diagnosis of progressive bulbar palsy today along with the natural history of the disease.  Started riluzole at last visit and doing well, due for LFTs today.  Given her dysarthria and dysphagia she will also meet with our speech therapist to assess swallow function.  We also reviewed indications for gastrostomy, discussed concern and safety for swallowing. She states she  would like to wait for gtube placement. Discussed indications and recommendation for BiPAP use, would like to wait at this time.  For secretions we recommend starting glycopyrolate, discussed dosing and side effects. Will report efficacy. Doing well on Sertraline and requesting refill at this time, will place. Will complete cognitive screen today as this has not been completed at this point, she has no cognitive concerns.     Recommendations:   -LFTs today  - Continue Riluzole  - Refill Sertraline  - Start glycopyrolate 1mg TID PRN  -SLP  -PFTs today  -RTC 3 mo    Seen and discussed with Dr. Perkins. His addendum follows    Silvina Shoemaker MD  Neuromuscular Fellow      I personally examined the patient and concur with the resident's note. We strongly encouraged gastrostomy, and offered VFSS largely as an educational tool, and she declined. We outlined the indications for gastrostomy and concerns regarding the window of opportunity for performing the procedure safely to her and her son.     The patient did indicate an interest in Radicava if it could be obtained at home. While it is not clear this would be possible, it would depend upon the results of the home infusion provider's benefits investigation, which is difficult to predetermine. Hence as I do feel she would be a good candidate and would benefit from the drug I will review the status of an insurance appeal.     Phi Perkins M.D.    52 minutes spent on the date of the encounter on chart review, history and examination, documentation and further activities as noted above.      Answers for HPI/ROS submitted by the patient on 9/19/2021  General Symptoms: No  Skin Symptoms: No  HENT Symptoms: No  EYE SYMPTOMS: No  HEART SYMPTOMS: No  LUNG SYMPTOMS: No  INTESTINAL SYMPTOMS: No  URINARY SYMPTOMS: No  GYNECOLOGIC SYMPTOMS: No  BREAST SYMPTOMS: No  SKELETAL SYMPTOMS: No  BLOOD SYMPTOMS: No  NERVOUS SYSTEM SYMPTOMS: No  MENTAL HEALTH SYMPTOMS: No          Again, thank you  for allowing me to participate in the care of your patient.      Sincerely,    Phi Perkins MD

## 2021-09-23 NOTE — PROGRESS NOTES
[facility]: [place]  Neuromuscular Progress Note    Patient Name:  Bailey Malhotra  MRN:  [id number]    :  1955  Date of Service:  Sep 23, 2021  Primary care provider:  Daniel Fortune    Brief Summary:   Ms. Malhotra is a 66 year old woman with a h/o progressive bulbar palsy who presents for ALS follow-up. Symptom onset of dysphagia in Summer 2020 along with pseudobulbar affect. Started on Riluzole in 2021.     Subjective:   She was last seen in clinic on 21. Started on Riluzole in 2021 after LFTs normalized. Radicava has not been started yet, clearing through insurance from previous denial. Is unsure if she would like to start it if she doesn't qualify for home infusions. No longer taking Nuedexta, unsure when she stopped it.     Cough: She does not have cough assist or mucinex    Breathing: No SOB at rest or with exertion. No orthopnea  Sleep: Sleeping well, no morning headaches or daytime sleepiness  Dysarthria:  Anarthric, uses iPad  Dysphagia: present, mostly with pills and liquids. States she's not choking, but does cough with eating (son states she chokes with eating)  Sialorrhea: present, progressive. Noting thicker secretions  Weight: Lost about 4lbs over the last 2 months   Mobility: No difficulty  Spasticity: None  Pain/Cramps: None  ADLs: No difficulty  Depression: None  Anxiety: None  Pseudobulbar: present, on sertraline and has been helpful  Cognitive: No concerns  Life Planning: No ACP on file  Caregiver: Lives with son                                                 ROS: A 10-point ROS was performed as per HPI.    PMH:  Past Medical History:   Diagnosis Date     Cholelithiasis      GERD (gastroesophageal reflux disease)      Past Surgical History:   Procedure Laterality Date     TUBAL LIGATION         Medications:      Current Outpatient Medications:      aspirin (ASA) 81 MG chewable tablet, Take 81 mg by mouth daily, Disp: , Rfl:      calcium  carbonate 600 mg-vitamin D 400 units (CALTRATE) 600-400 MG-UNIT per tablet, Take 1 tablet by mouth daily, Disp: , Rfl:      diphenhydrAMINE-acetaminophen (TYLENOL PM)  MG tablet, Take 2 tablets by mouth daily, Disp: , Rfl:      famotidine (PEPCID) 20 MG tablet, , Disp: , Rfl:      LANsoprazole (PREVACID) 30 MG DR capsule, , Disp: , Rfl:      multivitamin, therapeutic with minerals (THERA-VIT-M) TABS tablet, Take 1 tablet by mouth daily, Disp: , Rfl:      NUEDEXTA 20-10 MG capsule, Take 1 capsule by mouth See Admin Instructions, Disp: , Rfl:      riluzole (RILUTEK) 50 MG tablet, Take 1 tablet (50 mg) by mouth every 12 hours, Disp: 60 tablet, Rfl: 3     riluzole (RILUTEK) 50 MG tablet, Take 1 tablet (50 mg) by mouth every 12 hours, Disp: 60 tablet, Rfl: 3     sertraline (ZOLOFT) 50 MG tablet, Take 1.5 Tablets daily, Disp: 45 tablet, Rfl: 2  Not taking Nudexta    Physical Examination:   LMP  (LMP Unknown)   Wt Readings from Last 4 Encounters:   09/23/21 68.2 kg (150 lb 6.4 oz)   06/17/21 70.3 kg (155 lb)   06/08/21 70.3 kg (155 lb)   04/07/21 72.1 kg (159 lb)     General: pt sitting comfortably in chair, breathing easily on ra, in NAD   -MS: alert, answering questions appropriately    NM Exam: Cranial     Atrophy Fasciculations   Tongue: Positive Positive      Facial weakness: moderate  Tongue movements: slow  Tongue weakness: moderate  Jaw jerk: present  Speech: anarthric    NM Exam: Axial    Neck flexion weakness: moderate  Neck extension weakness: none    NM Exam: Upper Extremities     Right Left   Atrophy: Negative Negative   Fasciculations: Negative Negative   Muscle tone: normal normal   Rapid alt. movements:     Reflexes Right Left   Biceps: increased increased   Brachioradialis: increased increased   Triceps: increased increased   Braga: right Braga reflex present left Braga reflex present   Strength Right Left   * Indicates a recommended field     Shoulder abduction*: >4 *arthritis 5   Elbow  flexion: 5 5   Elbow extension*: 4+ 5   Wrist extension*: 5 5   Finger extension: 4+ 5   Finger flexion:     Abductor pollicis brevis: 4+ 5   First dorsal interosseous*: 5- 5   Abductor digiti minimi:         NM Exam: Lower Extremities     Right Left   Atrophy: Negative Negative   Fasciculations: Negative Negative   Muscle tone: normal normal   Rapid alt. movements:     Reflexes Right Left   Patellar: increased increased   Achilles: normal normal   Plantar:     Strength Right Left   * Indicates a recommended field     Hip flexion*: 5- 5-   Knee extension*: 5 5   Knee flexion: 5 5   Ankle dorsiflexion*: 5 5   Ankle plantar flexion: 5 5   Normal gait: yes     Investigations:    PFTs    4/7/2021: FVC 69%, MEP 36, MIP -35   9/23/2021: FVC 62% MEP 34 MIP -33  Lab:    LFTs: 6/17 ALT 34 AST 24 Alk Phos 120    Impression:  Ms. Malhotra is a 66 year old woman with a h/o progressive bulbar palsy who presents for ALS follow-up.     1) Progressive bulbar palsy  Symptom onset of dysphagia in Summer 2020 along with pseudobulbar affect.  Overall symptoms are mostly isolated to the bulbar region with dysarthria and dysphagia.  On examination she has both upper and lower motor neuron findings with fasciculations in the bulbar region.  We discussed with her the diagnosis of progressive bulbar palsy today along with the natural history of the disease.  Started riluzole at last visit and doing well, due for LFTs today.  Given her dysarthria and dysphagia she will also meet with our speech therapist to assess swallow function.  We also reviewed indications for gastrostomy, discussed concern and safety for swallowing. She states she would like to wait for gtube placement. Discussed indications and recommendation for BiPAP use, would like to wait at this time.  For secretions we recommend starting glycopyrolate, discussed dosing and side effects. Will report efficacy. Doing well on Sertraline and requesting refill at this time, will place.  Will complete cognitive screen today as this has not been completed at this point, she has no cognitive concerns.     Recommendations:   -LFTs today  - Continue Riluzole  - Refill Sertraline  - Start glycopyrolate 1mg TID PRN  -SLP  -PFTs today  -RTC 3 mo    Seen and discussed with Dr. Perkins. His addendum follows    Silvina Shoemaker MD  Neuromuscular Fellow      I personally examined the patient and concur with the resident's note. We strongly encouraged gastrostomy, and offered VFSS largely as an educational tool, and she declined. We outlined the indications for gastrostomy and concerns regarding the window of opportunity for performing the procedure safely to her and her son.     The patient did indicate an interest in Radicava if it could be obtained at home. While it is not clear this would be possible, it would depend upon the results of the home infusion provider's benefits investigation, which is difficult to predetermine. Hence as I do feel she would be a good candidate and would benefit from the drug I will review the status of an insurance appeal.     Phi Perkins M.D.    52 minutes spent on the date of the encounter on chart review, history and examination, documentation and further activities as noted above.      Answers for HPI/ROS submitted by the patient on 9/19/2021  General Symptoms: No  Skin Symptoms: No  HENT Symptoms: No  EYE SYMPTOMS: No  HEART SYMPTOMS: No  LUNG SYMPTOMS: No  INTESTINAL SYMPTOMS: No  URINARY SYMPTOMS: No  GYNECOLOGIC SYMPTOMS: No  BREAST SYMPTOMS: No  SKELETAL SYMPTOMS: No  BLOOD SYMPTOMS: No  NERVOUS SYSTEM SYMPTOMS: No  MENTAL HEALTH SYMPTOMS: No

## 2021-09-25 LAB
ACHR BIND AB SER-SCNC: 0 NMOL/L
ACHR BLOCK AB/ACHR TOTAL SFR SER: 5 %
ACHR MOD AB/ACHR TOTAL SFR SER: 0 %
COPPER SERPL-MCNC: 107 UG/DL
STRIA MUS IGG SER QL IF: NORMAL

## 2021-09-26 LAB — PARANEOPLASTIC AB SER QL IF: NORMAL

## 2021-10-11 ENCOUNTER — HEALTH MAINTENANCE LETTER (OUTPATIENT)
Age: 66
End: 2021-10-11

## 2021-12-30 NOTE — PROGRESS NOTES
"CLINICAL NUTRITION SERVICES - ASSESSMENT NOTE    Bailey Malhotra 66 year old referred for MNT related to ALS    Visit Type: initial face-to-face  Phone call to patient April 2021  Pt accompanied by: her Audrey CRAWFORD; SLP  Referring Physician: Maddie    NUTRITION HISTORY  Factors affecting nutrition intake include:swallowing difficulties  Current diet/appetite: general diet, mostly softer foods/declining intake      Diet Recall - eggs, ham, yogurt, peanut butter toast, coffee, pears, cucumbers, shrimp or chicken ignacio with pasta    She tells me that liquids are more difficult to swallow than solids.    She does not admit to avoiding specific foods due to difficulty swallowing.     ANTHROPOMETRICS  Height: 64\"  Weight: 144 lbs/66kg  BMI: 24.7  Weight Status:  Normal BMI  IBW: 120 lbs (120%)  Weight History: down 4 lb x past 4 months; 10 lb wt loss from April to Sept; ~14 lb (9%) wt loss since previous RD visit   Wt Readings from Last 7 Encounters:   12/30/21 66.4 kg (146 lb 6.4 oz)   09/23/21 68.2 kg (150 lb 6.4 oz)   06/17/21 70.3 kg (155 lb)   06/08/21 70.3 kg (155 lb)   04/07/21 72.1 kg (159 lb)   04/07/21 72.1 kg (159 lb)   04/01/21 72.8 kg (160 lb 8 oz)     Dosing Weight: 66kg    Medications/vitamins/minerals/herbals:   Reviewed    Labs:   Labs reviewed    NUTRITION FOCUSED PHYSICAL ASSESSMENT FOR DIAGNOSING MALNUTRITION:  Not observed due to Covid 19 pandemic - no clinic contact  Consult for education only      ASSESSED NUTRITION NEEDS:  Estimated Energy Needs: 9041-7562 kcals (25-30 Kcal/Kg)  Justification: maintenance  Estimated Protein Needs: 65-80 grams protein (1-1.2 g pro/Kg)  Justification: maintenance  Estimated Fluid Needs: 1600  mL   Justification: maintenance    MALNUTRITION:  % Weight Loss:  > 10% in 6 months (severe malnutrition)  % Intake:  <75% for >/= 3 months (moderate malnutrition)  Subcutaneous Fat Loss:  None observed  Muscle Loss:  None observed  Fluid Retention:  None noted    Malnutrition " Diagnosis: Moderate malnutrition  In Context of:  Chronic illness or disease    NUTRITION DIAGNOSIS:  Inadequate oral intake related to dysphagia as evidenced by patient modifying food textures and choices, 9% wt loss x past 6 months    INTERVENTIONS  Provided written & verbal education:     - Reviewed nutrition needs. Encouraged pt to aim for at least 1800kcal and 70g protein daily.   - Discussed strategies to help fortify meals and snacks with calories.   - Reviewed previous oral nutrition supplement options sent via email (Ensure Enlive, Ensure Plus/Boost Plus, CIB with [employer] milk etc).   - Encouraged utilizing these ONS in home made shakes/smoothies to prevent flavor fatigue.   - Reviewed PEG tube model (regular and LUCIO), MOA (syringe bolus, gravity bag bolus et), formula (standard and specialty).  Discussed that she can still take PO (if deemed safe by SLP) with feeding tube. Discussed uses for hydration and % of nutrition needs. Discussed insurance coverage and role of home infusion.   Encouraged placement of feeding tube sooner than later to prevent further weight loss, malnutrition etc.    Sent patient email with links to [employer] Feeding Tube video and additional Feeding tube resources from [employer].     Provided pt with corresponding education materials/handouts on:  High Calorie/High Protein Recipe booklet, Tips to Increase the Calories in Your Diet and Sources of Protein; Video links for tube feeding.     Pt verbalize understanding of materials provided during consult.   Patient Understanding: good  Expected patient engagement: good     Goals  1.  Aim for 5-6 small frequent meals  2.  Aim for 1800kcal and 70g protein/day  3. Weight maintenance     Follow-Up Plans: Pt has RD contact information for questions.      MONITORING AND EVALUATION:  -Food/beverage intake  -EN access  -Weight trends    Izabel Morris RD, LD

## 2021-12-30 NOTE — PATIENT INSTRUCTIONS
Can trial Mucinex/gaufiensen to help clear mucus, start using twice a day to start. Can increase as needed    Can try atropine drops for decreasing the saliva/drooling. Use 1-2 drops, two to three times as needed    Please stop by the lab on your way out to test your liver function.    We will work on scheduling your gastrostomy tube placement for approximately 3 months from now as requested. We would still like you to check in with our dietician in about 4-6 week to assess your swallowing, weight, and overall function. If you feel like you would like to move up your procedure at any time, please let us know and we will help arrange this for you.    Please call [facility] @ [phone] for questions or concerns during regular business hours. For a more efficient way to communicate, use [employer] and address the message to your physician. Remember, sciencebitet is only read during business hours. Do not leave urgent messages on voicemail or sciencebitet. If situation is urgent, contact the Neurology Clinic @ [phone] and ask to speak to a Triage Nurse or Call 911 or visit an Emergency Department.     Please call your pharmacy if you need a medication refill. They will send us an electronic message.

## 2021-12-30 NOTE — LETTER
2021       RE: Bailey Malhotra  [address] [facility] [address]     Dear Colleague,    Thank you for referring your patient, Bailey Malhotra, to the [facility] [place] at [facility]. Please see a copy of my visit note below.    [facility]: [place]  Neuromuscular Progress Note    Patient Name:  Bailey Malhotra  MRN:  [id number]    :  1955  Date of Service:  Sep 23, 2021  Primary care provider:  Daniel Fortune    Brief Summary:   Ms. Malhotra is a 66 year old woman with a h/o progressive bulbar palsy who presents for ALS follow-up. Symptom onset of dysphagia in Summer 2020 along with pseudobulbar affect. Started on Riluzole in 2021.     Subjective:   She was last seen in clinic on 21. Started on Riluzole in 2021 after LFTs normalized. Radicava has not been started yet, clearing through insurance from previous denial. Is unsure if she would like to start it if she doesn't qualify for home infusions. No longer taking Nuedexta, unsure when she stopped it.     Cough: She does not have cough assist or mucinex    Breathing: No SOB at rest or with exertion. No orthopnea  Sleep: Sleeping well, no morning headaches or daytime sleepiness  Dysarthria:  Anarthric, uses iPad  Dysphagia: present, mostly with pills and liquids. States she's not choking, but does cough with eating (daughter-in-law states she chokes with eating); not much changed in the last 3 months  Sialorrhea: present, progressive. Noting thicker secretions; stopped glycopyrrolate after a few days due to constipation.  Weight: Lost about 4lbs over the last 3 months   Mobility: No difficulty  Spasticity: None  Pain/Cramps: None  ADLs: No difficulty  Depression: None  Anxiety: None  Pseudobulbar: present, on sertraline and has been helpful  Cognitive: No concerns  Life Planning: No Al now; family checks in on her                                                  ROS: A 10-point ROS was performed as per HPI.    PMH:  Past Medical History:   Diagnosis Date     Cholelithiasis      GERD (gastroesophageal reflux disease)      Past Surgical History:   Procedure Laterality Date     TUBAL LIGATION         Medications:      Current Outpatient Medications:      aspirin (ASA) 81 MG chewable tablet, Take 81 mg by mouth daily, Disp: , Rfl:      calcium carbonate 600 mg-vitamin D 400 units (CALTRATE) 600-400 MG-UNIT per tablet, Take 1 tablet by mouth daily, Disp: , Rfl:      diphenhydrAMINE-acetaminophen (TYLENOL PM)  MG tablet, Take 2 tablets by mouth daily, Disp: , Rfl:      glycopyrrolate (ROBINUL) 1 MG tablet, Take 1 tablet (1 mg) by mouth 3 times daily, Disp: 270 tablet, Rfl: 1     multivitamin, therapeutic with minerals (THERA-VIT-M) TABS tablet, Take 1 tablet by mouth daily, Disp: , Rfl:      NUEDEXTA 20-10 MG capsule, Take 1 capsule by mouth See Admin Instructions, Disp: , Rfl:      riluzole (RILUTEK) 50 MG tablet, Take 1 tablet (50 mg) by mouth every 12 hours, Disp: 60 tablet, Rfl: 2     riluzole (RILUTEK) 50 MG tablet, Take 1 tablet (50 mg) by mouth every 12 hours, Disp: 60 tablet, Rfl: 3     sertraline (ZOLOFT) 50 MG tablet, Take 1.5 Tablets daily, Disp: 45 tablet, Rfl: 4     famotidine (PEPCID) 20 MG tablet, , Disp: , Rfl:      LANsoprazole (PREVACID) 30 MG DR capsule, , Disp: , Rfl:   Not taking Nudexta or glycopyrolate    Physical Examination:   BP (!) 152/87   Pulse 69   Resp 16   Wt 66.4 kg (146 lb 6.4 oz)   LMP  (LMP Unknown)   SpO2 96%   BMI 25.13 kg/m    Wt Readings from Last 4 Encounters:   12/30/21 66.4 kg (146 lb 6.4 oz)   09/23/21 68.2 kg (150 lb 6.4 oz)   06/17/21 70.3 kg (155 lb)   06/08/21 70.3 kg (155 lb)     General: pt sitting comfortably in chair, breathing easily on ra, in NAD   -MS: alert, answering questions appropriately    NM Exam: Cranial     Atrophy Fasciculations   Tongue: Positive Positive      Facial weakness:  moderate/severe  Tongue movements: slow  Tongue weakness: moderate/severe  Jaw jerk: present  Speech: anarthric    NM Exam: Axial    Neck flexion weakness: moderate  Neck extension weakness: none    NM Exam: Upper Extremities     Right Left   Atrophy: Negative Negative   Fasciculations: Negative Negative   Muscle tone: normal normal   Rapid alt. movements:     Reflexes Right Left   Biceps: increased increased   Brachioradialis: increased increased   Triceps: increased increased   Braga: right Braga reflex present left Braga reflex present   Strength Right Left   * Indicates a recommended field     Shoulder abduction*: >4 *arthritis 5   Elbow flexion: 4+ 5   Elbow extension*: 4+ 4+   Wrist extension*: 5 5   Finger extension: 4- 4   Finger flexion:     Abductor pollicis brevis: 4 4+   First dorsal interosseous*: 4+ 5   Abductor digiti minimi:         NM Exam: Lower Extremities     Right Left   Atrophy: Negative Negative   Fasciculations: Negative Negative   Muscle tone: normal normal   Rapid alt. movements:     Reflexes Right Left   Patellar: increased increased   Achilles: normal normal   Plantar:     Strength Right Left   * Indicates a recommended field     Hip flexion*: 4 4+   Knee extension*: 5 5   Knee flexion: 5 5   Ankle dorsiflexion*: 5 5   Ankle plantar flexion: 5 5   Normal gait: yes, able to walk on heels     Investigations:    PFTs    4/7/2021: FVC 69%, MEP 36, MIP -35   9/23/2021: FVC 62% MEP 34 MIP -33   12/30/2021: FVC 36% MEP 31 MIP -25  Lab:    LFTs: 6/17/21 ALT 34 AST 24 Alk Phos 120   LFTs: 9/23/21: ALT 52 AST 38 Alk Phos 163    Impression:  Ms. Malhotra is a 66 year old woman with a h/o progressive bulbar palsy who presents for ALS follow-up.     1) Progressive bulbar palsy  Symptom onset of dysphagia in Summer 2020 along with pseudobulbar affect.  Overall symptoms are mostly isolated to the bulbar region with dysarthria and dysphagia but with some more recent weakness in the bilateral upper  extremities, which has progressed slightly since last visit. Continued riluzole and doing well, due for LFTs today. Slight increase in LFTs in September, will be important to track going forward.  Given her dysarthria and dysphagia she will also meet with our speech therapist to assess swallow function. We also reviewed indications for gastrostomy, discussed concern and safety for swallowing. She states she would like to wait for gtube placement. Clearly stated that she would like a gtube eventually, agreed to get on the schedule for 3 months from now so that a date is present. Discussed safety factors of swallowing/choking, concerns for waiting (weight loss/breathing concerns). Discussed indications and recommendation for BiPAP use, would like to wait at this time.  For secretions she failed glycopyrolate, discussed trailing atropine drops at this time. Would not recommend botox currently given still eating by mouth and concern for worsening weakness. Will report efficacy. Doing well on Sertraline, recommend continuting. Has never had genetic testing completed, will discuss briefly with our genetic counselor today. All questions answered    Recommendations:   - LFTs today  - Continue Riluzole  - Continue Sertraline  - Start atropine drops TID PRN  - genetic counselor today  - SLP today  - PFTs today  - gastrostomy tube placement scheduled for 3 months  - RTC 3 mo, will touch base with dietician in 4-6 weeks to re-assess swallow function    Seen and discussed with Dr. Perkins. His addendum follows    Silvina Shoemaker MD  Neuromuscular Fellow    I personally examined the patient and concur with the resident's note. Dr Shoemaker and I explained in detail the indications and rationale for gastrostomy, the rationale and evidence base for NIV, and the rationale for genetic testing, including possible eligibility for gene-specific clinical research or expanded access programs. Ms Malhotra chooses to proceed with gastrostomy within 3  months, and we will arrange a check-in by telephone before then. She has concerning FVC but denies any symptoms of ventilatory disturbance awake or asleep, but may need to be evaluated for surgical gastrostomy rather than IR if symptoms worsen.     Phi Perkins M.D.

## 2021-12-30 NOTE — PROGRESS NOTES
[facility]: [place]  Neuromuscular Progress Note    Patient Name:  Bailey Malhotra  MRN:  [id number]    :  1955  Date of Service:  Sep 23, 2021  Primary care provider:  Daniel Fortune    Brief Summary:   Ms. Malhotra is a 66 year old woman with a h/o progressive bulbar palsy who presents for ALS follow-up. Symptom onset of dysphagia in Summer 2020 along with pseudobulbar affect. Started on Riluzole in 2021.     Subjective:   She was last seen in clinic on 21. Started on Riluzole in 2021 after LFTs normalized. Radicava has not been started yet, clearing through insurance from previous denial. Is unsure if she would like to start it if she doesn't qualify for home infusions. No longer taking Nuedexta, unsure when she stopped it.     Cough: She does not have cough assist or mucinex    Breathing: No SOB at rest or with exertion. No orthopnea  Sleep: Sleeping well, no morning headaches or daytime sleepiness  Dysarthria:  Anarthric, uses iPad  Dysphagia: present, mostly with pills and liquids. States she's not choking, but does cough with eating (daughter-in-law states she chokes with eating); not much changed in the last 3 months  Sialorrhea: present, progressive. Noting thicker secretions; stopped glycopyrrolate after a few days due to constipation.  Weight: Lost about 4lbs over the last 3 months   Mobility: No difficulty  Spasticity: None  Pain/Cramps: None  ADLs: No difficulty  Depression: None  Anxiety: None  Pseudobulbar: present, on sertraline and has been helpful  Cognitive: No concerns  Life Planning: No Al now; family checks in on her                                                 ROS: A 10-point ROS was performed as per HPI.    PMH:  Past Medical History:   Diagnosis Date     Cholelithiasis      GERD (gastroesophageal reflux disease)      Past Surgical History:   Procedure Laterality Date     TUBAL LIGATION         Medications:      Current Outpatient  Medications:      aspirin (ASA) 81 MG chewable tablet, Take 81 mg by mouth daily, Disp: , Rfl:      calcium carbonate 600 mg-vitamin D 400 units (CALTRATE) 600-400 MG-UNIT per tablet, Take 1 tablet by mouth daily, Disp: , Rfl:      diphenhydrAMINE-acetaminophen (TYLENOL PM)  MG tablet, Take 2 tablets by mouth daily, Disp: , Rfl:      glycopyrrolate (ROBINUL) 1 MG tablet, Take 1 tablet (1 mg) by mouth 3 times daily, Disp: 270 tablet, Rfl: 1     multivitamin, therapeutic with minerals (THERA-VIT-M) TABS tablet, Take 1 tablet by mouth daily, Disp: , Rfl:      NUEDEXTA 20-10 MG capsule, Take 1 capsule by mouth See Admin Instructions, Disp: , Rfl:      riluzole (RILUTEK) 50 MG tablet, Take 1 tablet (50 mg) by mouth every 12 hours, Disp: 60 tablet, Rfl: 2     riluzole (RILUTEK) 50 MG tablet, Take 1 tablet (50 mg) by mouth every 12 hours, Disp: 60 tablet, Rfl: 3     sertraline (ZOLOFT) 50 MG tablet, Take 1.5 Tablets daily, Disp: 45 tablet, Rfl: 4     famotidine (PEPCID) 20 MG tablet, , Disp: , Rfl:      LANsoprazole (PREVACID) 30 MG DR capsule, , Disp: , Rfl:   Not taking Nudexta or glycopyrolate    Physical Examination:   BP (!) 152/87   Pulse 69   Resp 16   Wt 66.4 kg (146 lb 6.4 oz)   LMP  (LMP Unknown)   SpO2 96%   BMI 25.13 kg/m    Wt Readings from Last 4 Encounters:   12/30/21 66.4 kg (146 lb 6.4 oz)   09/23/21 68.2 kg (150 lb 6.4 oz)   06/17/21 70.3 kg (155 lb)   06/08/21 70.3 kg (155 lb)     General: pt sitting comfortably in chair, breathing easily on ra, in NAD   -MS: alert, answering questions appropriately    NM Exam: Cranial     Atrophy Fasciculations   Tongue: Positive Positive      Facial weakness: moderate/severe  Tongue movements: slow  Tongue weakness: moderate/severe  Jaw jerk: present  Speech: anarthric    NM Exam: Axial    Neck flexion weakness: moderate  Neck extension weakness: none    NM Exam: Upper Extremities     Right Left   Atrophy: Negative Negative   Fasciculations: Negative  Negative   Muscle tone: normal normal   Rapid alt. movements:     Reflexes Right Left   Biceps: increased increased   Brachioradialis: increased increased   Triceps: increased increased   Braga: right Braga reflex present left Braga reflex present   Strength Right Left   * Indicates a recommended field     Shoulder abduction*: >4 *arthritis 5   Elbow flexion: 4+ 5   Elbow extension*: 4+ 4+   Wrist extension*: 5 5   Finger extension: 4- 4   Finger flexion:     Abductor pollicis brevis: 4 4+   First dorsal interosseous*: 4+ 5   Abductor digiti minimi:         NM Exam: Lower Extremities     Right Left   Atrophy: Negative Negative   Fasciculations: Negative Negative   Muscle tone: normal normal   Rapid alt. movements:     Reflexes Right Left   Patellar: increased increased   Achilles: normal normal   Plantar:     Strength Right Left   * Indicates a recommended field     Hip flexion*: 4 4+   Knee extension*: 5 5   Knee flexion: 5 5   Ankle dorsiflexion*: 5 5   Ankle plantar flexion: 5 5   Normal gait: yes, able to walk on heels     Investigations:    PFTs    4/7/2021: FVC 69%, MEP 36, MIP -35   9/23/2021: FVC 62% MEP 34 MIP -33   12/30/2021: FVC 36% MEP 31 MIP -25  Lab:    LFTs: 6/17/21 ALT 34 AST 24 Alk Phos 120   LFTs: 9/23/21: ALT 52 AST 38 Alk Phos 163    Impression:  Ms. Malhotra is a 66 year old woman with a h/o progressive bulbar palsy who presents for ALS follow-up.     1) Progressive bulbar palsy  Symptom onset of dysphagia in Summer 2020 along with pseudobulbar affect.  Overall symptoms are mostly isolated to the bulbar region with dysarthria and dysphagia but with some more recent weakness in the bilateral upper extremities, which has progressed slightly since last visit. Continued riluzole and doing well, due for LFTs today. Slight increase in LFTs in September, will be important to track going forward.  Given her dysarthria and dysphagia she will also meet with our speech therapist to assess swallow  function. We also reviewed indications for gastrostomy, discussed concern and safety for swallowing. She states she would like to wait for gtube placement. Clearly stated that she would like a gtube eventually, agreed to get on the schedule for 3 months from now so that a date is present. Discussed safety factors of swallowing/choking, concerns for waiting (weight loss/breathing concerns). Discussed indications and recommendation for BiPAP use, would like to wait at this time.  For secretions she failed glycopyrolate, discussed trailing atropine drops at this time. Would not recommend botox currently given still eating by mouth and concern for worsening weakness. Will report efficacy. Doing well on Sertraline, recommend continuting. Has never had genetic testing completed, will discuss briefly with our genetic counselor today. All questions answered    Recommendations:   - LFTs today  - Continue Riluzole  - Continue Sertraline  - Start atropine drops TID PRN  - genetic counselor today  - SLP today  - PFTs today  - gastrostomy tube placement scheduled for 3 months  - RTC 3 mo, will touch base with dietician in 4-6 weeks to re-assess swallow function    Seen and discussed with Dr. Perkins. His addendum follows    Silvina Shoemaker MD  Neuromuscular Fellow    I personally examined the patient and concur with the resident's note. Dr Shoemaker and I explained in detail the indications and rationale for gastrostomy, the rationale and evidence base for NIV, and the rationale for genetic testing, including possible eligibility for gene-specific clinical research or expanded access programs. Ms Malhotra chooses to proceed with gastrostomy within 3 months, and we will arrange a check-in by telephone before then. She has concerning FVC but denies any symptoms of ventilatory disturbance awake or asleep, but may need to be evaluated for surgical gastrostomy rather than IR if symptoms worsen.     Phi Perkins M.D.

## 2021-12-30 NOTE — PROGRESS NOTES
Outpatient Speech Language Pathology Neurology Clinic Evaluation  Bailey Malhotra YOB: 1955 [id number]    Visit Date: 12/30/21    Age: 66 year old    Medical Diagnosis: Amyotrophic lateral sclerosis (ALS)    Date of Diagnosis: Initial PBP 4/7/21, bulbar onset ALS 6/17/21    Referring MD: Dr Perkins     present: No    PMH: Refer to Medical Chart    Fall Risk:Refer to physician report    Others at Clinic Visit: daughter-in-law Audrey    Living Situation: Alone (no longer living with son and grandson)    Patient Concerns/Goals: Increased swallowing difficulty, Augmentative / Alternative communication needs    Observations: Patient and DIL are pleasant and cooperative. DIL and family are strongly encouraging her to get a feeding tube but she does not feel she is ready yet.    Current Mode of Nutrition: Oral diet of regular solids and thin liquids, she denies changes in diet despite recommendations last visit and severe oral motor deficits.    Weight: 146lbs (159lbs at dx 4/7/21). She is happy to have lost more weight and is concerned getting a feeding tube will make her gain weight - RD to address    Cardio-Respiratory Status: Forced vital capacity: 36% (question lip seal)    Functional Rating Scale (ALS-FRS): 37/48      Oral Motor/Swallowing  Oral Motor Function: Anomalies present:  Mandibular anomaly- none  Labial anomaly- ROM (severe), Strength (severe), Rate (severe)  Lingual anomaly- ROM (severe), Strength (severe), and Rate (severe)    Volitional Abilities:  Cough- Weak  Throat Clear- Not functional  Swallow- Present    Swallow Function:   Thin Liquid-  Significant anterior spillage (approximately half of bolus), severely reduced bolus prep/control and A-P propulsion, suspected premature spillage, moderately reduced laryngeal elevation, multiple swallows to pass bolus. No overt s/s aspiration however wet/gurgling breathing noted throughout sesssion.  Nectar Thick Liquid- not assessed at  patient request, detailed education again provided  Regular Solid-  Increased mastication time, anterior loss of pieces, finger sweep needed to maneuver solids for mastication, severely reduced bolus prep/control and A-P propulsion with moderately reduced laryngeal elevation and multiple swallows required but no overt s/s aspiration.    Dysphagia Diagnosis: Moderate pharyngeal, severe oral dysphagia    Dysphagia Intervention: SLP educated in swallowing anatomy and physiology including aspiration and possible respiratory compromise from aspiration. Trained safe swallow strategies to reduce aspiration risks (including slow rate of intake, small bites/sips, chin tuck/neutral head position, pills in purees (she takes in cool whip), mindful swallowing) Educated in role of oral cares in respiratory health with increased aspiration risks. Also trained diet modifications to reduce risk of aspiration and ease intake (including pureed solids and thickened liquids). SLP educated in thickened liquids including rationale and possible benefit in reducing aspiration. Provided training in thickening options including powders, gels, pre-thickened liquids, and naturally thick liquids (shakes, smoothies, etc). Detailed education in gastrostomy in collaboration with RD including benefit due to high risk of aspiration and increased secretion with PO intake which she find bothersome. Family and team have strongly encouraged her to pursue but she wishes to wait 3 months and indicates she does not want to gain weight. She also had misconception that she would no longer be able to eat by mouth - this was discussed in great detail and she verbalized understanding that PO intake for pleasure is still an option after gastrostomy. Family asked if procedure could be scheduled for the future to have a date set, will collaborate with coordinator. Also plan to have dietician reconnect in 4-6 weeks to assess further weight loss and answer further  questions regarding tube placement/feedings.      Speech Intelligibility/Functional Communication   Methods of communication: Augmentative and alternative communication (AAC)    Dysarthria: Profound/anarthria    Speech: Anarthria- no functional speech    Speech Intelligibility/Communication:Impacts daily activities, Impacts social activities, Impacts phone usage and Impacts ability to communicate basic wants/needs    Augmentative and Alternative Communication (AAC):   Has Boogie Board and iPad with Speech Assistant. She uses iPad proficiently throughout session including keyboard and saved messages. She has numerous lengthy saved messages and is observed to struggle to find the one she wants to select. SLP instructed in option to use Labels on messages to reduce amount of information seen on homepage - she demonstrates understanding after education.      Clinical Impression/Plan of Care  Treatment Diagnosis: Oropharyngeal Dysphagia and Anarthria     Recommendations:  Oral diet of pureed solids and nectar thick liquids  Strongly consider gastrostomy ASAP  Reconsult with RD in 4-6 weeks  Continue use of safe swallow strategies  Continue use of AAC    Plan of Care:  1 session evaluation and treatment.    Goals: Based on today's evaluation session patient and/or caregiver will have understanding of current communication and/or swallowing status and recommendations for management.    Educational Assessment:  Learners- Patient and Daughter-in-Law  Barriers to Learning- No barriers.    Education provided/response:   Speech  Swallowing  AAC  Diet  Verbalized understanding    Treatment provided this date:   Swallow intervention, 24 minutes (see above for details)  Communication intervention, 0 minutes    Response to recommendations/treatment: verbalized understanding, asked appropriate questions, agreed to recommendations    Goal attainment: All goals met    Risks and benefits of evaluation/treatment have been  explained.  Patient, family and/or caregiver are in agreement with Plan of Care.    Timed Code Treatment Minutes: 0 minutes of timed codes    Total Treatment Time (sum of timed and untimed services): 44 minutes

## 2021-12-30 NOTE — NURSING NOTE
Chief Complaint   Patient presents with     RECHECK     RETURN ALS/MOTOR NEURON     Shahram Jennings

## 2021-12-30 NOTE — TELEPHONE ENCOUNTER
Briefly introduced myself to Bailey as a Genetic Counselor. Quick overview of genetics of ALS and rationale for genetic testing. Bailey is not interested in genetic testing at this time, but would like this to be re-visited at next ALS clinic visit.    Riya Mejía MS [facility]  Genetic Counselor  Email: [email or web address]  Phone: [phone]  Pager: [phone]

## 2021-12-30 NOTE — PROGRESS NOTES
Briefly met with Bailey as part of her multidisciplinary ALS clinic. Introduced myself as a Genetic Counselor. Briefly reviewed with family the genetics of ALS and process for genetic testing. Bailey would like to consider genetic testing more. She would like to revisit genetics at future ALS clinic visits. Provided contact information in the event that Bailey would like to pursue genetic testing sooner.    Discussed with Bailey that we would need to gather additional information, review her health history, and discuss additional information as it relates to process for genetic testing prior to ordering any testing.    Riya Mejía MS [facility]  Genetic Counselor  Email: [email or web address]  Phone: [phone]  Pager: [phone]

## 2022-01-01 ENCOUNTER — APPOINTMENT (OUTPATIENT)
Dept: RADIOLOGY | Facility: HOSPITAL | Age: 67
DRG: 391 | End: 2022-01-01
Attending: STUDENT IN AN ORGANIZED HEALTH CARE EDUCATION/TRAINING PROGRAM
Payer: COMMERCIAL

## 2022-01-01 ENCOUNTER — PATIENT OUTREACH (OUTPATIENT)
Dept: CARE COORDINATION | Facility: CLINIC | Age: 67
End: 2022-01-01

## 2022-01-01 ENCOUNTER — APPOINTMENT (OUTPATIENT)
Dept: PHYSICAL THERAPY | Facility: HOSPITAL | Age: 67
DRG: 391 | End: 2022-01-01
Payer: COMMERCIAL

## 2022-01-01 ENCOUNTER — HOME INFUSION (PRE-WILLOW HOME INFUSION) (OUTPATIENT)
Dept: PHARMACY | Facility: CLINIC | Age: 67
End: 2022-01-01

## 2022-01-01 ENCOUNTER — TELEPHONE (OUTPATIENT)
Dept: NEUROLOGY | Facility: CLINIC | Age: 67
End: 2022-01-01
Payer: COMMERCIAL

## 2022-01-01 ENCOUNTER — THERAPY VISIT (OUTPATIENT)
Dept: PHYSICAL THERAPY | Facility: CLINIC | Age: 67
End: 2022-01-01
Payer: COMMERCIAL

## 2022-01-01 ENCOUNTER — PATIENT OUTREACH (OUTPATIENT)
Dept: CARE COORDINATION | Facility: CLINIC | Age: 67
End: 2022-01-01
Payer: COMMERCIAL

## 2022-01-01 ENCOUNTER — APPOINTMENT (OUTPATIENT)
Dept: SPEECH THERAPY | Facility: HOSPITAL | Age: 67
DRG: 391 | End: 2022-01-01
Attending: STUDENT IN AN ORGANIZED HEALTH CARE EDUCATION/TRAINING PROGRAM
Payer: COMMERCIAL

## 2022-01-01 ENCOUNTER — HEALTH MAINTENANCE LETTER (OUTPATIENT)
Age: 67
End: 2022-01-01

## 2022-01-01 ENCOUNTER — ALLIED HEALTH/NURSE VISIT (OUTPATIENT)
Dept: NEUROLOGY | Facility: CLINIC | Age: 67
End: 2022-01-01

## 2022-01-01 ENCOUNTER — APPOINTMENT (OUTPATIENT)
Dept: PHYSICAL THERAPY | Facility: HOSPITAL | Age: 67
DRG: 391 | End: 2022-01-01
Attending: STUDENT IN AN ORGANIZED HEALTH CARE EDUCATION/TRAINING PROGRAM
Payer: COMMERCIAL

## 2022-01-01 ENCOUNTER — APPOINTMENT (OUTPATIENT)
Dept: CT IMAGING | Facility: CLINIC | Age: 67
End: 2022-01-01
Attending: STUDENT IN AN ORGANIZED HEALTH CARE EDUCATION/TRAINING PROGRAM
Payer: COMMERCIAL

## 2022-01-01 ENCOUNTER — THERAPY VISIT (OUTPATIENT)
Dept: SPEECH THERAPY | Facility: CLINIC | Age: 67
End: 2022-01-01
Payer: COMMERCIAL

## 2022-01-01 ENCOUNTER — HOSPITAL ENCOUNTER (EMERGENCY)
Facility: HOSPITAL | Age: 67
Discharge: HOME | End: 2022-11-26
Attending: EMERGENCY MEDICINE | Admitting: EMERGENCY MEDICINE
Payer: COMMERCIAL

## 2022-01-01 ENCOUNTER — TELEPHONE (OUTPATIENT)
Dept: NEUROLOGY | Facility: CLINIC | Age: 67
End: 2022-01-01

## 2022-01-01 ENCOUNTER — APPOINTMENT (OUTPATIENT)
Dept: INTERVENTIONAL RADIOLOGY/VASCULAR | Facility: HOSPITAL | Age: 67
End: 2022-01-01
Attending: RADIOLOGY
Payer: COMMERCIAL

## 2022-01-01 ENCOUNTER — HOME INFUSION (PRE-WILLOW HOME INFUSION) (OUTPATIENT)
Dept: PHARMACY | Facility: CLINIC | Age: 67
End: 2022-01-01
Payer: COMMERCIAL

## 2022-01-01 ENCOUNTER — NURSE TRIAGE (OUTPATIENT)
Dept: NURSING | Facility: CLINIC | Age: 67
End: 2022-01-01

## 2022-01-01 ENCOUNTER — HOSPITAL ENCOUNTER (INPATIENT)
Facility: HOSPITAL | Age: 67
LOS: 6 days | Discharge: HOME | DRG: 391 | End: 2022-05-12
Attending: EMERGENCY MEDICINE | Admitting: STUDENT IN AN ORGANIZED HEALTH CARE EDUCATION/TRAINING PROGRAM
Payer: COMMERCIAL

## 2022-01-01 ENCOUNTER — HOSPITAL ENCOUNTER (EMERGENCY)
Facility: HOSPITAL | Age: 67
Discharge: HOME | End: 2022-07-27
Attending: EMERGENCY MEDICINE | Admitting: EMERGENCY MEDICINE
Payer: COMMERCIAL

## 2022-01-01 ENCOUNTER — HOSPITAL ENCOUNTER (EMERGENCY)
Facility: HOSPITAL | Age: 67
Discharge: HOME | End: 2022-11-28
Attending: EMERGENCY MEDICINE | Admitting: EMERGENCY MEDICINE
Payer: COMMERCIAL

## 2022-01-01 ENCOUNTER — TELEPHONE (OUTPATIENT)
Dept: NURSING | Facility: CLINIC | Age: 67
End: 2022-01-01

## 2022-01-01 ENCOUNTER — OFFICE VISIT (OUTPATIENT)
Dept: NEUROLOGY | Facility: CLINIC | Age: 67
End: 2022-01-01
Payer: COMMERCIAL

## 2022-01-01 ENCOUNTER — APPOINTMENT (OUTPATIENT)
Dept: RADIOLOGY | Facility: CLINIC | Age: 67
End: 2022-01-01
Attending: STUDENT IN AN ORGANIZED HEALTH CARE EDUCATION/TRAINING PROGRAM
Payer: COMMERCIAL

## 2022-01-01 ENCOUNTER — HOSPITAL ENCOUNTER (EMERGENCY)
Facility: CLINIC | Age: 67
Discharge: ANOTHER HOSPITAL | End: 2022-11-26
Attending: STUDENT IN AN ORGANIZED HEALTH CARE EDUCATION/TRAINING PROGRAM | Admitting: STUDENT IN AN ORGANIZED HEALTH CARE EDUCATION/TRAINING PROGRAM
Payer: COMMERCIAL

## 2022-01-01 ENCOUNTER — THERAPY VISIT (OUTPATIENT)
Dept: OCCUPATIONAL THERAPY | Facility: CLINIC | Age: 67
End: 2022-01-01
Payer: COMMERCIAL

## 2022-01-01 ENCOUNTER — VIRTUAL VISIT (OUTPATIENT)
Dept: NEUROLOGY | Facility: CLINIC | Age: 67
End: 2022-01-01
Payer: COMMERCIAL

## 2022-01-01 ENCOUNTER — APPOINTMENT (OUTPATIENT)
Dept: OCCUPATIONAL THERAPY | Facility: HOSPITAL | Age: 67
DRG: 391 | End: 2022-01-01
Attending: STUDENT IN AN ORGANIZED HEALTH CARE EDUCATION/TRAINING PROGRAM
Payer: COMMERCIAL

## 2022-01-01 ENCOUNTER — APPOINTMENT (OUTPATIENT)
Dept: SPEECH THERAPY | Facility: HOSPITAL | Age: 67
DRG: 391 | End: 2022-01-01
Payer: COMMERCIAL

## 2022-01-01 ENCOUNTER — DOCUMENTATION ONLY (OUTPATIENT)
Dept: OTHER | Facility: CLINIC | Age: 67
End: 2022-01-01
Payer: COMMERCIAL

## 2022-01-01 ENCOUNTER — NURSE TRIAGE (OUTPATIENT)
Dept: NURSING | Facility: CLINIC | Age: 67
End: 2022-01-01
Payer: COMMERCIAL

## 2022-01-01 ENCOUNTER — APPOINTMENT (OUTPATIENT)
Dept: INTERVENTIONAL RADIOLOGY/VASCULAR | Facility: HOSPITAL | Age: 67
DRG: 391 | End: 2022-01-01
Attending: RADIOLOGY
Payer: COMMERCIAL

## 2022-01-01 VITALS
DIASTOLIC BLOOD PRESSURE: 73 MMHG | SYSTOLIC BLOOD PRESSURE: 136 MMHG | HEIGHT: 64 IN | TEMPERATURE: 97.9 F | HEART RATE: 78 BPM | OXYGEN SATURATION: 95 % | RESPIRATION RATE: 18 BRPM | BODY MASS INDEX: 20.78 KG/M2 | WEIGHT: 121.7 LBS

## 2022-01-01 VITALS
SYSTOLIC BLOOD PRESSURE: 147 MMHG | BODY MASS INDEX: 20.77 KG/M2 | OXYGEN SATURATION: 97 % | DIASTOLIC BLOOD PRESSURE: 73 MMHG | RESPIRATION RATE: 18 BRPM | TEMPERATURE: 98.4 F | HEART RATE: 66 BPM | WEIGHT: 121 LBS

## 2022-01-01 VITALS
HEART RATE: 94 BPM | BODY MASS INDEX: 19.21 KG/M2 | RESPIRATION RATE: 22 BRPM | WEIGHT: 119 LBS | DIASTOLIC BLOOD PRESSURE: 86 MMHG | OXYGEN SATURATION: 96 % | TEMPERATURE: 99.2 F | SYSTOLIC BLOOD PRESSURE: 146 MMHG

## 2022-01-01 VITALS
DIASTOLIC BLOOD PRESSURE: 91 MMHG | HEART RATE: 80 BPM | TEMPERATURE: 97.3 F | SYSTOLIC BLOOD PRESSURE: 151 MMHG | HEIGHT: 66 IN | RESPIRATION RATE: 18 BRPM | OXYGEN SATURATION: 93 % | WEIGHT: 120 LBS | BODY MASS INDEX: 19.29 KG/M2

## 2022-01-01 VITALS
TEMPERATURE: 98.6 F | WEIGHT: 119.93 LBS | OXYGEN SATURATION: 95 % | RESPIRATION RATE: 16 BRPM | HEART RATE: 81 BPM | DIASTOLIC BLOOD PRESSURE: 75 MMHG | BODY MASS INDEX: 19.36 KG/M2 | SYSTOLIC BLOOD PRESSURE: 158 MMHG

## 2022-01-01 VITALS
RESPIRATION RATE: 17 BRPM | SYSTOLIC BLOOD PRESSURE: 144 MMHG | WEIGHT: 126 LBS | OXYGEN SATURATION: 96 % | BODY MASS INDEX: 21.63 KG/M2 | DIASTOLIC BLOOD PRESSURE: 96 MMHG | HEART RATE: 83 BPM

## 2022-01-01 DIAGNOSIS — G12.21 ALS (AMYOTROPHIC LATERAL SCLEROSIS) (H): ICD-10-CM

## 2022-01-01 DIAGNOSIS — T85.598A FEEDING TUBE DYSFUNCTION, INITIAL ENCOUNTER: ICD-10-CM

## 2022-01-01 DIAGNOSIS — Z78.9 IMPAIRED MOBILITY AND ADLS: ICD-10-CM

## 2022-01-01 DIAGNOSIS — E87.6 HYPOKALEMIA: ICD-10-CM

## 2022-01-01 DIAGNOSIS — K94.23 GASTROSTOMY TUBE DYSFUNCTION (H): ICD-10-CM

## 2022-01-01 DIAGNOSIS — F48.2 PBA (PSEUDOBULBAR AFFECT): ICD-10-CM

## 2022-01-01 DIAGNOSIS — G12.21 ALS (AMYOTROPHIC LATERAL SCLEROSIS) (H): Primary | ICD-10-CM

## 2022-01-01 DIAGNOSIS — Z74.09 IMPAIRED MOBILITY AND ADLS: ICD-10-CM

## 2022-01-01 DIAGNOSIS — Z78.9 ENCOUNTER FOR GASTROJEJUNAL (GJ) TUBE PLACEMENT: ICD-10-CM

## 2022-01-01 DIAGNOSIS — E83.42 HYPOMAGNESEMIA: ICD-10-CM

## 2022-01-01 DIAGNOSIS — Z51.5 ENCOUNTER FOR PALLIATIVE CARE: ICD-10-CM

## 2022-01-01 DIAGNOSIS — R10.84 ABDOMINAL PAIN, GENERALIZED: ICD-10-CM

## 2022-01-01 DIAGNOSIS — G89.29 CHRONIC PAIN OF LEFT KNEE: ICD-10-CM

## 2022-01-01 DIAGNOSIS — M62.81 GENERALIZED MUSCLE WEAKNESS: ICD-10-CM

## 2022-01-01 DIAGNOSIS — M25.562 CHRONIC PAIN OF LEFT KNEE: ICD-10-CM

## 2022-01-01 DIAGNOSIS — K94.20 PROBLEM WITH GASTROSTOMY TUBE (H): ICD-10-CM

## 2022-01-01 DIAGNOSIS — R13.12 OROPHARYNGEAL DYSPHAGIA: ICD-10-CM

## 2022-01-01 DIAGNOSIS — R63.30 FEEDING DIFFICULTIES: ICD-10-CM

## 2022-01-01 DIAGNOSIS — K11.7 SIALORRHEA: ICD-10-CM

## 2022-01-01 DIAGNOSIS — Z78.9 IMPAIRED INSTRUMENTAL ACTIVITIES OF DAILY LIVING (IADL): ICD-10-CM

## 2022-01-01 DIAGNOSIS — R47.1 DYSARTHRIA: ICD-10-CM

## 2022-01-01 DIAGNOSIS — M75.01 ADHESIVE CAPSULITIS OF RIGHT SHOULDER: Primary | ICD-10-CM

## 2022-01-01 LAB
ALBUMIN SERPL-MCNC: 3.2 G/DL (ref 3.5–5)
ALBUMIN SERPL-MCNC: 3.7 G/DL (ref 3.5–5)
ALBUMIN UR-MCNC: 70 MG/DL
ALP SERPL-CCNC: 119 U/L (ref 45–120)
ALP SERPL-CCNC: 87 U/L (ref 45–120)
ALT SERPL W P-5'-P-CCNC: 17 U/L (ref 0–45)
ALT SERPL W P-5'-P-CCNC: 38 U/L (ref 0–45)
ANION GAP SERPL CALCULATED.3IONS-SCNC: 11 MMOL/L (ref 5–18)
ANION GAP SERPL CALCULATED.3IONS-SCNC: 11 MMOL/L (ref 5–18)
ANION GAP SERPL CALCULATED.3IONS-SCNC: 12 MMOL/L (ref 5–18)
ANION GAP SERPL CALCULATED.3IONS-SCNC: 13 MMOL/L (ref 5–18)
ANION GAP SERPL CALCULATED.3IONS-SCNC: 14 MMOL/L (ref 5–18)
ANION GAP SERPL CALCULATED.3IONS-SCNC: 7 MMOL/L (ref 5–18)
ANION GAP SERPL CALCULATED.3IONS-SCNC: 9 MMOL/L (ref 5–18)
APPEARANCE UR: ABNORMAL
AST SERPL W P-5'-P-CCNC: 32 U/L (ref 0–40)
AST SERPL W P-5'-P-CCNC: 42 U/L (ref 0–40)
ATRIAL RATE - MUSE: 71 BPM
BACTERIA UR CULT: NORMAL
BILIRUB DIRECT SERPL-MCNC: 0.1 MG/DL
BILIRUB SERPL-MCNC: 0.3 MG/DL (ref 0–1)
BILIRUB SERPL-MCNC: 0.6 MG/DL (ref 0–1)
BILIRUB UR QL STRIP: NEGATIVE
BUN SERPL-MCNC: 10 MG/DL (ref 8–22)
BUN SERPL-MCNC: 17 MG/DL (ref 8–22)
BUN SERPL-MCNC: 19 MG/DL (ref 8–22)
BUN SERPL-MCNC: 3 MG/DL (ref 8–22)
BUN SERPL-MCNC: 4 MG/DL (ref 8–22)
BUN SERPL-MCNC: 6 MG/DL (ref 8–22)
BUN SERPL-MCNC: 8 MG/DL (ref 8–22)
CALCIUM SERPL-MCNC: 8.7 MG/DL (ref 8.5–10.5)
CALCIUM SERPL-MCNC: 8.8 MG/DL (ref 8.5–10.5)
CALCIUM SERPL-MCNC: 8.9 MG/DL (ref 8.5–10.5)
CALCIUM SERPL-MCNC: 9 MG/DL (ref 8.5–10.5)
CALCIUM SERPL-MCNC: 9.5 MG/DL (ref 8.5–10.5)
CALCIUM SERPL-MCNC: 9.6 MG/DL (ref 8.5–10.5)
CALCIUM SERPL-MCNC: 9.7 MG/DL (ref 8.5–10.5)
CHLORIDE BLD-SCNC: 101 MMOL/L (ref 98–107)
CHLORIDE BLD-SCNC: 102 MMOL/L (ref 98–107)
CHLORIDE BLD-SCNC: 97 MMOL/L (ref 98–107)
CHLORIDE BLD-SCNC: 98 MMOL/L (ref 98–107)
CO2 SERPL-SCNC: 19 MMOL/L (ref 22–31)
CO2 SERPL-SCNC: 24 MMOL/L (ref 22–31)
CO2 SERPL-SCNC: 24 MMOL/L (ref 22–31)
CO2 SERPL-SCNC: 27 MMOL/L (ref 22–31)
CO2 SERPL-SCNC: 27 MMOL/L (ref 22–31)
CO2 SERPL-SCNC: 28 MMOL/L (ref 22–31)
CO2 SERPL-SCNC: 29 MMOL/L (ref 22–31)
COLOR UR AUTO: YELLOW
CREAT SERPL-MCNC: 0.55 MG/DL (ref 0.6–1.1)
CREAT SERPL-MCNC: 0.55 MG/DL (ref 0.6–1.1)
CREAT SERPL-MCNC: 0.58 MG/DL (ref 0.6–1.1)
CREAT SERPL-MCNC: 0.62 MG/DL (ref 0.6–1.1)
CREAT SERPL-MCNC: 0.62 MG/DL (ref 0.6–1.1)
CREAT SERPL-MCNC: 0.64 MG/DL (ref 0.6–1.1)
CREAT SERPL-MCNC: 0.67 MG/DL (ref 0.6–1.1)
DIASTOLIC BLOOD PRESSURE - MUSE: NORMAL MMHG
ERYTHROCYTE [DISTWIDTH] IN BLOOD BY AUTOMATED COUNT: 13.5 % (ref 10–15)
ERYTHROCYTE [DISTWIDTH] IN BLOOD BY AUTOMATED COUNT: 14.7 % (ref 10–15)
ERYTHROCYTE [DISTWIDTH] IN BLOOD BY AUTOMATED COUNT: 14.8 % (ref 10–15)
ERYTHROCYTE [DISTWIDTH] IN BLOOD BY AUTOMATED COUNT: 15.4 % (ref 10–15)
EXPTIME-PRE: 1.43 SEC
FEF2575-%PRED-PRE: 36 %
FEF2575-PRE: 0.75 L/SEC
FEF2575-PRED: 2.05 L/SEC
FEFMAX-%PRED-PRE: 17 %
FEFMAX-PRE: 1.02 L/SEC
FEFMAX-PRED: 6.01 L/SEC
FEV1-%PRED-PRE: 43 %
FEV1-PRE: 1.02 L
FEV1FEV6-PRE: 84 %
FEV1FEV6-PRED: 80 %
FEV1FVC-PRE: 84 %
FEV1FVC-PRED: 79 %
FIFMAX-PRE: 1.37 L/SEC
FVC-%PRED-PRE: 40 %
FVC-PRE: 1.22 L
FVC-PRED: 3.03 L
GFR SERPL CREATININE-BSD FRML MDRD: >90 ML/MIN/1.73M2
GLUCOSE BLD-MCNC: 108 MG/DL (ref 70–125)
GLUCOSE BLD-MCNC: 112 MG/DL (ref 70–125)
GLUCOSE BLD-MCNC: 123 MG/DL (ref 70–125)
GLUCOSE BLD-MCNC: 128 MG/DL (ref 70–125)
GLUCOSE BLD-MCNC: 87 MG/DL (ref 70–125)
GLUCOSE BLD-MCNC: 96 MG/DL (ref 70–125)
GLUCOSE BLD-MCNC: 98 MG/DL (ref 70–125)
GLUCOSE BLDC GLUCOMTR-MCNC: 84 MG/DL (ref 70–99)
GLUCOSE UR STRIP-MCNC: NEGATIVE MG/DL
HCT VFR BLD AUTO: 36.4 % (ref 35–47)
HCT VFR BLD AUTO: 36.6 % (ref 35–47)
HCT VFR BLD AUTO: 38.7 % (ref 35–47)
HCT VFR BLD AUTO: 41.2 % (ref 35–47)
HGB BLD-MCNC: 12 G/DL (ref 11.7–15.7)
HGB BLD-MCNC: 12.7 G/DL (ref 11.7–15.7)
HGB BLD-MCNC: 12.9 G/DL (ref 11.7–15.7)
HGB BLD-MCNC: 13.2 G/DL (ref 11.7–15.7)
HGB UR QL STRIP: ABNORMAL
HOLD SPECIMEN: NORMAL
INR PPP: 1.01 (ref 0.9–1.15)
INTERPRETATION ECG - MUSE: NORMAL
KETONES UR STRIP-MCNC: 20 MG/DL
LEUKOCYTE ESTERASE UR QL STRIP: ABNORMAL
LIPASE SERPL-CCNC: 21 U/L (ref 0–52)
MAGNESIUM SERPL-MCNC: 1.6 MG/DL (ref 1.8–2.6)
MAGNESIUM SERPL-MCNC: 1.6 MG/DL (ref 1.8–2.6)
MAGNESIUM SERPL-MCNC: 1.7 MG/DL (ref 1.8–2.6)
MAGNESIUM SERPL-MCNC: 1.8 MG/DL (ref 1.8–2.6)
MAGNESIUM SERPL-MCNC: 1.8 MG/DL (ref 1.8–2.6)
MAGNESIUM SERPL-MCNC: 1.9 MG/DL (ref 1.8–2.6)
MCH RBC QN AUTO: 31.1 PG (ref 26.5–33)
MCH RBC QN AUTO: 31.2 PG (ref 26.5–33)
MCH RBC QN AUTO: 31.4 PG (ref 26.5–33)
MCH RBC QN AUTO: 31.5 PG (ref 26.5–33)
MCHC RBC AUTO-ENTMCNC: 31.3 G/DL (ref 31.5–36.5)
MCHC RBC AUTO-ENTMCNC: 32.8 G/DL (ref 31.5–36.5)
MCHC RBC AUTO-ENTMCNC: 34.1 G/DL (ref 31.5–36.5)
MCHC RBC AUTO-ENTMCNC: 34.9 G/DL (ref 31.5–36.5)
MCV RBC AUTO: 100 FL (ref 78–100)
MCV RBC AUTO: 90 FL (ref 78–100)
MCV RBC AUTO: 92 FL (ref 78–100)
MCV RBC AUTO: 95 FL (ref 78–100)
MEP-PRE: 13 CMH2O
MIP-PRE: -16 CMH2O
MUCOUS THREADS #/AREA URNS LPF: PRESENT /LPF
NITRATE UR QL: NEGATIVE
P AXIS - MUSE: 79 DEGREES
PH UR STRIP: 6 [PH] (ref 5–7)
PHOSPHATE SERPL-MCNC: 2 MG/DL (ref 2.5–4.5)
PHOSPHATE SERPL-MCNC: 2.2 MG/DL (ref 2.5–4.5)
PHOSPHATE SERPL-MCNC: 2.4 MG/DL (ref 2.5–4.5)
PHOSPHATE SERPL-MCNC: 2.5 MG/DL (ref 2.5–4.5)
PHOSPHATE SERPL-MCNC: 2.6 MG/DL (ref 2.5–4.5)
PHOSPHATE SERPL-MCNC: 2.8 MG/DL (ref 2.5–4.5)
PHOSPHATE SERPL-MCNC: 2.8 MG/DL (ref 2.5–4.5)
PHOSPHATE SERPL-MCNC: 2.9 MG/DL (ref 2.5–4.5)
PLATELET # BLD AUTO: 175 10E3/UL (ref 150–450)
PLATELET # BLD AUTO: 220 10E3/UL (ref 150–450)
PLATELET # BLD AUTO: 232 10E3/UL (ref 150–450)
PLATELET # BLD AUTO: 254 10E3/UL (ref 150–450)
POTASSIUM BLD-SCNC: 2.1 MMOL/L (ref 3.5–5)
POTASSIUM BLD-SCNC: 2.5 MMOL/L (ref 3.5–5)
POTASSIUM BLD-SCNC: 2.8 MMOL/L (ref 3.5–5)
POTASSIUM BLD-SCNC: 3 MMOL/L (ref 3.5–5)
POTASSIUM BLD-SCNC: 3 MMOL/L (ref 3.5–5)
POTASSIUM BLD-SCNC: 3.1 MMOL/L (ref 3.5–5)
POTASSIUM BLD-SCNC: 3.4 MMOL/L (ref 3.5–5)
POTASSIUM BLD-SCNC: 3.5 MMOL/L (ref 3.5–5)
POTASSIUM BLD-SCNC: 3.6 MMOL/L (ref 3.5–5)
POTASSIUM BLD-SCNC: 3.7 MMOL/L (ref 3.5–5)
POTASSIUM BLD-SCNC: 4 MMOL/L (ref 3.5–5)
POTASSIUM BLD-SCNC: 4.3 MMOL/L (ref 3.5–5)
POTASSIUM BLD-SCNC: 4.4 MMOL/L (ref 3.5–5)
PR INTERVAL - MUSE: 116 MS
PROT SERPL-MCNC: 7.1 G/DL (ref 6–8)
PROT SERPL-MCNC: 7.7 G/DL (ref 6–8)
QRS DURATION - MUSE: 94 MS
QT - MUSE: 444 MS
QTC - MUSE: 482 MS
R AXIS - MUSE: 51 DEGREES
RBC # BLD AUTO: 3.86 10E6/UL (ref 3.8–5.2)
RBC # BLD AUTO: 4.03 10E6/UL (ref 3.8–5.2)
RBC # BLD AUTO: 4.14 10E6/UL (ref 3.8–5.2)
RBC # BLD AUTO: 4.2 10E6/UL (ref 3.8–5.2)
RBC URINE: 14 /HPF
SARS-COV-2 RNA RESP QL NAA+PROBE: NEGATIVE
SODIUM SERPL-SCNC: 132 MMOL/L (ref 136–145)
SODIUM SERPL-SCNC: 133 MMOL/L (ref 136–145)
SODIUM SERPL-SCNC: 136 MMOL/L (ref 136–145)
SODIUM SERPL-SCNC: 137 MMOL/L (ref 136–145)
SODIUM SERPL-SCNC: 138 MMOL/L (ref 136–145)
SODIUM SERPL-SCNC: 138 MMOL/L (ref 136–145)
SODIUM SERPL-SCNC: 142 MMOL/L (ref 136–145)
SP GR UR STRIP: 1.03 (ref 1–1.03)
SQUAMOUS EPITHELIAL: 9 /HPF
SYSTOLIC BLOOD PRESSURE - MUSE: NORMAL MMHG
T AXIS - MUSE: 37 DEGREES
UROBILINOGEN UR STRIP-MCNC: 4 MG/DL
VENTRICULAR RATE- MUSE: 71 BPM
WBC # BLD AUTO: 4.8 10E3/UL (ref 4–11)
WBC # BLD AUTO: 5 10E3/UL (ref 4–11)
WBC # BLD AUTO: 5.5 10E3/UL (ref 4–11)
WBC # BLD AUTO: 8.2 10E3/UL (ref 4–11)
WBC URINE: 25 /HPF

## 2022-01-01 PROCEDURE — 250N000011 HC RX IP 250 OP 636: Performed by: EMERGENCY MEDICINE

## 2022-01-01 PROCEDURE — 250N000011 HC RX IP 250 OP 636: Performed by: HOSPITALIST

## 2022-01-01 PROCEDURE — 83735 ASSAY OF MAGNESIUM: CPT | Performed by: FAMILY MEDICINE

## 2022-01-01 PROCEDURE — 97530 THERAPEUTIC ACTIVITIES: CPT | Mod: GP

## 2022-01-01 PROCEDURE — 250N000011 HC RX IP 250 OP 636: Performed by: NURSE PRACTITIONER

## 2022-01-01 PROCEDURE — C9113 INJ PANTOPRAZOLE SODIUM, VIA: HCPCS | Performed by: HOSPITALIST

## 2022-01-01 PROCEDURE — 250N000011 HC RX IP 250 OP 636: Performed by: STUDENT IN AN ORGANIZED HEALTH CARE EDUCATION/TRAINING PROGRAM

## 2022-01-01 PROCEDURE — 83690 ASSAY OF LIPASE: CPT | Performed by: STUDENT IN AN ORGANIZED HEALTH CARE EDUCATION/TRAINING PROGRAM

## 2022-01-01 PROCEDURE — 92611 MOTION FLUOROSCOPY/SWALLOW: CPT | Mod: GN | Performed by: SPEECH-LANGUAGE PATHOLOGIST

## 2022-01-01 PROCEDURE — 36415 COLL VENOUS BLD VENIPUNCTURE: CPT | Performed by: STUDENT IN AN ORGANIZED HEALTH CARE EDUCATION/TRAINING PROGRAM

## 2022-01-01 PROCEDURE — 272N000151 HC KIT CR11

## 2022-01-01 PROCEDURE — 250N000013 HC RX MED GY IP 250 OP 250 PS 637: Performed by: STUDENT IN AN ORGANIZED HEALTH CARE EDUCATION/TRAINING PROGRAM

## 2022-01-01 PROCEDURE — 83735 ASSAY OF MAGNESIUM: CPT | Performed by: STUDENT IN AN ORGANIZED HEALTH CARE EDUCATION/TRAINING PROGRAM

## 2022-01-01 PROCEDURE — 96361 HYDRATE IV INFUSION ADD-ON: CPT

## 2022-01-01 PROCEDURE — 84100 ASSAY OF PHOSPHORUS: CPT | Performed by: FAMILY MEDICINE

## 2022-01-01 PROCEDURE — 36415 COLL VENOUS BLD VENIPUNCTURE: CPT | Performed by: HOSPITALIST

## 2022-01-01 PROCEDURE — 120N000001 HC R&B MED SURG/OB

## 2022-01-01 PROCEDURE — 84100 ASSAY OF PHOSPHORUS: CPT | Performed by: STUDENT IN AN ORGANIZED HEALTH CARE EDUCATION/TRAINING PROGRAM

## 2022-01-01 PROCEDURE — 96374 THER/PROPH/DIAG INJ IV PUSH: CPT

## 2022-01-01 PROCEDURE — 97162 PT EVAL MOD COMPLEX 30 MIN: CPT | Mod: GP | Performed by: PHYSICAL THERAPIST

## 2022-01-01 PROCEDURE — 99221 1ST HOSP IP/OBS SF/LOW 40: CPT | Performed by: CLINICAL NURSE SPECIALIST

## 2022-01-01 PROCEDURE — 82310 ASSAY OF CALCIUM: CPT | Performed by: STUDENT IN AN ORGANIZED HEALTH CARE EDUCATION/TRAINING PROGRAM

## 2022-01-01 PROCEDURE — 84132 ASSAY OF SERUM POTASSIUM: CPT | Performed by: STUDENT IN AN ORGANIZED HEALTH CARE EDUCATION/TRAINING PROGRAM

## 2022-01-01 PROCEDURE — 93005 ELECTROCARDIOGRAM TRACING: CPT | Performed by: EMERGENCY MEDICINE

## 2022-01-01 PROCEDURE — 99239 HOSP IP/OBS DSCHRG MGMT >30: CPT | Performed by: FAMILY MEDICINE

## 2022-01-01 PROCEDURE — 92526 ORAL FUNCTION THERAPY: CPT | Mod: GN | Performed by: SPEECH-LANGUAGE PATHOLOGIST

## 2022-01-01 PROCEDURE — 99222 1ST HOSP IP/OBS MODERATE 55: CPT | Performed by: PSYCHIATRY & NEUROLOGY

## 2022-01-01 PROCEDURE — 99233 SBSQ HOSP IP/OBS HIGH 50: CPT | Performed by: FAMILY MEDICINE

## 2022-01-01 PROCEDURE — 99232 SBSQ HOSP IP/OBS MODERATE 35: CPT | Performed by: STUDENT IN AN ORGANIZED HEALTH CARE EDUCATION/TRAINING PROGRAM

## 2022-01-01 PROCEDURE — 99285 EMERGENCY DEPT VISIT HI MDM: CPT | Mod: 25

## 2022-01-01 PROCEDURE — 85027 COMPLETE CBC AUTOMATED: CPT | Performed by: STUDENT IN AN ORGANIZED HEALTH CARE EDUCATION/TRAINING PROGRAM

## 2022-01-01 PROCEDURE — 97166 OT EVAL MOD COMPLEX 45 MIN: CPT | Mod: GO

## 2022-01-01 PROCEDURE — 258N000003 HC RX IP 258 OP 636: Performed by: HOSPITALIST

## 2022-01-01 PROCEDURE — 258N000003 HC RX IP 258 OP 636: Performed by: STUDENT IN AN ORGANIZED HEALTH CARE EDUCATION/TRAINING PROGRAM

## 2022-01-01 PROCEDURE — 97116 GAIT TRAINING THERAPY: CPT | Mod: GP

## 2022-01-01 PROCEDURE — 36415 COLL VENOUS BLD VENIPUNCTURE: CPT | Performed by: NURSE PRACTITIONER

## 2022-01-01 PROCEDURE — 80048 BASIC METABOLIC PNL TOTAL CA: CPT | Performed by: FAMILY MEDICINE

## 2022-01-01 PROCEDURE — 999N000127 HC STATISTIC PERIPHERAL IV START W US GUIDANCE

## 2022-01-01 PROCEDURE — 255N000002 HC RX 255 OP 636: Performed by: RADIOLOGY

## 2022-01-01 PROCEDURE — 84100 ASSAY OF PHOSPHORUS: CPT | Performed by: HOSPITALIST

## 2022-01-01 PROCEDURE — 99282 EMERGENCY DEPT VISIT SF MDM: CPT

## 2022-01-01 PROCEDURE — 250N000013 HC RX MED GY IP 250 OP 250 PS 637: Performed by: EMERGENCY MEDICINE

## 2022-01-01 PROCEDURE — 96366 THER/PROPH/DIAG IV INF ADDON: CPT

## 2022-01-01 PROCEDURE — 85610 PROTHROMBIN TIME: CPT | Performed by: NURSE PRACTITIONER

## 2022-01-01 PROCEDURE — 250N000013 HC RX MED GY IP 250 OP 250 PS 637: Performed by: HOSPITALIST

## 2022-01-01 PROCEDURE — 84132 ASSAY OF SERUM POTASSIUM: CPT | Performed by: HOSPITALIST

## 2022-01-01 PROCEDURE — 255N000002 HC RX 255 OP 636: Performed by: STUDENT IN AN ORGANIZED HEALTH CARE EDUCATION/TRAINING PROGRAM

## 2022-01-01 PROCEDURE — 36415 COLL VENOUS BLD VENIPUNCTURE: CPT | Performed by: FAMILY MEDICINE

## 2022-01-01 PROCEDURE — 99232 SBSQ HOSP IP/OBS MODERATE 35: CPT | Performed by: PSYCHIATRY & NEUROLOGY

## 2022-01-01 PROCEDURE — 99214 OFFICE O/P EST MOD 30 MIN: CPT | Mod: 95 | Performed by: PSYCHIATRY & NEUROLOGY

## 2022-01-01 PROCEDURE — 97110 THERAPEUTIC EXERCISES: CPT | Mod: GP

## 2022-01-01 PROCEDURE — 99232 SBSQ HOSP IP/OBS MODERATE 35: CPT | Performed by: NURSE PRACTITIONER

## 2022-01-01 PROCEDURE — 250N000009 HC RX 250: Performed by: FAMILY MEDICINE

## 2022-01-01 PROCEDURE — 85027 COMPLETE CBC AUTOMATED: CPT | Performed by: FAMILY MEDICINE

## 2022-01-01 PROCEDURE — C1769 GUIDE WIRE: HCPCS

## 2022-01-01 PROCEDURE — 99222 1ST HOSP IP/OBS MODERATE 55: CPT | Mod: AI | Performed by: STUDENT IN AN ORGANIZED HEALTH CARE EDUCATION/TRAINING PROGRAM

## 2022-01-01 PROCEDURE — 84132 ASSAY OF SERUM POTASSIUM: CPT | Performed by: FAMILY MEDICINE

## 2022-01-01 PROCEDURE — 258N000003 HC RX IP 258 OP 636: Performed by: FAMILY MEDICINE

## 2022-01-01 PROCEDURE — 92610 EVALUATE SWALLOWING FUNCTION: CPT | Mod: GN | Performed by: SPEECH-LANGUAGE PATHOLOGIST

## 2022-01-01 PROCEDURE — 258N000003 HC RX IP 258 OP 636: Performed by: EMERGENCY MEDICINE

## 2022-01-01 PROCEDURE — 80048 BASIC METABOLIC PNL TOTAL CA: CPT | Performed by: STUDENT IN AN ORGANIZED HEALTH CARE EDUCATION/TRAINING PROGRAM

## 2022-01-01 PROCEDURE — 36415 COLL VENOUS BLD VENIPUNCTURE: CPT | Performed by: EMERGENCY MEDICINE

## 2022-01-01 PROCEDURE — 74230 X-RAY XM SWLNG FUNCJ C+: CPT

## 2022-01-01 PROCEDURE — 49446 CHANGE G-TUBE TO G-J PERC: CPT

## 2022-01-01 PROCEDURE — 0DH63UZ INSERTION OF FEEDING DEVICE INTO STOMACH, PERCUTANEOUS APPROACH: ICD-10-PCS | Performed by: RADIOLOGY

## 2022-01-01 PROCEDURE — 250N000013 HC RX MED GY IP 250 OP 250 PS 637: Performed by: FAMILY MEDICINE

## 2022-01-01 PROCEDURE — 999N000104 HC STATISTIC NO CHARGE

## 2022-01-01 PROCEDURE — 97161 PT EVAL LOW COMPLEX 20 MIN: CPT | Mod: GP | Performed by: PHYSICAL THERAPIST

## 2022-01-01 PROCEDURE — 97535 SELF CARE MNGMENT TRAINING: CPT | Mod: GO | Performed by: OCCUPATIONAL THERAPIST

## 2022-01-01 PROCEDURE — 97530 THERAPEUTIC ACTIVITIES: CPT | Mod: GP | Performed by: PHYSICAL THERAPIST

## 2022-01-01 PROCEDURE — C9803 HOPD COVID-19 SPEC COLLECT: HCPCS

## 2022-01-01 PROCEDURE — 87635 SARS-COV-2 COVID-19 AMP PRB: CPT | Performed by: EMERGENCY MEDICINE

## 2022-01-01 PROCEDURE — 82248 BILIRUBIN DIRECT: CPT | Performed by: STUDENT IN AN ORGANIZED HEALTH CARE EDUCATION/TRAINING PROGRAM

## 2022-01-01 PROCEDURE — 250N000009 HC RX 250: Performed by: STUDENT IN AN ORGANIZED HEALTH CARE EDUCATION/TRAINING PROGRAM

## 2022-01-01 PROCEDURE — 49452 REPLACE G-J TUBE PERC: CPT

## 2022-01-01 PROCEDURE — 258N000001 HC RX 258: Performed by: STUDENT IN AN ORGANIZED HEALTH CARE EDUCATION/TRAINING PROGRAM

## 2022-01-01 PROCEDURE — 81001 URINALYSIS AUTO W/SCOPE: CPT | Performed by: FAMILY MEDICINE

## 2022-01-01 PROCEDURE — 96367 TX/PROPH/DG ADDL SEQ IV INF: CPT

## 2022-01-01 PROCEDURE — 96365 THER/PROPH/DIAG IV INF INIT: CPT

## 2022-01-01 PROCEDURE — 87086 URINE CULTURE/COLONY COUNT: CPT | Performed by: FAMILY MEDICINE

## 2022-01-01 PROCEDURE — 74177 CT ABD & PELVIS W/CONTRAST: CPT

## 2022-01-01 PROCEDURE — 97116 GAIT TRAINING THERAPY: CPT | Mod: GP | Performed by: PHYSICAL THERAPIST

## 2022-01-01 PROCEDURE — 250N000009 HC RX 250: Performed by: EMERGENCY MEDICINE

## 2022-01-01 PROCEDURE — 94375 RESPIRATORY FLOW VOLUME LOOP: CPT | Performed by: INTERNAL MEDICINE

## 2022-01-01 PROCEDURE — 97535 SELF CARE MNGMENT TRAINING: CPT | Mod: GP | Performed by: PHYSICAL THERAPIST

## 2022-01-01 PROCEDURE — 97166 OT EVAL MOD COMPLEX 45 MIN: CPT | Mod: GO | Performed by: OCCUPATIONAL THERAPIST

## 2022-01-01 PROCEDURE — 83735 ASSAY OF MAGNESIUM: CPT | Performed by: HOSPITALIST

## 2022-01-01 PROCEDURE — 49440 PLACE GASTROSTOMY TUBE PERC: CPT

## 2022-01-01 PROCEDURE — 97112 NEUROMUSCULAR REEDUCATION: CPT | Mod: GO

## 2022-01-01 PROCEDURE — 80053 COMPREHEN METABOLIC PANEL: CPT | Performed by: STUDENT IN AN ORGANIZED HEALTH CARE EDUCATION/TRAINING PROGRAM

## 2022-01-01 PROCEDURE — 99214 OFFICE O/P EST MOD 30 MIN: CPT | Mod: GC | Performed by: PSYCHIATRY & NEUROLOGY

## 2022-01-01 PROCEDURE — 84100 ASSAY OF PHOSPHORUS: CPT | Performed by: EMERGENCY MEDICINE

## 2022-01-01 PROCEDURE — 250N000011 HC RX IP 250 OP 636: Performed by: RADIOLOGY

## 2022-01-01 PROCEDURE — 258N000003 HC RX IP 258 OP 636: Performed by: INTERNAL MEDICINE

## 2022-01-01 PROCEDURE — 272N000500 HC NEEDLE CR2

## 2022-01-01 PROCEDURE — 96368 THER/DIAG CONCURRENT INF: CPT

## 2022-01-01 PROCEDURE — 999N000065 XR ABDOMEN PORT 1 VIEW

## 2022-01-01 PROCEDURE — 96360 HYDRATION IV INFUSION INIT: CPT

## 2022-01-01 PROCEDURE — 99284 EMERGENCY DEPT VISIT MOD MDM: CPT | Mod: 25

## 2022-01-01 PROCEDURE — 82310 ASSAY OF CALCIUM: CPT | Performed by: EMERGENCY MEDICINE

## 2022-01-01 PROCEDURE — 272N000117 HC CATH CR2

## 2022-01-01 PROCEDURE — G0378 HOSPITAL OBSERVATION PER HR: HCPCS

## 2022-01-01 PROCEDURE — 97535 SELF CARE MNGMENT TRAINING: CPT | Mod: GO

## 2022-01-01 PROCEDURE — 99153 MOD SED SAME PHYS/QHP EA: CPT

## 2022-01-01 RX ORDER — POTASSIUM CHLORIDE 7.45 MG/ML
10 INJECTION INTRAVENOUS
Status: DISPENSED | OUTPATIENT
Start: 2022-01-01 | End: 2022-01-01

## 2022-01-01 RX ORDER — POTASSIUM CHLORIDE 7.45 MG/ML
10 INJECTION INTRAVENOUS
Status: COMPLETED | OUTPATIENT
Start: 2022-01-01 | End: 2022-01-01

## 2022-01-01 RX ORDER — FENTANYL CITRATE 50 UG/ML
INJECTION, SOLUTION INTRAMUSCULAR; INTRAVENOUS PRN
Status: COMPLETED | OUTPATIENT
Start: 2022-01-01 | End: 2022-01-01

## 2022-01-01 RX ORDER — FLUTICASONE PROPIONATE 50 MCG
1 SPRAY, SUSPENSION (ML) NASAL DAILY
Status: DISCONTINUED | OUTPATIENT
Start: 2022-01-01 | End: 2022-01-01 | Stop reason: HOSPADM

## 2022-01-01 RX ORDER — ASPIRIN 81 MG/1
81 TABLET, CHEWABLE ORAL DAILY
Status: DISCONTINUED | OUTPATIENT
Start: 2022-01-01 | End: 2022-01-01 | Stop reason: HOSPADM

## 2022-01-01 RX ORDER — DEXTROMETHORPHAN HYDROBROMIDE AND QUINIDINE SULFATE 20; 10 MG/1; MG/1
1 CAPSULE, GELATIN COATED ORAL DAILY
Start: 2022-01-01

## 2022-01-01 RX ORDER — ASPIRIN 81 MG/1
81 TABLET, CHEWABLE ORAL DAILY
Start: 2022-01-01

## 2022-01-01 RX ORDER — LORAZEPAM 2 MG/ML
1 INJECTION INTRAMUSCULAR EVERY 6 HOURS PRN
Status: DISCONTINUED | OUTPATIENT
Start: 2022-01-01 | End: 2022-01-01

## 2022-01-01 RX ORDER — CEFAZOLIN SODIUM 2 G/100ML
2 INJECTION, SOLUTION INTRAVENOUS
Status: COMPLETED | OUTPATIENT
Start: 2022-01-01 | End: 2022-01-01

## 2022-01-01 RX ORDER — POTASSIUM CHLORIDE 7.45 MG/ML
10 INJECTION INTRAVENOUS ONCE
Status: COMPLETED | OUTPATIENT
Start: 2022-01-01 | End: 2022-01-01

## 2022-01-01 RX ORDER — ATROPINE SULFATE 10 MG/ML
SOLUTION/ DROPS OPHTHALMIC
Qty: 5 ML | Refills: 1 | Status: SHIPPED | OUTPATIENT
Start: 2022-01-01

## 2022-01-01 RX ORDER — NALOXONE HYDROCHLORIDE 0.4 MG/ML
0.4 INJECTION, SOLUTION INTRAMUSCULAR; INTRAVENOUS; SUBCUTANEOUS
Status: DISCONTINUED | OUTPATIENT
Start: 2022-01-01 | End: 2022-01-01 | Stop reason: HOSPADM

## 2022-01-01 RX ORDER — BARIUM SULFATE 400 MG/ML
SUSPENSION ORAL ONCE
Status: COMPLETED | OUTPATIENT
Start: 2022-01-01 | End: 2022-01-01

## 2022-01-01 RX ORDER — SERTRALINE HYDROCHLORIDE 25 MG/1
25 TABLET, FILM COATED ORAL DAILY
Status: DISCONTINUED | OUTPATIENT
Start: 2022-01-01 | End: 2022-01-01 | Stop reason: HOSPADM

## 2022-01-01 RX ORDER — RILUZOLE 50 MG/1
50 TABLET, FILM COATED ORAL EVERY 12 HOURS
Status: DISCONTINUED | OUTPATIENT
Start: 2022-01-01 | End: 2022-01-01 | Stop reason: HOSPADM

## 2022-01-01 RX ORDER — DEXTROSE MONOHYDRATE, SODIUM CHLORIDE, AND POTASSIUM CHLORIDE 50; 1.49; 9 G/1000ML; G/1000ML; G/1000ML
INJECTION, SOLUTION INTRAVENOUS CONTINUOUS
Status: DISCONTINUED | OUTPATIENT
Start: 2022-01-01 | End: 2022-01-01

## 2022-01-01 RX ORDER — OXYCODONE HYDROCHLORIDE 5 MG/1
5 TABLET ORAL EVERY 4 HOURS PRN
Status: DISCONTINUED | OUTPATIENT
Start: 2022-01-01 | End: 2022-01-01 | Stop reason: HOSPADM

## 2022-01-01 RX ORDER — DEXTROSE MONOHYDRATE 100 MG/ML
INJECTION, SOLUTION INTRAVENOUS CONTINUOUS PRN
Status: DISCONTINUED | OUTPATIENT
Start: 2022-01-01 | End: 2022-01-01 | Stop reason: HOSPADM

## 2022-01-01 RX ORDER — SODIUM CHLORIDE 9 MG/ML
INJECTION, SOLUTION INTRAVENOUS ONCE
Status: COMPLETED | OUTPATIENT
Start: 2022-01-01 | End: 2022-01-01

## 2022-01-01 RX ORDER — ENOXAPARIN SODIUM 100 MG/ML
40 INJECTION SUBCUTANEOUS EVERY 24 HOURS
Status: DISCONTINUED | OUTPATIENT
Start: 2022-01-01 | End: 2022-01-01 | Stop reason: HOSPADM

## 2022-01-01 RX ORDER — HYDRALAZINE HYDROCHLORIDE 20 MG/ML
5 INJECTION INTRAMUSCULAR; INTRAVENOUS EVERY 6 HOURS PRN
Status: DISCONTINUED | OUTPATIENT
Start: 2022-01-01 | End: 2022-01-01 | Stop reason: HOSPADM

## 2022-01-01 RX ORDER — ATROPINE SULFATE 10 MG/ML
1-2 SOLUTION/ DROPS OPHTHALMIC 2 TIMES DAILY PRN
Status: DISCONTINUED | OUTPATIENT
Start: 2022-01-01 | End: 2022-01-01 | Stop reason: HOSPADM

## 2022-01-01 RX ORDER — IOPAMIDOL 755 MG/ML
100 INJECTION, SOLUTION INTRAVASCULAR ONCE
Status: COMPLETED | OUTPATIENT
Start: 2022-01-01 | End: 2022-01-01

## 2022-01-01 RX ORDER — ACETAMINOPHEN 325 MG/1
650 TABLET ORAL EVERY 4 HOURS PRN
Status: DISCONTINUED | OUTPATIENT
Start: 2022-01-01 | End: 2022-01-01 | Stop reason: RX

## 2022-01-01 RX ORDER — SODIUM CHLORIDE 9 MG/ML
INJECTION, SOLUTION INTRAVENOUS CONTINUOUS
Status: DISCONTINUED | OUTPATIENT
Start: 2022-01-01 | End: 2022-01-01 | Stop reason: HOSPADM

## 2022-01-01 RX ORDER — RILUZOLE 50 MG/1
50 TABLET, FILM COATED ORAL EVERY 12 HOURS
Qty: 60 TABLET | Refills: 2
Start: 2022-01-01

## 2022-01-01 RX ORDER — POTASSIUM CHLORIDE 20MEQ/15ML
10 LIQUID (ML) ORAL ONCE
Status: COMPLETED | OUTPATIENT
Start: 2022-01-01 | End: 2022-01-01

## 2022-01-01 RX ORDER — ONDANSETRON 2 MG/ML
4 INJECTION INTRAMUSCULAR; INTRAVENOUS EVERY 6 HOURS PRN
Status: DISCONTINUED | OUTPATIENT
Start: 2022-01-01 | End: 2022-01-01 | Stop reason: HOSPADM

## 2022-01-01 RX ORDER — NALOXONE HYDROCHLORIDE 0.4 MG/ML
0.2 INJECTION, SOLUTION INTRAMUSCULAR; INTRAVENOUS; SUBCUTANEOUS
Status: DISCONTINUED | OUTPATIENT
Start: 2022-01-01 | End: 2022-01-01 | Stop reason: HOSPADM

## 2022-01-01 RX ORDER — HYDROMORPHONE HYDROCHLORIDE 1 MG/ML
0.5 INJECTION, SOLUTION INTRAMUSCULAR; INTRAVENOUS; SUBCUTANEOUS EVERY 4 HOURS PRN
Status: DISCONTINUED | OUTPATIENT
Start: 2022-01-01 | End: 2022-01-01 | Stop reason: HOSPADM

## 2022-01-01 RX ORDER — LORAZEPAM 2 MG/ML
0.5 INJECTION INTRAMUSCULAR EVERY 6 HOURS PRN
Status: DISCONTINUED | OUTPATIENT
Start: 2022-01-01 | End: 2022-01-01 | Stop reason: HOSPADM

## 2022-01-01 RX ORDER — MAGNESIUM SULFATE HEPTAHYDRATE 40 MG/ML
2 INJECTION, SOLUTION INTRAVENOUS ONCE
Status: COMPLETED | OUTPATIENT
Start: 2022-01-01 | End: 2022-01-01

## 2022-01-01 RX ORDER — HYDROMORPHONE HYDROCHLORIDE 1 MG/ML
0.5 INJECTION, SOLUTION INTRAMUSCULAR; INTRAVENOUS; SUBCUTANEOUS ONCE
Status: COMPLETED | OUTPATIENT
Start: 2022-01-01 | End: 2022-01-01

## 2022-01-01 RX ORDER — ACETAMINOPHEN 325 MG/1
650 TABLET ORAL ONCE
Status: DISCONTINUED | OUTPATIENT
Start: 2022-01-01 | End: 2022-01-01

## 2022-01-01 RX ORDER — ACETAMINOPHEN 650 MG/1
650 SUPPOSITORY RECTAL ONCE
Status: COMPLETED | OUTPATIENT
Start: 2022-01-01 | End: 2022-01-01

## 2022-01-01 RX ADMIN — HYDRALAZINE HYDROCHLORIDE 5 MG: 20 INJECTION INTRAMUSCULAR; INTRAVENOUS at 13:13

## 2022-01-01 RX ADMIN — SODIUM PHOSPHATE, MONOBASIC, MONOHYDRATE 9 MMOL: 276; 142 INJECTION, SOLUTION INTRAVENOUS at 14:07

## 2022-01-01 RX ADMIN — POTASSIUM CHLORIDE 10 MEQ: 20 SOLUTION ORAL at 10:01

## 2022-01-01 RX ADMIN — SODIUM CHLORIDE 1000 ML: 9 INJECTION, SOLUTION INTRAVENOUS at 14:08

## 2022-01-01 RX ADMIN — POTASSIUM CHLORIDE 10 MEQ: 7.46 INJECTION, SOLUTION INTRAVENOUS at 04:43

## 2022-01-01 RX ADMIN — BARIUM SULFATE: 400 SUSPENSION ORAL at 14:06

## 2022-01-01 RX ADMIN — POTASSIUM CHLORIDE 10 MEQ: 7.46 INJECTION, SOLUTION INTRAVENOUS at 03:33

## 2022-01-01 RX ADMIN — Medication 5 MG: at 21:23

## 2022-01-01 RX ADMIN — HYDROMORPHONE HYDROCHLORIDE 0.5 MG: 1 INJECTION, SOLUTION INTRAMUSCULAR; INTRAVENOUS; SUBCUTANEOUS at 06:40

## 2022-01-01 RX ADMIN — HYDRALAZINE HYDROCHLORIDE 5 MG: 20 INJECTION INTRAMUSCULAR; INTRAVENOUS at 23:57

## 2022-01-01 RX ADMIN — OXYCODONE HYDROCHLORIDE 5 MG: 5 TABLET ORAL at 09:27

## 2022-01-01 RX ADMIN — DEXTROSE AND SODIUM CHLORIDE: 5; 900 INJECTION, SOLUTION INTRAVENOUS at 05:51

## 2022-01-01 RX ADMIN — POTASSIUM CHLORIDE 10 MEQ: 7.46 INJECTION, SOLUTION INTRAVENOUS at 12:12

## 2022-01-01 RX ADMIN — POTASSIUM PHOSPHATE, MONOBASIC AND POTASSIUM PHOSPHATE, DIBASIC 9 MMOL: 224; 236 INJECTION, SOLUTION, CONCENTRATE INTRAVENOUS at 11:36

## 2022-01-01 RX ADMIN — POTASSIUM CHLORIDE 10 MEQ: 7.46 INJECTION, SOLUTION INTRAVENOUS at 08:29

## 2022-01-01 RX ADMIN — FLUTICASONE PROPIONATE 1 SPRAY: 50 SPRAY, METERED NASAL at 15:00

## 2022-01-01 RX ADMIN — MAGNESIUM SULFATE HEPTAHYDRATE 2 G: 40 INJECTION, SOLUTION INTRAVENOUS at 02:19

## 2022-01-01 RX ADMIN — FLUTICASONE PROPIONATE 1 SPRAY: 50 SPRAY, METERED NASAL at 08:53

## 2022-01-01 RX ADMIN — FLUTICASONE PROPIONATE 1 SPRAY: 50 SPRAY, METERED NASAL at 09:18

## 2022-01-01 RX ADMIN — HYDROMORPHONE HYDROCHLORIDE 0.5 MG: 1 INJECTION, SOLUTION INTRAMUSCULAR; INTRAVENOUS; SUBCUTANEOUS at 04:48

## 2022-01-01 RX ADMIN — Medication 40 MG: at 08:23

## 2022-01-01 RX ADMIN — ENOXAPARIN SODIUM 40 MG: 40 INJECTION SUBCUTANEOUS at 21:09

## 2022-01-01 RX ADMIN — Medication 5 MG: at 02:20

## 2022-01-01 RX ADMIN — SODIUM PHOSPHATE, MONOBASIC, MONOHYDRATE 9 MMOL: 276; 142 INJECTION, SOLUTION INTRAVENOUS at 18:21

## 2022-01-01 RX ADMIN — HYDRALAZINE HYDROCHLORIDE 5 MG: 20 INJECTION INTRAMUSCULAR; INTRAVENOUS at 09:34

## 2022-01-01 RX ADMIN — SERTRALINE HYDROCHLORIDE 25 MG: 25 TABLET ORAL at 09:27

## 2022-01-01 RX ADMIN — POTASSIUM CHLORIDE 10 MEQ: 7.46 INJECTION, SOLUTION INTRAVENOUS at 14:51

## 2022-01-01 RX ADMIN — POTASSIUM CHLORIDE 10 MEQ: 7.46 INJECTION, SOLUTION INTRAVENOUS at 09:18

## 2022-01-01 RX ADMIN — POTASSIUM CHLORIDE 10 MEQ: 7.46 INJECTION, SOLUTION INTRAVENOUS at 08:05

## 2022-01-01 RX ADMIN — POTASSIUM CHLORIDE 10 MEQ: 7.46 INJECTION, SOLUTION INTRAVENOUS at 09:19

## 2022-01-01 RX ADMIN — HYDROMORPHONE HYDROCHLORIDE 0.5 MG: 1 INJECTION, SOLUTION INTRAMUSCULAR; INTRAVENOUS; SUBCUTANEOUS at 16:39

## 2022-01-01 RX ADMIN — ASPIRIN 81 MG CHEWABLE TABLET 81 MG: 81 TABLET CHEWABLE at 09:27

## 2022-01-01 RX ADMIN — LORAZEPAM 1 MG: 2 INJECTION INTRAMUSCULAR; INTRAVENOUS at 17:57

## 2022-01-01 RX ADMIN — SODIUM PHOSPHATE, MONOBASIC, MONOHYDRATE 9 MMOL: 276; 142 INJECTION, SOLUTION INTRAVENOUS at 10:23

## 2022-01-01 RX ADMIN — CEFAZOLIN SODIUM 2 G: 2 INJECTION, SOLUTION INTRAVENOUS at 09:56

## 2022-01-01 RX ADMIN — POTASSIUM CHLORIDE 10 MEQ: 7.46 INJECTION, SOLUTION INTRAVENOUS at 02:21

## 2022-01-01 RX ADMIN — ENOXAPARIN SODIUM 40 MG: 40 INJECTION SUBCUTANEOUS at 21:56

## 2022-01-01 RX ADMIN — MIDAZOLAM 1 MG: 1 INJECTION INTRAMUSCULAR; INTRAVENOUS at 09:50

## 2022-01-01 RX ADMIN — LORAZEPAM 0.5 MG: 2 INJECTION INTRAMUSCULAR; INTRAVENOUS at 13:30

## 2022-01-01 RX ADMIN — OXYCODONE HYDROCHLORIDE 5 MG: 5 TABLET ORAL at 08:54

## 2022-01-01 RX ADMIN — DEXTROSE AND SODIUM CHLORIDE 1000 ML: 5; 900 INJECTION, SOLUTION INTRAVENOUS at 17:19

## 2022-01-01 RX ADMIN — POTASSIUM CHLORIDE 10 MEQ: 7.46 INJECTION, SOLUTION INTRAVENOUS at 10:16

## 2022-01-01 RX ADMIN — ENOXAPARIN SODIUM 40 MG: 40 INJECTION SUBCUTANEOUS at 21:14

## 2022-01-01 RX ADMIN — SODIUM CHLORIDE: 9 INJECTION, SOLUTION INTRAVENOUS at 01:09

## 2022-01-01 RX ADMIN — IOPAMIDOL 100 ML: 755 INJECTION, SOLUTION INTRAVENOUS at 07:30

## 2022-01-01 RX ADMIN — SERTRALINE HYDROCHLORIDE 25 MG: 25 TABLET ORAL at 08:53

## 2022-01-01 RX ADMIN — PANTOPRAZOLE SODIUM 40 MG: 40 INJECTION, POWDER, FOR SOLUTION INTRAVENOUS at 02:54

## 2022-01-01 RX ADMIN — POTASSIUM CHLORIDE 10 MEQ: 7.46 INJECTION, SOLUTION INTRAVENOUS at 21:12

## 2022-01-01 RX ADMIN — POTASSIUM CHLORIDE, DEXTROSE MONOHYDRATE AND SODIUM CHLORIDE: 150; 5; 900 INJECTION, SOLUTION INTRAVENOUS at 08:29

## 2022-01-01 RX ADMIN — POTASSIUM CHLORIDE 10 MEQ: 7.46 INJECTION, SOLUTION INTRAVENOUS at 17:58

## 2022-01-01 RX ADMIN — SODIUM CHLORIDE 1000 ML: 9 INJECTION, SOLUTION INTRAVENOUS at 22:54

## 2022-01-01 RX ADMIN — POTASSIUM CHLORIDE 10 MEQ: 7.46 INJECTION, SOLUTION INTRAVENOUS at 21:57

## 2022-01-01 RX ADMIN — ATROPINE SULFATE 2 DROP: 10 SOLUTION/ DROPS OPHTHALMIC at 01:57

## 2022-01-01 RX ADMIN — POTASSIUM CHLORIDE 10 MEQ: 7.46 INJECTION, SOLUTION INTRAVENOUS at 13:15

## 2022-01-01 RX ADMIN — HYDRALAZINE HYDROCHLORIDE 5 MG: 20 INJECTION INTRAMUSCULAR; INTRAVENOUS at 00:40

## 2022-01-01 RX ADMIN — POTASSIUM CHLORIDE 10 MEQ: 7.46 INJECTION, SOLUTION INTRAVENOUS at 08:51

## 2022-01-01 RX ADMIN — ENOXAPARIN SODIUM 40 MG: 40 INJECTION SUBCUTANEOUS at 20:13

## 2022-01-01 RX ADMIN — IOHEXOL 10 ML: 350 INJECTION, SOLUTION INTRAVENOUS at 13:51

## 2022-01-01 RX ADMIN — DEXTROSE AND SODIUM CHLORIDE: 5; 900 INJECTION, SOLUTION INTRAVENOUS at 07:25

## 2022-01-01 RX ADMIN — Medication 40 MG: at 09:54

## 2022-01-01 RX ADMIN — DIATRIZOATE MEGLUMINE AND DIATRIZOATE SODIUM 40 ML: 660; 100 SOLUTION ORAL; RECTAL at 09:21

## 2022-01-01 RX ADMIN — ONDANSETRON 4 MG: 2 INJECTION INTRAMUSCULAR; INTRAVENOUS at 22:50

## 2022-01-01 RX ADMIN — POTASSIUM CHLORIDE 10 MEQ: 7.46 INJECTION, SOLUTION INTRAVENOUS at 01:01

## 2022-01-01 RX ADMIN — ASPIRIN 81 MG CHEWABLE TABLET 81 MG: 81 TABLET CHEWABLE at 08:23

## 2022-01-01 RX ADMIN — SERTRALINE HYDROCHLORIDE 25 MG: 25 TABLET ORAL at 08:23

## 2022-01-01 RX ADMIN — ONDANSETRON 4 MG: 2 INJECTION INTRAMUSCULAR; INTRAVENOUS at 10:50

## 2022-01-01 RX ADMIN — FLUTICASONE PROPIONATE 1 SPRAY: 50 SPRAY, METERED NASAL at 09:27

## 2022-01-01 RX ADMIN — HYDROMORPHONE HYDROCHLORIDE 0.5 MG: 1 INJECTION, SOLUTION INTRAMUSCULAR; INTRAVENOUS; SUBCUTANEOUS at 16:27

## 2022-01-01 RX ADMIN — DEXTROSE AND SODIUM CHLORIDE: 5; 900 INJECTION, SOLUTION INTRAVENOUS at 21:57

## 2022-01-01 RX ADMIN — POTASSIUM CHLORIDE 10 MEQ: 7.46 INJECTION, SOLUTION INTRAVENOUS at 10:36

## 2022-01-01 RX ADMIN — POTASSIUM CHLORIDE 10 MEQ: 7.46 INJECTION, SOLUTION INTRAVENOUS at 15:57

## 2022-01-01 RX ADMIN — ASPIRIN 81 MG CHEWABLE TABLET 81 MG: 81 TABLET CHEWABLE at 08:53

## 2022-01-01 RX ADMIN — DEXTROSE AND SODIUM CHLORIDE: 5; 900 INJECTION, SOLUTION INTRAVENOUS at 04:07

## 2022-01-01 RX ADMIN — FENTANYL CITRATE 50 MCG: 50 INJECTION, SOLUTION INTRAMUSCULAR; INTRAVENOUS at 09:52

## 2022-01-01 RX ADMIN — ACETAMINOPHEN 650 MG: 650 SUPPOSITORY RECTAL at 01:33

## 2022-01-01 RX ADMIN — POTASSIUM CHLORIDE 10 MEQ: 7.46 INJECTION, SOLUTION INTRAVENOUS at 18:49

## 2022-01-01 RX ADMIN — ATROPINE SULFATE 2 DROP: 10 SOLUTION/ DROPS OPHTHALMIC at 00:02

## 2022-01-01 RX ADMIN — POTASSIUM CHLORIDE 10 MEQ: 7.46 INJECTION, SOLUTION INTRAVENOUS at 11:50

## 2022-01-01 RX ADMIN — FLUTICASONE PROPIONATE 1 SPRAY: 50 SPRAY, METERED NASAL at 08:38

## 2022-01-01 RX ADMIN — IOHEXOL 50 ML: 350 INJECTION, SOLUTION INTRAVENOUS at 10:05

## 2022-01-01 RX ADMIN — POTASSIUM CHLORIDE 10 MEQ: 7.46 INJECTION, SOLUTION INTRAVENOUS at 05:58

## 2022-01-01 RX ADMIN — POTASSIUM CHLORIDE 10 MEQ: 7.46 INJECTION, SOLUTION INTRAVENOUS at 08:37

## 2022-01-01 RX ADMIN — ACETAMINOPHEN 650 MG: 650 SOLUTION ORAL at 14:32

## 2022-01-01 ASSESSMENT — ACTIVITIES OF DAILY LIVING (ADL)
ADLS_ACUITY_SCORE: 16
HEARING_DIFFICULTY_OR_DEAF: NO
ADLS_ACUITY_SCORE: 44
ADLS_ACUITY_SCORE: 14
ADLS_ACUITY_SCORE: 20
ADLS_ACUITY_SCORE: 36
ADLS_ACUITY_SCORE: 36
ADLS_ACUITY_SCORE: 18
ADLS_ACUITY_SCORE: 44
WALKING_OR_CLIMBING_STAIRS_DIFFICULTY: YES
ADLS_ACUITY_SCORE: 36
ADLS_ACUITY_SCORE: 36
ADLS_ACUITY_SCORE: 16
ADLS_ACUITY_SCORE: 16
ADLS_ACUITY_SCORE: 44
ADLS_ACUITY_SCORE: 16
BATHING: 0-->INDEPENDENT
ADLS_ACUITY_SCORE: 36
ADLS_ACUITY_SCORE: 18
ADLS_ACUITY_SCORE: 16
ADLS_ACUITY_SCORE: 16
ADLS_ACUITY_SCORE: 18
ADLS_ACUITY_SCORE: 18
ADLS_ACUITY_SCORE: 16
ADLS_ACUITY_SCORE: 36
ADLS_ACUITY_SCORE: 16
ADLS_ACUITY_SCORE: 35
ADLS_ACUITY_SCORE: 36
ADLS_ACUITY_SCORE: 36
ADLS_ACUITY_SCORE: 18
ADLS_ACUITY_SCORE: 18
ADLS_ACUITY_SCORE: 36
ADLS_ACUITY_SCORE: 36
COMMUNICATION: DIFFICULTY SPEAKING
ADLS_ACUITY_SCORE: 36
ADLS_ACUITY_SCORE: 16
ADLS_ACUITY_SCORE: 35
ADLS_ACUITY_SCORE: 36
WEAR_GLASSES_OR_BLIND: NO
ADLS_ACUITY_SCORE: 44
ADLS_ACUITY_SCORE: 44
ADLS_ACUITY_SCORE: 14
ADLS_ACUITY_SCORE: 20
ADLS_ACUITY_SCORE: 34
ADLS_ACUITY_SCORE: 48
ADLS_ACUITY_SCORE: 20
ADLS_ACUITY_SCORE: 18
ADLS_ACUITY_SCORE: 18
SWALLOWING: 2-->DIFFICULTY SWALLOWING LIQUIDS/FOODS
ADLS_ACUITY_SCORE: 18
ADLS_ACUITY_SCORE: 36
ADLS_ACUITY_SCORE: 44
ADLS_ACUITY_SCORE: 36
ADLS_ACUITY_SCORE: 18
ADLS_ACUITY_SCORE: 36
ADLS_ACUITY_SCORE: 16
ADLS_ACUITY_SCORE: 44
ADLS_ACUITY_SCORE: 20
ADLS_ACUITY_SCORE: 20
ADLS_ACUITY_SCORE: 18
EATING: 0-->INDEPENDENT
ADLS_ACUITY_SCORE: 20
ADLS_ACUITY_SCORE: 14
FALL_HISTORY_WITHIN_LAST_SIX_MONTHS: NO
ADLS_ACUITY_SCORE: 44
TRANSFERRING: 0-->INDEPENDENT
DEPENDENT_IADLS:: LAUNDRY;SHOPPING;TRANSPORTATION
ADLS_ACUITY_SCORE: 14
ADLS_ACUITY_SCORE: 16
ADLS_ACUITY_SCORE: 18
TOILETING_ISSUES: NO
ADLS_ACUITY_SCORE: 48
EQUIPMENT_CURRENTLY_USED_AT_HOME: WALKER, ROLLING
ADLS_ACUITY_SCORE: 36
ADLS_ACUITY_SCORE: 16
ADLS_ACUITY_SCORE: 44
ADLS_ACUITY_SCORE: 36
ADLS_ACUITY_SCORE: 16
ADLS_ACUITY_SCORE: 36
ADLS_ACUITY_SCORE: 16
ADLS_ACUITY_SCORE: 20
ADLS_ACUITY_SCORE: 16
EATING/SWALLOWING: EATING;SWALLOWING LIQUIDS
ADLS_ACUITY_SCORE: 36
ADLS_ACUITY_SCORE: 16
ADLS_ACUITY_SCORE: 44
ADLS_ACUITY_SCORE: 36
ADLS_ACUITY_SCORE: 36
ADLS_ACUITY_SCORE: 16
ADLS_ACUITY_SCORE: 14
ADLS_ACUITY_SCORE: 14
ADLS_ACUITY_SCORE: 36
ADLS_ACUITY_SCORE: 18
ADLS_ACUITY_SCORE: 44
ADLS_ACUITY_SCORE: 36
ADLS_ACUITY_SCORE: 44
ADLS_ACUITY_SCORE: 44
DIFFICULTY_COMMUNICATING: YES
DRESS: 0-->INDEPENDENT
ADLS_ACUITY_SCORE: 18
ADLS_ACUITY_SCORE: 16
ADLS_ACUITY_SCORE: 36
ADLS_ACUITY_SCORE: 16
DRESSING/BATHING_DIFFICULTY: YES
ADLS_ACUITY_SCORE: 36
ADLS_ACUITY_SCORE: 48
ADLS_ACUITY_SCORE: 20
TRANSFERRING: 0-->INDEPENDENT
ADLS_ACUITY_SCORE: 36
ADLS_ACUITY_SCORE: 18
ADLS_ACUITY_SCORE: 44
ADLS_ACUITY_SCORE: 18
ADLS_ACUITY_SCORE: 36
ADLS_ACUITY_SCORE: 16
ADLS_ACUITY_SCORE: 14
ADLS_ACUITY_SCORE: 14
ADLS_ACUITY_SCORE: 20
DOING_ERRANDS_INDEPENDENTLY_DIFFICULTY: YES
ADLS_ACUITY_SCORE: 16
ADLS_ACUITY_SCORE: 36
ADLS_ACUITY_SCORE: 44
ADLS_ACUITY_SCORE: 16
ADLS_ACUITY_SCORE: 44
ADLS_ACUITY_SCORE: 35
ADLS_ACUITY_SCORE: 16
ADLS_ACUITY_SCORE: 44
ADLS_ACUITY_SCORE: 44
DRESS: 0-->INDEPENDENT
ADLS_ACUITY_SCORE: 36
ADLS_ACUITY_SCORE: 44
CONCENTRATING,_REMEMBERING_OR_MAKING_DECISIONS_DIFFICULTY: NO
ADLS_ACUITY_SCORE: 16
ADLS_ACUITY_SCORE: 36
ADLS_ACUITY_SCORE: 20
ADLS_ACUITY_SCORE: 48
ADLS_ACUITY_SCORE: 16
DIFFICULTY_EATING/SWALLOWING: YES
ADLS_ACUITY_SCORE: 18
ADLS_ACUITY_SCORE: 36
ADLS_ACUITY_SCORE: 14
ADLS_ACUITY_SCORE: 20
ADLS_ACUITY_SCORE: 44
ADLS_ACUITY_SCORE: 36
ADLS_ACUITY_SCORE: 18
ADLS_ACUITY_SCORE: 18
ADLS_ACUITY_SCORE: 44
SWALLOWING: 2-->DIFFICULTY SWALLOWING LIQUIDS/FOODS
ADLS_ACUITY_SCORE: 44
ADLS_ACUITY_SCORE: 16
ADLS_ACUITY_SCORE: 18
ADLS_ACUITY_SCORE: 16
EATING: 0-->INDEPENDENT
ADLS_ACUITY_SCORE: 16
ADLS_ACUITY_SCORE: 36
ADLS_ACUITY_SCORE: 18
ADLS_ACUITY_SCORE: 18

## 2022-01-01 ASSESSMENT — REVISED AMYOTROPHIC LATERAL SCLEROSIS FUNCTIONAL RATING SCALE (ALSFRS-R)
DYSPNEA: 2
DYSPNEA: OCCURS WITH ONE OR MORE OF THE FOLLOWING: EATING, BATHING, DRESSING (ADL)
CLIMBING_STAIRS: 1
DRESSING_AND_HYGEINE: INTERMITTENT ASSISTANCE OR SUBSTITUTE METHODS
SALIVATION: 0
SPEECH: LOSS OF USEFUL SPEECH
RESPIRATORY_SUBTOTAL_SCORE: 10
TURNING_IN_BED_AND_ADJUSTING_BED_CLOTHES: 2
SWALLOWING: 2
SPEECH: 0
SIX_ITEM_SUBTOTAL: 12
WALKING: EARLY AMBULATION DIFFICULTIES
CLIMBING_STAIRS: NEEDS ASSISTANCE
ALSFRS_TOTAL_SCORE: 26
FINE_MOTOR_SUBTOTAL_SCORE: 8
WALKING: 3
DRESSING_AND_HYGEINE: 2
TURNING_IN_BED_AND_ADJUSTING_BED_CLOTHES: CAN TURN ALONE OR ADJUST SHEETS, BUT WITH GREAT DIFFICULTY
HANDWRITING: SLOW OR SLOPPY: ALL WORDS ARE LEGIBLE
SALIVATION: MARKED DROOLING; REQUIRES CONSTANT TISSUE OR HANDKERCHIEF
CUTTING_FOOD_AND_HANDLING_UTENSILES: 3
HANDWRITING: 3
ORTHOPENA: 4
SWALLOWING: DIETARY CONSISTENCY CHANGES
RESPIRATORY_INSUFFICIENCY: 4
GROSS_MOTOR_SUBTOTAL_SCORE: 6
BULBAR_SUBTOTAL: 2

## 2022-01-01 ASSESSMENT — ENCOUNTER SYMPTOMS
NAUSEA: 0
BACK PAIN: 1
CHILLS: 0
WEAKNESS: 1
NUMBNESS: 1
DIARRHEA: 0
SHORTNESS OF BREATH: 0
FEVER: 0
APPETITE CHANGE: 1
FATIGUE: 1
ABDOMINAL PAIN: 1
BLOOD IN STOOL: 0
DYSURIA: 0
ABDOMINAL PAIN: 1
FEVER: 0
COUGH: 0
VOMITING: 0
VOMITING: 0

## 2022-01-01 ASSESSMENT — PAIN SCALES - GENERAL: PAINLEVEL: MILD PAIN (2)

## 2022-02-07 NOTE — TELEPHONE ENCOUNTER
[facility] Call Center    Phone Message    May a detailed message be left on voicemail: yes     Reason for Call: Other: Patient daughter in law is requesting a call back to discuss riluzole 50 mg medication is not covered by patient insurance, please call Audrey  at [phone] to advise.     Action Taken: Message routed to:  [facility]    Travel Screening: Not Applicable

## 2022-02-08 NOTE — TELEPHONE ENCOUNTER
Prior Authorization Retail Medication Request    Medication/Dose: Riluzole 50 mg  ICD code (if different than what is on RX):  g12.21  Previously Tried and Failed:  NA  Rationale:  NA    Insurance Name:  Kenn  Insurance ID:  Now requiring PA for refill      Pharmacy Information (if different than what is on RX)  Name:  [facility]  Phone:  [phone]

## 2022-02-10 NOTE — TELEPHONE ENCOUNTER
Central Prior Authorization Team   Phone: [phone]    PA Initiation    Medication: riluzole (RILUTEK) 50 MG tablet  Insurance Company: [employer] - Phone [phone] Fax [phone]  Pharmacy Filling the Rx: [id number] [place], [place] - [address] [place] [address]  Filling Pharmacy Phone: [phone]  Filling Pharmacy Fax: [phone]  Start Date: 2/10/2022

## 2022-02-10 NOTE — TELEPHONE ENCOUNTER
Prior Authorization Approval        Authorization Effective Date: 1/11/2022  Authorization Expiration Date: 2/10/2023  Medication: riluzole (RILUTEK) 50 MG tablet-PA APPROVED   Approved Dose/Quantity:   Reference #:     Insurance Company: [employer] - Phone [phone] Fax [phone]  Expected CoPay:       CoPay Card Available:      Foundation Assistance Needed:    Which Pharmacy is filling the prescription (Not needed for infusion/clinic administered): [id number] [address] [place] [address]  Pharmacy Notified: Yes- **Instructed pharmacy to notify patient when script is ready to /ship.**  Patient Notified: Yes

## 2022-02-11 NOTE — TELEPHONE ENCOUNTER
[facility] Call Center    Phone Message    May a detailed message be left on voicemail: yes     Reason for Call: Other: Please call Audrey patient daughter in law to discuss patient riluzole (RILUTEK) 50 MG tablet  medication, please call Audrey at [phone] to advise.     Action Taken: Message routed to:  [facility]    Travel Screening: Not Applicable

## 2022-02-11 NOTE — TELEPHONE ENCOUNTER
Pt will contact Ins Co to see if there is a preferred pharmacy that would make the cost ($150 monthly) less expensive. She will let us know if pharmacy change is needed.

## 2022-02-24 NOTE — TELEPHONE ENCOUNTER
Nutrition Services    During 12/30/21 clinic appt, recommendation for PEG placement was made by Dr. Perkins and RD based on limited PO intake and recent wt loss.     Dr. Perkins requested RD follow up with pt in 4-6 weeks to inquire about decision re: PEG placement.    RD has attempted to reach pt at number on file: 2/3/22, 2/10/22, 2/17/22, and 2/24/22 with no luck connecting with patient or callback. Attempted to connect with pt's daughter, Audrey, today with no luck.  Unable to leave callback number due to working remotely.     Will continue to reach out to patient next Thursday (3/4/22).    Leta Ruiz RDN, LD

## 2022-03-24 NOTE — TELEPHONE ENCOUNTER
[facility] Call Center    Phone Message    May a detailed message be left on voicemail: yes     Reason for Call:  Nay patients daughter in law called to speak to Lupe. Please call back to assist with rescheduling patients appts and to address other concerns. Detailed information not given to writer, per nay she will address with nurse when Lupe calls.         Action Taken: Message routed to:  [facility] neurology    Travel Screening: Not Applicable

## 2022-04-28 NOTE — PROGRESS NOTES
[facility] Services      Outpatient Speech Language Pathology Neurology Clinic Evaluation  Bailey Malhotra YOB: 1955 [id number]    Visit Date: 4/28/22    Age: 66 year old    Medical Diagnosis: Amyotrophic lateral sclerosis (ALS)    Date of Diagnosis: Initial PBP 4/7/21, bulbar onset ALS 6/17/21    Referring MD: Dr Perkins     present: No    PMH: Refer to Medical Chart    Fall Risk:Refer to physician report    Others at Clinic Visit: daughter-in-law Audrey    Living Situation: Alone (SW addressing)    Patient Concerns/Goals: Increased swallowing difficulty    Observations: Patient and DIL are pleasant and cooperative. She has decided to not pursue feeding tube and will soon transition to hospice    Current Mode of Nutrition: Oral diet including chicken ignacio, shrimp and noodles, eggs, etc. Continuing thin liquids despite recommendations for thickened.    Weight: 126lbs (159lbs at dx 4/7/21)    Cardio-Respiratory Status: Forced vital capacity: 40%    Functional Rating Scale (ALS-FRS): 26/48      Oral Motor/Swallowing  Oral Motor Function: Anomalies present:  Mandibular anomaly- none  Labial anomaly- ROM (profound), Strength (profound), Rate (profound). No functional movement  Lingual anomaly- ROM (profound), Strength (profound), and Rate (profound). No functional movement    Volitional Abilities:  Cough- Unable to cough  Throat Clear- Not functional  Swallow- Present    Swallow Function:   Thin Liquid-  Significant anterior spillage (greater than half of bolus), severely reduced bolus prep/control and A-P propulsion, suspected premature spillage, moderately reduced laryngeal elevation and constriction. No overt s/s aspiration, educated in risk of silent aspiration.  Nectar Thick Liquid- not assessed at patient request, detailed education again provided  Regular Solid-  Severely increased mastication time  and requires use of finger to manipulate food in her mouth, she then holds orally for over 60 seconds unable to initiate A-P propulsion, eventually requires liquid wash to propel, anterior loss of portions of bolus, moderate-severely reduced laryngeal elevation but no overt s/s aspiration.    Dysphagia Diagnosis: profound oral, severe pharyngeal dysphagia    Dysphagia Intervention: SLP provided rimma and detailed education in risks of respiratory compromise from aspiration due to severity of dysphagia. Trained safe swallow strategies to reduce these aspiration risks (including small bites/sips, slow rate of intake, chin tuck/neutral head position, pills in purees (she takes in cool whip), mindful swallowing) Educated in role of oral cares in respiratory health with increased aspiration risks. Also trained diet modifications to reduce risk of aspiration and ease intake with recommendation for pureed solids and thickened liquids however she has consistently refused thickening liquids. Provided education in use of naturally thick liquids (protein shakes smoothies, etc).      Speech Intelligibility/Functional Communication   Methods of communication: Augmentative and alternative communication (AAC)    Dysarthria: Profound/anarthria    Speech: Anarthria- no functional speech    Speech Intelligibility/Communication:Impacts daily activities, Impacts social activities, Impacts phone usage and Impacts ability to communicate basic wants/needs    Augmentative and Alternative Communication (AAC):  Has Boogie Board and iPad with Speech Assistant. She uses iPad proficiently throughout session.      Clinical Impression/Plan of Care  Treatment Diagnosis: Oropharyngeal Dysphagia     Recommendations:  Oral diet of pureed solids and nectar thick liquids  If she continues to choose thin liquids, monitor closely for signs of respiratory compromise  Continue use of safe swallow strategies  Continue use of AAC    Plan of Care:  1 session  evaluation and treatment.    Goals: Based on today's evaluation session patient and/or caregiver will have understanding of current communication and/or swallowing status and recommendations for management.    Educational Assessment:  Learners- Patient and Daughter-in-Law  Barriers to Learning- No barriers.    Education provided/response:   Swallowing  AAC  Diet  Verbalized understanding    Treatment provided this date:  Swallow intervention, 25 minutes (see above for details)  Communication intervention, 0 minutes    Response to recommendations/treatment: verbalized understanding, asked appropriate questions, agreed to recommendations    Goal attainment: All goals met    Risks and benefits of evaluation/treatment have been explained.  Patient, family and/or caregiver are in agreement with Plan of Care.    Timed Code Treatment Minutes: 0 minutes of timed codes    Total Treatment Time (sum of timed and untimed services): 33 minutes

## 2022-04-28 NOTE — PROGRESS NOTES
CLINICAL NUTRITION SERVICES - REASSESSMENT NOTE     EVALUATION OF PREVIOUS PLAN OF CARE:   Referring Physician: Maddie  Current diet: general diet, mostly softer foods/ continued decline of intake  PEG Tube: Pt not interested in PEG    Monitoring from previous assessment:   -Food intake - eggs, ham, yogurt, coffee, shrimp or chicken ignacio  -Liquid meal replacement or supplement - Pt interested in adding ONS to daily regimen  -Weight trends - 20# wt loss since last appt in December 2021    Previous Goals:   1.  Aim for 5-6 small frequent meals  2.  Aim for 1800kcal and 70g protein/day  3. Weight maintenance    Evaluation: Not met     Previous Nutrition Diagnosis:   Inadequate oral intake related to dysphagia as evidenced by patient modifying food textures and choices, 9% wt loss x past 6 months  Evaluation: Declining     CURRENT NUTRITION DIAGNOSIS   Inadequate oral intake related to dysphagia as evidenced by patient modifying food textures and choices, 21% wt loss x past 12 months     INTERVENTIONS   Recommendations / Nutrition Prescription   Nutrition Education - Ways to maximize kcal and protein intake. Discussed calorie and protein needs for maintenance of weight and nutrition status.  Advised pt to aim for 5-6 small frequent meals.    - ONS (Ensure Enlive, Ensure Plus/Boost Plus, CIB, Benecalorie, Scandi shake etc). Suggested ways to incorporate these supplements to avoid flavor fatigue. Encouraged pt to start consuming 2 ONS daily.     - Sent message to  rep for samples of  oral nutrition shakes.  Shakes will be delivered in 1-2 days. Offered coupon code '[email or web address]' for further shake purchase online.     Provided pt with corresponding education materials/handouts on:  Six Small Meals a Day, High Calorie, High Protein Recipe booklet, Tips to Increase the Calories in Your Diet, Tips to Increase the Protein in Your Diet and Protein Sources.      Implementation  Medical Food Supplement     Goals   1. Aim for 5-6  small frequent meals  2. Aim for 1800kcal and 70g protein/day  3. Weight maintenance     Follow up/Monitoring:   Progress towards goals will be monitored and evaluated per protocol and Practice Guidelines    Leta Ruiz RDN, LD

## 2022-04-28 NOTE — LETTER
2022       RE: Bailey Malhotra  [address] [facility] [address]     Dear Colleague,    Thank you for referring your patient, Bailey Malhotra, to the [facility] [place] at [facility]. Please see a copy of my visit note below.    [facility]: [place]  Neuromuscular Progress Note    Patient Name:  Bailey Malhotra  MRN:  [id number]    :  1955  Date of Service:  2022  Primary care provider:  Daniel Fortune    Brief Summary:   Ms. Malhotra is a 67 year old woman with a h/o progressive bulbar palsy who presents for ALS follow-up. Symptom onset of dysphagia in Summer 2020 along with pseudobulbar affect. Started on Riluzole in 2021.     Subjective:   She was last seen in clinic on 21. Started on Riluzole in 2021 after LFTs normalized.She has had a significant decline since her last visit. Uses walker at home now primarily. Has had significant loss of muscle mass and noticed drooping of her head.     Primarily eats chicken, shrimp and pasta, and scrambled eggs a lot, denies choking sensation but coughs a lot. Still living alone, does not want to move in with family. Has fallen a few times, is still driving.     Clearly states she does not want a feeding tube. Would like to stay at home, wondering about options    Cough: weaker. She does not have cough assist or mucinex    Breathing: No SOB at rest or with exertion. No orthopnea  Sleep: Sleeping well, no morning headaches or daytime sleepiness  Dysarthria:  Anarthric, uses iPad  Dysphagia: present, mostly with pills and liquids. States she's not choking, but does cough with eating (daughter-in-law states she chokes with eating); worsened changed in the last 3 months  Sialorrhea: present, progressive. Noting thicker secretions; prescribed atropine drops - forgot to use; stopped glycopyrrolate 2/2 side effects  Weight: Lost about 20lbs  over the last 4 months   Mobility: fallen multiple times; uses walker at home; does have a life alert necklace that she wears at home  Spasticity: some in hands, maybe developing some contractures?  Pain/Cramps: in back and upper neck, improved with ibuprofen   ADLs: Does most on her own, but slowly  Depression: is a homebody by nature, daughter-in-law confirms more depressed mood, mainly due to frustration over activities  Pseudobulbar: present, on sertraline and has been helpful  Life Planning:POLST today,  family checks in on her                                                 ROS: A 10-point ROS was performed as per HPI.    PMH:  Past Medical History:   Diagnosis Date     Cholelithiasis      GERD (gastroesophageal reflux disease)      Past Surgical History:   Procedure Laterality Date     TUBAL LIGATION         Medications:      Current Outpatient Medications:      aspirin (ASA) 81 MG chewable tablet, Take 81 mg by mouth daily, Disp: , Rfl:      atropine 1 % ophthalmic solution, Take 1-2 drops by mouth, place under tongue or place inside cheek 2 times daily as needed (excess saliva), Disp: 5 mL, Rfl: 1     calcium carbonate 600 mg-vitamin D 400 units (CALTRATE) 600-400 MG-UNIT per tablet, Take 1 tablet by mouth daily, Disp: , Rfl:      diphenhydrAMINE-acetaminophen (TYLENOL PM)  MG tablet, Take 2 tablets by mouth daily, Disp: , Rfl:      multivitamin, therapeutic with minerals (THERA-VIT-M) TABS tablet, Take 1 tablet by mouth daily, Disp: , Rfl:      sertraline (ZOLOFT) 50 MG tablet, TAKE ONE AND ONE-HALF TABLETS BY MOUTH DAILY, Disp: 45 tablet, Rfl: 2     NUEDEXTA 20-10 MG capsule, Take 1 capsule by mouth See Admin Instructions (Patient not taking: Reported on 4/28/2022), Disp: , Rfl:      riluzole (RILUTEK) 50 MG tablet, Take 1 tablet (50 mg) by mouth every 12 hours (Patient not taking: Reported on 4/28/2022), Disp: 60 tablet, Rfl: 2  Not taking Nudexta or glycopyrolate    Physical Examination:   BP (!)  144/96   Pulse 83   Resp 17   Wt 57.2 kg (126 lb)   LMP  (LMP Unknown)   SpO2 96%   BMI 21.63 kg/m    Wt Readings from Last 4 Encounters:   04/28/22 57.2 kg (126 lb)   12/30/21 66.4 kg (146 lb 6.4 oz)   09/23/21 68.2 kg (150 lb 6.4 oz)   06/17/21 70.3 kg (155 lb)     General: pt sitting comfortably in chair, breathing easily on ra, in NAD   -MS: alert, answering questions appropriately    NM Exam: Cranial     Atrophy Fasciculations   Tongue: Positive Positive      Facial weakness: moderate/severe  Tongue movements: basically none  Tongue weakness: moderate/severe  Jaw jerk: present  Speech: anarthric    NM Exam: Axial    Neck flexion weakness: moderate  Neck extension weakness: none    NM Exam: Upper Extremities     Right Left   Atrophy: some some   Fasciculations: Negative Negative   Muscle tone: normal normal   Rapid alt. movements:     Reflexes Right Left   Biceps: increased increased   Brachioradialis: increased increased   Triceps: increased increased   Braga: right Braga reflex present left Braga reflex present   Strength Right Left   * Indicates a recommended field     Shoulder abduction*: 2 4   Elbow flexion: 4 4   Elbow extension*: 4 4+   Wrist extension*:  Wrist flexion 2  3 4+  4+   Finger extension: 2 4   Finger flexion:     Abductor pollicis brevis: 2 4+   First dorsal interosseous*: 2 4   Abductor digiti minimi:         NM Exam: Lower Extremities     Right Left   Atrophy: Negative Negative   Fasciculations: Negative Negative   Muscle tone: normal normal   Rapid alt. movements:     Reflexes Right Left   Patellar: increased increased   Achilles: normal normal   Plantar:     Strength Right Left   * Indicates a recommended field     Hip flexion*: 4- 4   Knee extension*: 5 5   Knee flexion: 5 5   Ankle dorsiflexion*: 4+ 4+   Ankle plantar flexion: 5 5   Normal gait: slow cautious     Investigations:    PFTs    4/7/2021: FVC 69%, MEP 36, MIP -35   9/23/2021: FVC 62% MEP 34 MIP -33   12/30/2021:  FVC 36% MEP 31 MIP -25  Lab:    LFTs: 6/17/21 ALT 34 AST 24 Alk Phos 120   LFTs: 9/23/21: ALT 52 AST 38 Alk Phos 163    Impression:  Ms. Malhotra is a 67 year old RHD woman with a h/o progressive bulbar palsy with significant worsening of her symptoms since last visit.     1) Progressive bulbar palsy  Symptom onset of dysphagia in Summer 2020 along with pseudobulbar affect.  Since last visit has had progression of bulbar symptoms, but especially distal legs and right upper extremity. Discussed overall goals of care today and completed a POLST. She clearly states she is not interested a GTube and would prefer comofrot-favored care. She still lives alone and prefers this, completing her ADLs independently but slowly. Recommend PT/OT eval for home health aids, consider potential for home health care. She continues to have significant bulbar symptoms, will have RD/ST today to review other ways to maintain nutrition safely and maintain communication (iPad). Does not want to use nightly NIV but does see benefit of a cough assist device for clearing her throat and is interested. Her decreased lung capacity and bulbar weakness would make her a good candidate. For secretions she failed glycopyrolate, discussed trailing atropine drops again at this time (forgot these were prescribed). Would not recommend botox currently given still eating by mouth and concern for worsening weakness. Will report efficacy. Continued riluzole and doing well, no longer needing LFTs monitoring. Given progressive depressive symptoms, reocmmend increase Sertraline given previous tolerance. Has significant questions about advanced directives and other options to stay at home, will have SW discuss with her and family today as well. Did discuss briefly the option of hospice but will get input from therapists first about function level at home. Lastly, she is not interested in genetic counseling/testing.    Recommendations:   - order cough assist  device  - Continue Riluzole  - Increase Sertraline to 100mg daily  - Start atropine drops TID PRN  - PT/OT to assess for home safety needs, potential need/benefit for home care  - SLP/RD today  - POLST completed today  - PFTs today  - consider home hospice vs home health based on therapist eval    - RTC 3 mo    Seen and discussed with Dr. Perkisn. His addendum follows    Silvina Shoemaker MD  Neuromuscular Fellow    I personally examined the patient and concur with the resident's note. Dr Shoemaker and I explained in detail the indications for gastrostomy for quality of life and possible management of secretions, as well as the evidence favoring use of NIV. She indicated a clear desire to avoid medical interventions. A POLST was completed and recommendations reviewed with team members and the patient.      Phi Perkins M.D.    30 minutes spent on the date of the encounter on chart review, history and examination, documentation and further activities as noted above.

## 2022-04-28 NOTE — PATIENT INSTRUCTIONS
Try using Atropine drops up to three times a day if needed to decrease your saliva production    Try Mucinex /guaifenesin ( NOT the version with DM) daily to help clear the phlegm from your throat    We will order a Cough assist device for you. This can be used to help clear your throat. It will take a week or 2 and someone will call you about delivering the device.    Please increase your Sertraline dose to 2 pills (100mg) daily to help with your mood.       Please call [facility] @ [phone] for questions or concerns during regular business hours. For a more efficient way to communicate, use [employer] and address the message to your physician. Remember, [employer] is only read during business hours. Do not leave urgent messages on voicemail or [employer]. If situation is urgent, contact the Neurology Clinic @ [phone] and ask to speak to a Triage Nurse or Call 911 or visit an Emergency Department.     Please call your pharmacy if you need a medication refill. They will send us an electronic message.

## 2022-04-28 NOTE — PROGRESS NOTES
"                                                                           [facility]    OUTPATIENT OCCUPATIONAL THERAPY CLINIC NOTE  Bailey Malhotra  YOB: 1955  [id number]    Type of visit:  Evaluation            Date of service: 4/28/2022    Referring provider: Dr. Phi Perkins     present: No  Language: english    Others present at visit:  Daughter-in-lawAudrey    Medical diagnosis:   Amyotrophic lateral sclerosis (ALS)     Date of diagnosis: 4/7/21     Pertinent medical history:  S/p tania, GERD    Additional Occupational Profile Information (patterns of daily living, interests, values and needs): Pt. Is a 66 yo woman with a hx of progressive bulbar palsy with symptom onset of dysphagia 2020 and pseudobulbar affect    Cardio-respiratory status:  Forced vital capacity: 40 % of predicted , MIP-16    Height/Weight: 5' 4\" / 126 lb    Living environment:  House    Living environment barriers:  4 stairs to enter (1 railing present)-   Laundry basement R side rail going up   Tub/shower with shower doors and grab bars   Regular height toilet with grab bar    Current assistance/living environment:  Lives alone    Has dtr in law and son supportive  Current mobility equipment:  4 wheeled Walker-too large for her size and stature    Current ADL equipment:  Wall grab bars     Technology used: iPad    Patient concerns/goals: head drop per MD, weakness now using walker, completing ADL but slowly. Lives alone and does not want to move in with family, has fallen a few times.    Evaluation   Interview completed.   Pain assessment:  Pain denied    Range of motion:  UE: R UE weakness 2/5 at shoulder L UE 4 to 4+/5    Manual muscle testing: See neurology notes    Cognition:  NT due to fatigue, but follows 1-2 step commands. Does need cue to use her AAC device to aid communication as otherwise tends to attempt non-verbal communication with eye contact only    ADL:   Feeding " self:  Ind  Grooming: Ind  UE Dressing:  Ind  LE Dressing:  Ind  Showering/bathing: Ind  Toileting/transfer:  Ind  Functional Mobility: Per PT at risk with TUG without assistive device and observed with 4ww today with improved speed and safety    IADL:   Home management:  Ind, son can help with things as needed; pt needs to ask  Driving:  Still driving  Occupation: NT  Leisure: NT  Emergency Call system:[place] Alert necklace she wears      Fall Risk Screen:   Has the patient fallen 2 or more times in the last year? Yes      Has the patient fallen and had an injury in the past year? Yes    Pulling trash out of trash bin and got black eye;      Timed Up and Go Score: defer to PT    Is the patient a fall risk? Yes, department fall risk interventions implemented     Impairments:  Fatigue  Muscle atrophy  Coordination  Balance  Cognition  Range of motion  Respiratory compromise     Treatment diagnosis:  Impaired mobility  Impaired activities of daily living  Impaired IADL    Assessment of Occupational Performance: 3-5 performance deficits  Identified Performance Deficits (ie: feeding, social skills): showering, meal prep, laundry, shopping, home management,   Clinical Decision Making (Complexity): Moderate complexity     Recommendations/Plan of care:  Patient would benefit from interventions to enhance safety and independence.  Rehab potential good for stated goals.  Occupational therapy intervention for  self care/home management.  1 session evaluation & treatment.    Goals:   Target date: today  Patient, family and/or caregiver will verbalize understanding of evaluation results and implications for functional performance.  Patient, family and/or caregiver will verbalize/demonstrate understanding of compensatory methods /equipment to enhance functional independence and safety.  Patient, family and/or caregiver will verbalize/demonstrate safe transfer techniques.  Patient, family and/or caregiver will verbalize energy  "management techniques appropriate for status and setting.      Educational assessment/barriers to learning:  ?Cognitive  communication      Treatment provided this date:   Self care/home management, 15 minutes of training in:  -purpose of manual w/c and cushion for distance use to reduce falls ad fatigue if has family transport her longer distances. Rquested from ALS for pt-see measurements below, along with w/c cushion for pressure relief    Measurements for wheelchair  Hip to knee: 19  Knee to heel: 20  Hip to elbow: 11  Hip to underarm: 16  Hip to shoulder: 22  Hip to head: 29  Hip width: 16  Chest width: 13  Shoulder width: 16    Height: 5' 4\"  Weight: 126 lb    -training in purpose of DME for preventing falls and saving energy with tub transfers: extended tub bench and purpose of removal of shower doors and hanging of temporary shower curtain to prevent falls with stepping over the side of tub. Pt stated she did not want to remove shower doors and was made aware that if she doesn't remove them she will not be able to access the tub/shower would have to rely on sponge bath/bed bath.Requested shower chair no greater than 27\" wide at base at this time from [facility] per request of pt/dtr-in-law as she is still able to stand and amb  -training in purpose of bedrail and this was requested as well.  -requested 4ww with seat on behalf of PT for pt as well  *provided dtr-in-law Audrey avelar as contact per pt request as she may  DME to expedite pt getting it instead of delivery from [facility]  -training to encourage pt to allow and ask family to assist with laundry to avoid going to basement which puts her at fall risk, to have help with meal prep, cleaning, shopping, and other household chores due to fall risk.    Response to treatment/recommendations: receptive, fatigued, increased time to type and respond.    Goal attainment:  All goals met    Risks and benefits of evaluation/treatment have been explained.  Patient, " family and/or caregiver are in agreement with Plan of Care.     Timed Code Treatment Minutes: 15  Total Treatment Time (sum of timed and untimed services): 30 min    Signature: ALEAH Kiran/LINNEA  Date: 4/28/2022

## 2022-04-28 NOTE — PROGRESS NOTES
"                                                                           [facility]    OUTPATIENT PHYSICAL THERAPY CLINIC NOTE  Bailey Malhotra  YOB: 1955  [id number]    Type of visit:         Evaluation     Date of service: 4/28/2022    Referring provider: Dr. Phi Perkins     present: No  Language: English    Others present at visit:  Daughter in lawAudrey    Medical diagnosis:   Amyotrophic lateral sclerosis (ALS)    Date of diagnosis: 4/7/2021    Pertinent history of current problem (include personal factors and/or comorbidities that impact the plan of care):  Pt. Is a 68 yo woman with a hx of progressive bulbar palsy with symptom onset of dysphagia 2020 and pseudobulbar affect       Cardio-respiratory status:  Forced vital capacity: 40 % of predicted   MIP -16    Height/Weight: 5' 4\" / 126 lbs    Living environment:  House    Living environment barriers:  4 stairs to enter (1 railing present)-              Laundry basement R side rail going up              Tub/shower with shower doors and grab bars              Regular height toilet with grab bar     Current assistance/living environment:  Lives alone, has dtr in law and son that are supportive      Current mobility equipment:  4 wheeled walker with seat, which is too large for her and to fit in all areas of her house     Current ADL equipment:  Refer to OT evaluation    Technology used: Ipad    Patient concerns/goals: head drop per MD, weakness now using walker, completing ADL but slowly. Lives alone and does not want to move in with family, has fallen a few times but has not fallen when using her walker.     Evaluation   Interview completed.   Pain assessment:  Pain denied     Range of motion: LE grossly WFL.     Manual muscle testing:  B LE grossly 4/5 throughout   Gait:  Ambulated with slow giana and very small step length without AD.  Faster giana and increased step length with 4WW.  25' timed " walk: 22 steps, 14 sec with 4WW.    Cognition:  See OT evaluation.    5 Times Sit to Stand: unable to perform   TU seconds.     Fall Risk Screen:   Has the patient fallen 2 or more times in the last year? Yes      Has the patient fallen and had an injury in the past year? Yes       Timed Up and Go Score: 21 seconds    Is the patient a fall risk? Yes, department fall risk interventions implemented     Impairments:  Fatigue  Muscle atrophy  Balance  Impaired posture     Treatment diagnosis:  Impaired mobility    Clinical Presentation: Evolving/Changing  Clinical Presentation Rationale: clinical judgement  Clinical Decision Making (Complexity): Low complexity     Recommendations/Plan of care:  Patient would benefit from interventions to enhance safety and independence.  Rehab potential good for stated goals.  Physical therapy intervention for  therapeutic activities and gait training .  1 session evaluation & treatment.  Recommend referral to home PT evaluation.  Equipment recommended: manual wheelchair.     Goals:   Target date: 2022  Patient, family and/or caregiver will verbalize understanding of evaluation results and implications for functional performance.  Patient, family and/or caregiver will verbalize/demonstrate understanding of compensatory methods /equipment to enhance functional independence and safety.  Patient, family and/or caregiver will verbalize energy management techniques appropriate for status and setting.      Treatment provided this date:   Therapeutic activities, 13 minutes: provided patient education regarding potential use of manual wheelchair for community distances for energy conservation and not performing tasks such as removal of garbage and laundry as these require stairs and can be performed by family.   Gait training, 14 minutes: ambulated with and without 4WW.  Provided education regarding safety/efficiency of gait with 4WW.  Provided specific information regarding TUG time,  posture and gait pattern with and without 4WW and relating to falls risk and energy conservation.     Response to treatment/recommendations: patient verbalized understanding.     Goal attainment:  All goals met     Risks and benefits of evaluation/treatment have been explained.  Patient, family and/or caregiver are in agreement with Plan of Care.     Timed Code Treatment Minutes: 27  Total Treatment Time (sum of timed and untimed services): 42    Signature: Earle Mcgraw, PT   Date: 4/28/2022

## 2022-04-29 NOTE — PROGRESS NOTES
Social Work -ALS Clinic Progress Note  [facility]    Data/Intervention:    Patient Name:  Bailey Malhotra  /Age:  1955 (67 year old)    Visit Type: in person    Collaborated With:    -Bailey and her dtr in law Audrey Malhotra  -Dr Perkins and members of the ALS interdisciplinary team    Psychosocial info/Concerns:   Joined 's Maddie and Navid for a discussion with Pt about her goals of care. Her dtr carroll law [place] was with her and she verified that what Bailey communicated today is consistent with what she has relayed to her family. Gtube was explained and she denied any interest in that. She communicated that she does not want any interventions and communicated non verbally, looking up and putting her hands together in prayer, that she is prepared to die and doesn't want her life prolonged. She uses few words, even on her ipad.  Bailey was ready for her appt to be done just after it started today. She doesn't leave the house very often, preferring to be at home and this behavior is her norm according to Audrey.   Pt is currently living alone. Her son Nghia who was living there moved out. It was difficult for him to see his Mother changing.  He may move back in.     Intervention/Education/Resources Provided:  Addressed hospice and what is provided. Pt may be interested. She had our ALS rehab staff to see her yet and recommendations from them re whether any skilled home care was needed. After their visit, they did recommend that she have one PT visit by [facility] sponsored PT, Tamie Sheppard.   We discussed that she will need a primary caregiver and living alone moving forward is not advised. She will need the assistance from a caregiver in helping her and directing her care. She is unable to communicate verbally which adds an additional safety issue. She does have an Emergency response button that she wears at home.   They wanted legal info as Bailey has not completed a health care directive, POA$ and  Will. I will provide a health care directive and assistance with that if she wishes.   Audrey indicated that they have offered to have Bailey live with them but she has been reluctant.   We discussed services from [facility] thru the [facility] program. Audrey will contact them re completing a [facility] assessment.      Assessment/Plan:  Pt/family need to do more planning for her end of life care and maybe the discussion today will help move that forward. I will f/u with Pt/Audrey re plans and whether she is ready to be referred into a hospice program.     Provided patient/family with contact information and encouraged them to contact me between clinic visits as needed.     NEW Santana, [facility]    [facility]  [phone]

## 2022-05-06 PROBLEM — E83.42 HYPOMAGNESEMIA: Status: ACTIVE | Noted: 2022-01-01

## 2022-05-06 PROBLEM — E87.6 HYPOKALEMIA: Status: ACTIVE | Noted: 2022-01-01

## 2022-05-06 PROBLEM — M62.81 GENERALIZED MUSCLE WEAKNESS: Status: ACTIVE | Noted: 2022-01-01

## 2022-05-06 NOTE — H&P
ADMISSION HISTORY & PHYSICAL        Patient YOB: 1955  Admit date: 5/5/2022  Date of Service: 5/6/2022    ASSESSMENT AND PLAN:  67 year old female presenting with poor oral intake and generalized body weakness of 2 days duration.  Patient's past medical history significant for ALS, progressive bulbar palsy, solitary pulmonary nodule.    Dysphagia, failure to thrive  -Patient has had difficulty swallowing solid food for a while until 2 days ago at which time she was unable to swallow liquids.  Patient has a known history of ALS for the past 2 years with progressive bulbar palsy.   -Patient would like to get a feeding tube.  It appears that previously patient was not interested in getting a feeding tube.  She is also interested in going back home instead of TCU upon discharge.  -Patient has clearly lost significant weight due to poor oral intake ongoing for the past few months.  - SLP,   - IR consult for possible feeding tube placement    ALS  -Diagnosed with ALS around a year ago as per her son  - has history of progressive bulbar palsy with aphasia and dysphagia   -Patient has been taking riluzole, nuedexta since June 2021.  -We will resume meds once patient is able to get a feeding to or is able to swallow  -Patient is unable to speak due to bulbar palsy.  Uses signs and iPad for communication  -Neurology consult     Hypokalemia  -Replace per protocol  -Telemetry monitor    Hypomagnesemia  -Replace per protocol     CODE STATUS:  No Order    Disposition:  -Anticipated Length of Stay in midnights and medical necessity (including a midnight in the Emergency Department after triage if applicable): >2 days     CHIEF COMPLAINT:  Poor oral intake of 2 days duration    HISTORY OF PRESENTING ILLNESS:  Patient is a 67 year old female with PMH significant for ALS, progressive bulbar palsy, solitary pulmonary nodule presented to ED due to above complaints.  History obtained from her son,  the patient and ED  notes.  Patient was seen by neurologist April 28, 2022.  Patient was able to swallow liquids until 2 days ago at which time she is unable to swallow both liquid and solid.  She has lost significant amount of weight over the past few months.  Initially patient was clear about not wanting to get a feeding tube, currently she is reconsidering getting feeding tube.  Patient is unable to speak due to progressive bulbar palsy.  She communicates using sign sent iPad.  No history of fever chills rigors.  Denies having chest pain palpitation.  Denies having change in bowel or bladder habits.  Patient will be admitted for management of  dysphagia    PMH/PSH:  Patient Active Problem List   Diagnosis     Abnormal CT scan, neck     Hematuria     OAB (overactive bladder)     Second degree uterine prolapse     Solitary pulmonary nodule     ALS (amyotrophic lateral sclerosis) (H)     Hypokalemia     Hypomagnesemia     Generalized muscle weakness       Past Medical History:   Diagnosis Date     Cholelithiasis      GERD (gastroesophageal reflux disease)         Past Surgical History:   Procedure Laterality Date     TUBAL LIGATION            ALLERGIES:  No Known Allergies    MEDICATIONS:  Reviewed.  Current Facility-Administered Medications   Medication     acetaminophen (TYLENOL) tablet 650 mg     dextrose 5% and 0.9% NaCl + KCl 20 mEq/L infusion     hydrALAZINE (APRESOLINE) injection 5 mg     ondansetron (ZOFRAN) injection 4 mg     potassium chloride 10 mEq in 100 mL sterile water intermittent infusion (premix)     Current Outpatient Medications   Medication     aspirin (ASA) 81 MG chewable tablet     atropine 1 % ophthalmic solution     calcium carbonate 600 mg-vitamin D 400 units (CALTRATE) 600-400 MG-UNIT per tablet     diphenhydrAMINE-acetaminophen (TYLENOL PM)  MG tablet     multivitamin, therapeutic with minerals (THERA-VIT-M) TABS tablet     NUEDEXTA 20-10 MG capsule     riluzole (RILUTEK) 50 MG tablet     sertraline  (ZOLOFT) 50 MG tablet       Current Facility-Administered Medications:      acetaminophen (TYLENOL) tablet 650 mg, 650 mg, Oral, Q4H PRN, Burak Patel MD     dextrose 5% and 0.9% NaCl + KCl 20 mEq/L infusion, , Intravenous, Continuous, Pearl De Santiago MD     hydrALAZINE (APRESOLINE) injection 5 mg, 5 mg, Intravenous, Q6H PRN, Burak Patel MD     ondansetron (ZOFRAN) injection 4 mg, 4 mg, Intravenous, Q6H PRN, Burak Patel MD     potassium chloride 10 mEq in 100 mL sterile water intermittent infusion (premix), 10 mEq, Intravenous, Once, Pearl De Santiago MD    Current Outpatient Medications:      aspirin (ASA) 81 MG chewable tablet, Take 81 mg by mouth daily, Disp: , Rfl:      atropine 1 % ophthalmic solution, Take 1-2 drops by mouth, place under tongue or place inside cheek 2 times daily as needed (excess saliva), Disp: 5 mL, Rfl: 1     calcium carbonate 600 mg-vitamin D 400 units (CALTRATE) 600-400 MG-UNIT per tablet, Take 1 tablet by mouth daily, Disp: , Rfl:      diphenhydrAMINE-acetaminophen (TYLENOL PM)  MG tablet, Take 2 tablets by mouth daily, Disp: , Rfl:      multivitamin, therapeutic with minerals (THERA-VIT-M) TABS tablet, Take 1 tablet by mouth daily, Disp: , Rfl:      NUEDEXTA 20-10 MG capsule, Take 1 capsule by mouth See Admin Instructions (Patient not taking: Reported on 4/28/2022), Disp: , Rfl:      riluzole (RILUTEK) 50 MG tablet, Take 1 tablet (50 mg) by mouth every 12 hours (Patient not taking: Reported on 4/28/2022), Disp: 60 tablet, Rfl: 2     sertraline (ZOLOFT) 50 MG tablet, TAKE ONE AND ONE-HALF TABLETS BY MOUTH DAILY, Disp: 45 tablet, Rfl: 2   Scheduled Meds:    potassium chloride  10 mEq Intravenous Once     Continuous Infusions:    dextrose 5% and 0.9% NaCl with potassium chloride 20 mEq       PRN Meds:.acetaminophen, hydrALAZINE, ondansetron    SOCIAL HISTORY:  Social History     Socioeconomic History     Marital status:      Spouse name:  Not on file     Number of children: Not on file     Years of education: Not on file     Highest education level: Not on file   Occupational History     Not on file   Tobacco Use     Smoking status: Current Every Day Smoker     Packs/day: 1.00     Years: 40.00     Pack years: 40.00     Types: Cigarettes     Smokeless tobacco: Never Used     Tobacco comment: 10-15 per night/weekend   Substance and Sexual Activity     Alcohol use: Yes     Comment: Alcoholic Drinks/day: Occasional     Drug use: No     Sexual activity: Not Currently   Other Topics Concern     Parent/sibling w/ CABG, MI or angioplasty before 65F 55M? No   Social History Narrative     Not on file     Social Determinants of Health     Financial Resource Strain: Not on file   Food Insecurity: Not on file   Transportation Needs: Not on file   Physical Activity: Not on file   Stress: Not on file   Social Connections: Not on file   Intimate Partner Violence: Not on file   Housing Stability: Not on file       FAMILY HISTORY:  Family History   Problem Relation Age of Onset     No Known Problems Mother      No Known Problems Father     reviewed    ROS:  12 point review of systems reviewed and is negative except for what has already been mentioned in HPI.       PHYSICAL EXAM:  GENRL: No distress noted.  Patient communicates using science and iPad.  BP (!) 165/93   Pulse 80   Temp 98  F (36.7  C) (Oral)   Resp 20   Wt 57.2 kg (126 lb)   LMP  (LMP Unknown)   SpO2 99%   BMI 21.63 kg/m    I/O last 3 completed shifts:  In: 1400 [I.V.:250; IV Piggyback:1150]  Out: -   I/O this shift:  In: 150 [I.V.:150]  Out: -   HEENT: NC/AT  CHEST: Clear to auscultation bilateral anteriorly, no ronchi or wheezing  HEART: S1S2 normal, regular.   ABDMN: Soft. Non-tender, non-distended.  No guarding or rigidity. Bowel sounds- active  EXTRM: No pedal edema   INTGM: No skin rash, no cyanosis or clubbing  MUSCULOSKELETAL: no joint tenderness or swelling on upper and lower  extremities  NEURO: Alert and oriented. No focal neurological deficit.  Aphasic        DIAGNOSTIC DATA:    Recent Labs   Lab 05/05/22 2236   WBC 5.0   HGB 13.2   HCT 38.7          Recent Labs   Lab 05/06/22  0631 05/05/22 2236   NA  --  138   CO2  --  27   BUN  --  10   PHOS 2.0* 2.8       No results for input(s): INR in the last 168 hours.    Recent Labs   Lab 05/05/22 2236      CO2 27   BUN 10       [unfilled]    No results found.    Patient's new lab studies reviewed personally.  Patient's new radiology reports reviewed personally.  I personally viewed and personally interpreted patient's EKG:     Note created using dragon voice recognition software.  Errors in spelling or words which seems out of context are unintentional.  Sounds alike errors may have escaped editing.     05/06/2022      Pearl De Santiago  [facility]

## 2022-05-06 NOTE — PHARMACY-ADMISSION MEDICATION HISTORY
Pharmacy Note - Admission Medication History    Pertinent Provider Information: Family member, Audrey, reports that she is not sure which of Bailey's medications she is taking.  She does not think that Bailey is currently taking the Nuedexta, but she is likely taking the riluzole and sertraline.  Bailey takes her medications whole in cool whip.       ______________________________________________________________________    Prior To Admission (PTA) med list completed and updated in EMR.       PTA Med List   Medication Sig Last Dose     aspirin (ASA) 81 MG chewable tablet Take 81 mg by mouth daily Unknown at Unknown time     atropine 1 % ophthalmic solution Take 1-2 drops by mouth, place under tongue or place inside cheek 2 times daily as needed (excess saliva) Unknown at Unknown time     calcium carbonate 600 mg-vitamin D 400 units (CALTRATE) 600-400 MG-UNIT per tablet Take 1 tablet by mouth daily Unknown at Unknown time     multivitamin, therapeutic with minerals (THERA-VIT-M) TABS tablet Take 1 tablet by mouth daily Unknown at Unknown time     NUEDEXTA 20-10 MG capsule Take 1 capsule by mouth See Admin Instructions Unknown at Unknown time     riluzole (RILUTEK) 50 MG tablet Take 1 tablet (50 mg) by mouth every 12 hours Unknown at Unknown time     sertraline (ZOLOFT) 50 MG tablet TAKE ONE AND ONE-HALF TABLETS BY MOUTH DAILY Unknown at Unknown time       Information source(s): Family member (Audrey)  Method of interview communication: phone    Summary of Changes to PTA Med List  New: none  Discontinued: Tylenol PM  Changed: none    Patient was asked about OTC/herbal products specifically.  PTA med list reflects this.    In the past week, patient estimated taking medication this percent of the time:  50-90% due to illness.    Allergies were reviewed, assessed, and updated with the patient.      Medications currently not available for use during hospital stay. Family/Patient representative states they will bring Nuedexta and  riluzole to [facility].    The information provided in this note is only as accurate as the sources available at the time of the update(s).    Thank you for the opportunity to participate in the care of this patient.    Bri Flores [facility]  5/6/2022 10:57 AM

## 2022-05-06 NOTE — CONSULTS
"  Interventional Radiology - Consultation Note:  Inpatient - [facility]: Interventional Radiology   [phone]  5/6/2022    Reason for Consult:  \"Feeding tube\"  Requesting Provider:  Pearl De Santiago MD    HPI:  Bailey Malhotra is a 67 year old female PMH ALS, progressive bulbar palsy, solitary pulmonary nodule who presented to the ER 2/2 poor PO intake, weakness.  Dx with hypokalemia, hypomagnesemia.    Evaluated by neurologist 4/28/2022 and VSS completed, per below.      Patient has lost weight over the past few months.  Reconsidering placement of a feeding tube.     IR consultation requested for \"feeding tube\".    No previous stomach surgeries per chart review and in discussion with patient and her son.  She does have a history of GERD for which she previously took Prilosec daily for. She reports that she has not been eating much.    IMAGING:    EXAM: XR ESOPHAGRAM  LOCATION: [facility]  DATE/TIME: 4/5/2021 8:23 AM     INDICATION: Dysphagia with aspiration.  COMPARISON: None.  TECHNIQUE: Routine.     FINDINGS:  FLUOROSCOPIC TIME: .5 minutes.  NUMBER OF IMAGES: 5.     ESOPHAGUS: Mild esophageal stasis with decreased secondary stripping waves. Small amount of gastroesophageal reflux. Otherwise normal esophagram. No stricture, mass lesion, or inflammatory changes.     Narrative & Impression   EXAM: XR SWALLOW STUDY W SPEECH OR OT  LOCATION: [facility]  DATE/TIME: 4/5/2021 8:21 AM     INDICATION: Difficulty swallowing.  COMPARISON: None.     TECHNIQUE: Routine.     FINDINGS:   FLUOROSCOPIC TIME: 0.9 minutes  NUMBER OF IMAGES: 0     Swallow study with Speech Pathology using multiple barium thicknesses.      IMPRESSION:  One episode of minimal transient penetration with thin consistency barium from a straw with consecutive swallows. Otherwise normal swallow. No aspiration with any consistency. See speech pathology report for additional " "details.          NPO Status: MN   Anticoagulation/Antiplatelets/Bleeding tendencies:  None  Antibiotics:  None per MAR, will need Ancef 2 g IV x1 ordered for procedure     REVIEW OF SYSTEMS:  A comprehensive 10-point review of systems was performed. All systems were reviewed and negative with exception to those reported in the HPI.     PAST MEDICAL HISTORY:  Past Medical History:   Diagnosis Date     Cholelithiasis      GERD (gastroesophageal reflux disease)        PAST SURGICAL HISTORY:  Past Surgical History:   Procedure Laterality Date     TUBAL LIGATION         ALLERGIES:  No Known Allergies     MEDICATIONS:  Current Facility-Administered Medications   Medication     acetaminophen (TYLENOL) tablet 650 mg     dextrose 5% and 0.9% NaCl + KCl 20 mEq/L infusion     hydrALAZINE (APRESOLINE) injection 5 mg     ondansetron (ZOFRAN) injection 4 mg     potassium phosphate 9 mmol in D5W intermittent infusion     Current Outpatient Medications   Medication     aspirin (ASA) 81 MG chewable tablet     atropine 1 % ophthalmic solution     calcium carbonate 600 mg-vitamin D 400 units (CALTRATE) 600-400 MG-UNIT per tablet     diphenhydrAMINE-acetaminophen (TYLENOL PM)  MG tablet     multivitamin, therapeutic with minerals (THERA-VIT-M) TABS tablet     NUEDEXTA 20-10 MG capsule     riluzole (RILUTEK) 50 MG tablet     sertraline (ZOLOFT) 50 MG tablet        LABS:  No results found for: INR     Hemoglobin   Date Value Ref Range Status   05/05/2022 13.2 11.7 - 15.7 g/dL Final   ]  Platelet Count   Date Value Ref Range Status   05/05/2022 254 150 - 450 10e3/uL Final     Creatinine   Date Value Ref Range Status   05/05/2022 0.67 0.60 - 1.10 mg/dL Final     Potassium   Date Value Ref Range Status   05/06/2022 2.1 (LL) 3.5 - 5.0 mmol/L Final         EXAM:  BP (!) 157/95   Pulse 80   Temp 98  F (36.7  C) (Oral)   Resp 20   Ht 1.626 m (5' 4\")   Wt 57.2 kg (126 lb)   LMP  (LMP Unknown)   SpO2 99%   BMI 21.63 kg/m  " "  General:  Stable.  In no acute distress.    Neuro:  A&O x 3. Moves all extremities equally.  Resp:  Lungs clear to auscultation bilaterally.  Cardio:  S1S2 and reg, without murmur, clicks or rubs  Abdomen:  Soft, non-distended, non-tender, positive bowel sounds.  Skin:  Without excoriations, ecchymosis, erythema, lesions or open sores on abdomen.    Pre-Sedation Assessment:  Mallampati Airway Classification:  II - Faucial pillars and soft palate may be seen, but uvula is masked by the base of the tongue  Previous reaction to anesthesia/sedation:  No  Sedation plan based on assessment: Moderate (conscious) sedation  ASA Classification: Class 3 - SEVERE SYSTEMIC DISEASE, DEFINITE FUNCTIONAL LIMITATIONS.   Code Status/Advanced care directive: DNR/DNI per discussion with patient and her son    ASSESSMENT:  68 yo F    - Labs reviewed.  Hypokalemic.  No INR on file.  Otherwise appropriate for IR procedure per clinical practice guidelines  - K+ bolus given this AM with lab recheck ordered for 1200  - No previous stomach surgeries  - Hx of GERD RE: Prilosec daily in the past    Consultation requested for \"feeding tube\"    PLAN:    Proceed with percutaneous gastrojejunostomy tube placement, with sedation    - PENDING, INR and K+ rechecks today.  Otherwise, will set up for procedure on Monday.  -----  - Spoke this AM on the phone to ordering provider Dr. De Santiago, who confirmed desire for a percutaneous gastrostomy tube placement  - After discussion with patient and hx of GERD, consented for GJ tube placement.  Spoke with Dr. De Santiago, who agreed with this.   - STAT INR ordered, pending  - K+ infusion running, with recheck pending later today  - Ancef 2 g IV x1 ordered for procedure       Recommendations, its risks/benefits, details and alternatives were discussed with patient and her son, all questions answered and she verbalized understanding. OK to proceed with above recommended radiology procedure.     Thank " you for kindly for this consultation.     Total time spent on the date of the encounter is 30 minutes, including time spent counseling the patient, performing a medically appropriate evaluation, reviewing prior medical history, ordering medications and tests, documenting clinical information in the medical record, and communication of results.    BETTY PARHAM NP  Interventional Radiology  [phone]        E/M codes for reference only:  25558      ADDENDUM:    INR improved, acceptable.  Continues to have hypokalemia.    Will plan for IR intervention 5/9/2022, pending AM ODALYS chart check.    NPO MN 5/9 ordered  Spoke to bedside RN and paged attending FYI.

## 2022-05-06 NOTE — ED NOTES
"[facility] ED Handoff Report    ED Chief Complaint: Weakness    ED Diagnosis:  (E87.6) Hypokalemia  Comment: Potassium protocol  Plan: Replace    (E83.42) Hypomagnesemia  Comment:Magnesium protocol  Plan:Replace    (M62.81) Generalized muscle weakness  Comment:   Plan:       PMH:    Past Medical History:   Diagnosis Date     Cholelithiasis      GERD (gastroesophageal reflux disease)         Code Status:  No Order Pt has POLST that states DNR    Falls Risk: Yes Band: Applied    Current Living Situation/Residence: lives alone     Elimination Status: Continent: wears briefs     Activity Level: 2 assist    Patients Preferred Language:  English but pt is nonverbal.     Needed: No    Vital Signs:  BP (!) 162/89   Pulse 84   Temp 98  F (36.7  C) (Oral)   Resp 29   Ht 1.626 m (5' 4\")   Wt 57.2 kg (126 lb)   LMP  (LMP Unknown)   SpO2 99%   BMI 21.63 kg/m       Cardiac Rhythm: NSR    Pain Score: 0/10    Is the Patient Confused:  No    Last Food or Drink: 5/5/22 at ???    Focused Assessment:  Pt came in with weakness and being unable to eat for 2 days. Found to be low in K, Mg, and Phos. Replacements ordered. Nurse on previous shift attempted to give oral meds and pt was gurgling and choking. IR to place GJ on Monday. Waiting due to electrolyte imbalances. Pt is non-verbal but can communicate some. 2 sons and DIL very involved. Pt usually continent at home, has been incontinent here.    Tests Performed: Done: Labs and Imaging    Treatments Provided:  K, Mg, Phos replacement.     Family Dynamics/Concerns: No    Family Updated On Visitor Policy: Yes    Plan of Care Communicated to Family: Yes    Who Was Updated about Plan of Care: sons        Medications sent with patient: Yes  Covid: asymptomatic , negative    Additional Information:     RN: Mya Negron RN   5/6/2022 1:53 PM       "

## 2022-05-06 NOTE — CONSULTS
"[facility]  Palliative Care Consultation Note    Patient: Bailey Malhotra  Date of Admission:  5/5/2022    Requesting Clinician / Team: Dr. Patel  Reason for consult: Goals of care  \"ALS, dysphagia, goals of care\"    Impression & Recommendations:  GOALS OF CARE: Patient seen as boarder in ED, awaiting inpatient bed availability.  Goals are life prolonging at this time, but will need ongoing discussions with patient and family.  Patient is able to communicate with hands (thumbs up/down) and gestures.  She does have an iPad that she uses for communication but unfortunately was not charged at time of visit.  No family present.  Patient cooperative with some questions, but other times pulled blanket over her head and rolled to side.  Per chart review, patient met with ALS team on 4/28 and stated she did not want g-tube.  However per patient today, hospitalist and daughter Karen, she is now wanting to have G-tube for nutrition and medication administration.  Palliative care will follow up Monday to assist with discussions regarding goals of care and symptom management as needed.     ADVANCE CARE PLANNING:   Advance directive: Daughter doesn't think she has a health care directive.    POLST: Done on 4/28/22, indicating DNR, comfort focused treatments and no artificial feeding.  May need to complete new document if her wishes have changed to wanting tube feeding.  Would complete with family/surrogate decision maker present.   Surrogate decision maker: daughter Radha is primary contact.  She did defer to her sister in law Audrey today (Radha sick with COVID and Audrey attends most appointments with patient).   Code status: Need to clarify with patient and family.     SYMPTOM MANAGEMENT:   Dysphagia-  Patient had previously said she would not want artificial feeding on 4/28/22 (per chart note).  Now she is requesting feeding tube.    -Swallow study pending  -Neurology consult pending  -Recommend " "follow up with ALS provider at [facility] and/or neurology to determine if vital capacity should be tested prior to placement of feeding tube.  Patient has had a significant rapid decline in the past few months, last documented FVC was 40% on 4/28/22   (Symptom-based management of amyotrophic lateral sclerosis - UpToDate): \"for optimal safety and efficacy PEG should be placed before the vital capacity (VC) falls to 50% of predicted and not in the pre-terminal phase, even if symptomatic dysphagia is not present at that time.\"    Per notes from ALS MD Dr. Phi Perkins on 4/28/22:  PFTs   4/7/2021: FVC 69%, MEP 36, MIP -35  9/23/2021: FVC 62% MEP 34 MIP -33  12/30/2021: FVC 36% MEP 31 MIP -25    Generalized weakness, 2/2 ALS  -Recommend PT/OT    Communication barrier-patient unable to speak.  Uses hand gestures.    -Recommend having patient's personal device charged and at bedside (iPad).   -Recommend having family/surrogate decision maker present for discussion regarding care goals.     These recommendations have been discussed with Dr. De Santiago.      Thank you for the opportunity to participate in the care of this patient and family. Our team: will continue to follow.     During regular M-F work hours -- if you are not sure who specifically to contact -- please contact us by calling us directly at the Palliative Care Main Line [phone]    After regular work hours and on weekends/holidays, you can leave a message at [phone]      Summary/HPI:  Bailey Malhotra is a 67 year old female with PMH of ALS, progressive bulbar palsy, dysphagia who presented to the ED on 5/5/22 with weakness.  Admitted to the hospital with dysphagia, failure to thrive, ALS, hypokalemia, hypomagnesemia.    Patient seen by IR and plan is for feeding tube placement on Monday.  Neurology consult pending.      History gathered today from: family/loved ones, medical chart   Per chart review, patient seen in ALS clinic by multidisciplinary team " "on 4/28/22.  Discussed goals of care and she declined G-tube, \"doesn't want her life prolonged\".  Noted that she has had significant decline since 12/2021.  20 lb weight loss over 4 months.   Phone call to daughter Radha (primary contact).  States she is not coming to hospital because she tested positive for COVID today.  She recommended that I call patient's daughter in law Audrey at [phone], as she goes to appointments with Bailey and is most up to date on her current situation at home.  Radha states \"things have progressed so fast\", \"everything has changed\".  Describes that patient has drooling that is uncontrollable, is now incontinent, no longer driving-was driving up until 2 weeks.  She also fell a few weeks ago without injury.  Family tried to help move rugs in the house as they are a trip chelly but patient refused to let them.  Radha states Bailey is \"very stubborn\" and has been resistant to change and people offering help.  She has no home care services in place.  Radha has been helping with laundry.       Today, the patient was seen for:  Goals of care    Prognosis, Goals, & Planning:      Functional Status just prior to hospitalization: 3 (Capable of only limited self-care; needs help with ADLs; in bed/chair >50% of waking hours)      Prognosis, Goals, and/or Advance Care Planning were addressed today: No      Patient's decision making preferences: unable to assess          Patient has decision-making capacity today for complex decisions: Partial (needs assistance with complex decisions)            I have concerns about the patient/family's health literacy today: Unable to assess today           Patient has a completed Health Care Directive: Unknown or unable to assess.    Social:     Living situation: lives alone    Key family / caregivers: 3 children,     Occupational history: worked at [employer]    Current in-home services: none    ROS:  Very limited ROS due to communication " challenges, patient denies pain or shortness of breath     Past Medical History:  Past Medical History:   Diagnosis Date     Cholelithiasis      GERD (gastroesophageal reflux disease)         Past Surgical History:  Past Surgical History:   Procedure Laterality Date     TUBAL LIGATION           Family History:  Family History   Problem Relation Age of Onset     No Known Problems Mother      No Known Problems Father          Allergies:  No Known Allergies     Medications:  I have reviewed this patient's medication profile and medications from this hospitalization.     PERTINENT PHYSICAL EXAMINATION:  Vital Signs: Blood pressure (!) 165/93, pulse 80, temperature 98  F (36.7  C), temperature source Oral, resp. rate 20, weight 57.2 kg (126 lb), SpO2 99 %, not currently breastfeeding.   GENERAL: Lying in bed, NAD, makes eye contact, does not speak    SKIN: Warm and dry   HEENT:moist mucous membranes  LUNGS: Clear to auscultation anterolaterally; non-labored   CARDIAC: RRR  ABDOMINAL: BS (+), soft, non distended, non tender  MUSKL: no gross joint deformities   EXTREMITIES: No edema  NEUROLOGIC: follows commands, moves all extremities    Data reviewed:      Recent Labs   Lab 05/06/22  1159 05/06/22  0631 05/05/22 2236   WBC  --   --  5.0   HGB  --   --  13.2   MCV  --   --  92   PLT  --   --  254   INR 1.01  --   --    NA  --   --  138   POTASSIUM 2.8* 2.1* 2.5*   CHLORIDE  --   --  97*   CO2  --   --  27   BUN  --   --  10   CR  --   --  0.67   ANIONGAP  --   --  14   KEON  --   --  9.6   GLC  --   --  98      Lab Results   Component Value Date    WBC 5.0 05/05/2022    HGB 13.2 05/05/2022    HCT 38.7 05/05/2022     05/05/2022     05/05/2022    POTASSIUM 2.8 (LL) 05/06/2022    CHLORIDE 97 (L) 05/05/2022    CO2 27 05/05/2022    BUN 10 05/05/2022    CR 0.67 05/05/2022    GLC 98 05/05/2022    SED 20 02/19/2021    AST 33 12/30/2021    ALT 40 12/30/2021    ALKPHOS 167 (H) 12/30/2021    BILITOTAL 0.5 12/30/2021     INR 1.01 05/06/2022      Lab Results   Component Value Date    ALBUMIN 3.7 12/30/2021    ALBUMIN 3.8 06/17/2021          ====================================================  TT: I have personally spent a total of 45 minutes on the unit in review of medical record, consultation with the medical providers and assessment of patient today, with more than 50% of this time spent in counseling, coordination of care, and discussion with patient re: diagnostic results,  and development of plan of care as noted above.  ====================================================    CRISTINA Adan, EDILBERTO-BC  Clinical Nurse Specialist  [facility] Palliative [facility]  [phone]

## 2022-05-06 NOTE — ED PROVIDER NOTES
"EMERGENCY DEPARTMENT ENCOUNTER      NAME: Bailey Malhotra  YOB: 1955  MRN: [id number]      FINAL IMPRESSION  1. Hypokalemia    2. Hypomagnesemia    3. Generalized muscle weakness        MEDICAL DECISION MAKING   Pertinent Labs & Imaging studies reviewed. (See chart for details)    Bailey Malhotra is a 67 year old female who presents for evaluation of generalized weakness and decreased PO intake.  Records reviewed.  Patient has a history of ALS and bulbar palsy, and follows with neurology, speech therapy, dietitian.  Recently, she has had more difficulty swallowing food or fluids and she and her family have started to have discussions about whether or not she would like to have a feeding tube or pursue more comfort focused cares.  This evening, she sent a message to her son and daughter-in-law stating that she felt weak.  Daughter-in-law is not sure that she has been eating or drinking much if at all, notes that she also has difficulty with drooling so what she had been attempting p.o. may not have \"made it in.\"  Patient was complaining of some generalized abdominal pain when daughter-in-law arrived at her home but is now feeling more comfortable.  She has chronic weakness in all extremities without recent change.  No known recent fever, cough, vomiting, diarrhea, urinary symptoms; however, history is limited given patient is nonverbal at baseline.  At time my initial exam, she is primarily concerned about the bed being uncomfortable stable. Vitals on arrival stable. Remainder of history and exam, as below.     Orders for basic labs were placed with patient was awaiting evaluation in triage.  BMP notable for significant hypokalemia with potassium of 2.5 but without other significant electrolyte derangement, acidosis, or evidence of acute kidney injury.  Magnesium also low at 1.6.  Given patient's inability to tolerate p.o., plan to replete with IV and obtain EKG.  CBC unremarkable without acute anemia " "or leukocytosis.  Platelets within normal limits.  UA appeared concentrated, consistent with patient's decreased p.o. status.    I reviewed these results with patient and her daughter-in-law during her initial encounter.  Later relatively long discussion about options for further work-up and management going forward.  Patient does not believe she would like to have a feeding tube placed but we have agreed that tonight, will replace electrolytes, provide fluid, and keep her comfortable while family considers options.  Patient was interested in a sleeping medication and something for back pain which she has been experiencing recently.  She reports that she typically swells pills with cool whip.  Unfortunately, we do not have any \"available.  I discussed options for medications to help with sleep with pharmacist.  Hospital formulary does not carry any liquid or dissolvable forms that I believe would be safe for patient to take.  She also did not feel comfortable swallowing any pills.  She was interested in rectal Tylenol.  Pharmacist will mix up a suspension solution with melatonin for patient to try.    EKG revealed T wave inversion anterior leads and slightly more prolonged QTC at 482 but no evidence of acute ischemia or significant change from prior.  Unfortunately, we do not have any beds available at this time so patient will likely need to sleep in the department until we are able to move her upstairs.  She had improvement in pain after a dose of rectal Tylenol and although she was not able to tolerate melatonin, was able to fall asleep and rest comfortably.  I discussed the case with Dr. Patel of hospital medicine team who agreed to facilitate admission.  COVID swab was ordered for hospitalization purposes.  Patient was signed out to [facility] day team pending admission; however hospitalist has taken over management.  No acute events under my care.    Critical care: 60 minutes excluding separately billable " procedures.  Includes bedside management, time reviewing test results, review of records, discussing the case with staff, documenting the medical record and time spent with family members (or surrogate decision makers) discussing specific treatment issues.        ED COURSE  12:31 AM I met with the patient, obtained history, performed an initial exam, and discussed options and plan for diagnostics and treatment here in the ED. PPE worn: surgical mask and gloves.  1:25 AM Discussed care with pharmacist regarding the patient. They will concoct medication for patient.   2:46 AM Patient was not able to tolerate the melatonin medication but is sleeping soundly.  2:51 AM Discussed care with Dr. Patel, hospitalist regarding the patient. They accept the patient for admission.      MEDICATIONS GIVEN IN THE ED  Medications   dextrose 5% and 0.9% NaCl infusion ( Intravenous New Bag 5/6/22 0579)   hydrALAZINE (APRESOLINE) injection 5 mg (has no administration in time range)   acetaminophen (TYLENOL) tablet 650 mg (has no administration in time range)   ondansetron (ZOFRAN) injection 4 mg (has no administration in time range)   0.9% sodium chloride BOLUS (0 mLs Intravenous Stopped 5/5/22 2354)   potassium chloride 10 mEq in 100 mL sterile water intermittent infusion (premix) (0 mEq Intravenous Stopped 5/6/22 0201)   magnesium sulfate 2 g in water intermittent infusion (0 g Intravenous Stopped 5/6/22 0319)   sodium chloride 0.9% infusion ( Intravenous Stopped 5/6/22 0220)   acetaminophen (TYLENOL) Suppository 650 mg (650 mg Rectal Given 5/6/22 0133)   melatonin sublingual tablet 5 mg (5 mg Sublingual Negative 5/6/22 0220)       NEW PRESCRIPTIONS STARTED AT TODAY'S VISIT  New Prescriptions    No medications on file          =================================================================    Chief Complaint   Patient presents with     Generalized Weakness         HPI:    Patient information was obtained from: patient and daughter  in law    Use of : N/A     Bailey Malhotra is a 67 year old female with a pertinent medical history of ALS, Bulbar palsy, chronic back pain, who presents via walk in for evaluation of decreased appetite, and generalized fatigue/weakness.    HPI limited due to nonspeaking patient.     Patient has not been able to eat well for the past 2-3 days. She had a follow up visit with her primary care provider, dietician,  and did a swallow test on 4/28/22. She took about 2 minutes to do one swallow and this is usually due to her Bulbar Palsy. She is usually able to swallow pills with cool whip. She does live at home by herself and her family members are concerned since she did send them a message tonight that she does not feel well. She has been more weak and uses a walker at home. Her family members do check in daily but is unsure if she is being compliant with all her medications. She was initially against a feeding tube but is now considering it. During her follow up visit, she did sign a POLST agreement for comfort care treatment based. She does endorse some mild chest pain and abdominal pain along with generalized numbness and tingling in her arms and legs. She also has chronic back pain. She usually takes a sleeping pill which she is requesting for tonight. Otherwise she denies any associated nausea, vomiting, cough, shortness of breath, fever, chills. No other medical complaints at this time.       RELEVANT HISTORY, MEDICATIONS, & ALLERGIES   Past medical history, surgical history, family history, medications, and allergies reviewed and pertinent noted in HPI. See end of note for comprehensive list.    REVIEW OF SYSTEMS:  Review of Systems   Constitutional: Positive for appetite change (decreased) and fatigue. Negative for chills and fever.   Respiratory: Negative for cough and shortness of breath.    Cardiovascular: Positive for chest pain (mild).   Gastrointestinal: Positive for abdominal pain  (mild). Negative for nausea and vomiting.   Musculoskeletal: Positive for back pain (chronic).   Neurological: Positive for weakness (generalized) and numbness (generalized in arms and legs).   All other systems reviewed and are negative.     ROS limited due to nonspeaking patient.     PHYSICAL EXAM:    Vitals: BP (!) 172/81   Pulse 76   Temp 98  F (36.7  C) (Oral)   Resp 16   Wt 57.2 kg (126 lb)   LMP  (LMP Unknown)   SpO2 99%   BMI 21.63 kg/m     General: Nonverbal, responds to questions with iPad, thumbs up, and nodding/shaking head. Alert and interactive, comfortable appearing.  HENT: Oropharynx without erythema or exudates. MMM.  No external signs of trauma.  Eyes: Pupils mid-sized and equally reactive.   Neck: Full AROM.   Cardiovascular: Regular rate and rhythm. Peripheral pulses 2+ bilaterally.  Chest/Pulmonary: Normal work of breathing. Lung sounds clear and equal throughout, no wheezes or crackles. No chest wall tenderness or deformities.  Abdomen: Soft, nondistended. Nontender without guarding or rebound.  Back/Spine: No CVA or midline tenderness.  Extremities: Normal ROM of all major joints. No lower extremity edema.   Skin: Warm and dry. Normal skin color.   Neuro: Nonverbal.  Generalized weakness all 4 extremities, baseline.  Psych: Normal affect/mood, cooperative.     LAB  Labs Ordered and Resulted from Time of ED Arrival to Time of ED Departure   BASIC METABOLIC PANEL - Abnormal       Result Value    Sodium 138      Potassium 2.5 (*)     Chloride 97 (*)     Carbon Dioxide (CO2) 27      Anion Gap 14      Urea Nitrogen 10      Creatinine 0.67      Calcium 9.6      Glucose 98      GFR Estimate >90     MAGNESIUM - Abnormal    Magnesium 1.6 (*)    ROUTINE UA WITH MICROSCOPIC REFLEX TO CULTURE - Abnormal    Color Urine Yellow      Appearance Urine Turbid (*)     Glucose Urine Negative      Bilirubin Urine Negative      Ketones Urine 20  (*)     Specific Gravity Urine 1.034 (*)     Blood Urine 0.03  mg/dL (*)     pH Urine 6.0      Protein Albumin Urine 70  (*)     Urobilinogen Urine 4.0 (*)     Nitrite Urine Negative      Leukocyte Esterase Urine 500 Familia/uL (*)     Mucus Urine Present (*)     RBC Urine 14 (*)     WBC Urine 25 (*)     Squamous Epithelials Urine 9 (*)    CBC WITH PLATELETS - Normal    WBC Count 5.0      RBC Count 4.20      Hemoglobin 13.2      Hematocrit 38.7      MCV 92      MCH 31.4      MCHC 34.1      RDW 14.7      Platelet Count 254     COVID-19 VIRUS (CORONAVIRUS) BY PCR - Normal    SARS CoV2 PCR Negative     PHOSPHORUS   URINE CULTURE       EKG  Performed at: 6-MAY-2022 0:21  Impression: Normal sinus rhythm. No acute ischemic changes.  T wave inversion anterior leads.  Compared to previous, QT has lengthened, otherwise no significant change.  Rate: 71   Rhythm: Sinus  QRS Interval: 94  QTc Interval: 482  Comparison: 07-APR-2021 08:59    All laboratory and imaging results and EKG's were personally reviewed and interpreted by myself prior to disposition decision.     Comprehensive outline of EPIC chart Hx  PAST MEDICAL HISTORY    Past Medical History:   Diagnosis Date     Cholelithiasis      GERD (gastroesophageal reflux disease)      Past Surgical History:   Procedure Laterality Date     TUBAL LIGATION         CURRENT MEDICATIONS    Current Outpatient Medications   Medication Instructions     aspirin (ASA) 81 mg, Oral, DAILY     atropine 1 % ophthalmic solution 1-2 drops, Oral or Sublingual or Buccal, 2 TIMES DAILY PRN     calcium carbonate 600 mg-vitamin D 400 units (CALTRATE) 600-400 MG-UNIT per tablet 1 tablet, Oral, DAILY     diphenhydrAMINE-acetaminophen (TYLENOL PM)  MG tablet 2 tablets, Oral, DAILY     multivitamin, therapeutic with minerals (THERA-VIT-M) TABS tablet 1 tablet, Oral, DAILY     NUEDEXTA 20-10 MG capsule 1 capsule, SEE ADMIN INSTRUCTIONS     riluzole (RILUTEK) 50 mg, Oral, EVERY 12 HOURS     sertraline (ZOLOFT) 50 MG tablet TAKE ONE AND ONE-HALF TABLETS BY MOUTH  DAILY       ALLERGIES    No Known Allergies    FAMILY HISTORY    Family History   Problem Relation Age of Onset     No Known Problems Mother      No Known Problems Father        SOCIAL HISTORY    Social History     Socioeconomic History     Marital status:    Tobacco Use     Smoking status: Current Every Day Smoker     Packs/day: 1.00     Years: 40.00     Pack years: 40.00     Types: Cigarettes     Smokeless tobacco: Never Used     Tobacco comment: 10-15 per night/weekend   Substance and Sexual Activity     Alcohol use: Yes     Comment: Alcoholic Drinks/day: Occasional     Drug use: No     Sexual activity: Not Currently   Other Topics Concern     Parent/sibling w/ CABG, MI or angioplasty before 65F 55M? No       I, Krystal Bustosg, am serving as a scribe to document services personally performed by Dr. Imani Hooper based on my observation and the provider's statements to me. I, Imani Hooper MD attest that Krystal Lundberg is acting in a scribe capacity, has observed my performance of the services and has documented them in accordance with my direction.    Imani Hooper M.D.  Emergency Medicine  [facility] EMERGENCY DEPARTMENT  [facility] [facility] [id number] [phone]  Dept: [phone]     Imani Hooper MD  05/06/22 0621

## 2022-05-06 NOTE — ED NOTES
Pt did not tolerate modified melatonin formula very well. Small portion of dose administered and then pt refused further.

## 2022-05-06 NOTE — ED NOTES
Pt and son have concerns about pt not getting a bed yet and pt being unable to sleep. Requesting transfer to another facility and a sleeping medication. Advised that I would notify hospitalist.

## 2022-05-06 NOTE — ED TRIAGE NOTES
Pt has hx of ALS and has not been able to eat or drink anything x 2 days and is very weak. Pt is here with daughter because they may be considering a feeding tube.

## 2022-05-06 NOTE — UTILIZATION REVIEW
Admission Status; Secondary Review Determination       Under the authority of the Utilization Management Committee, the utilization review process indicated a secondary review on the above patient. The review outcome is based on review of the medical records, discussions with staff, and applying clinical experience noted on the date of the review.     (x) Inpatient Status Appropriate - This patient's medical care is consistent with medical management for inpatient care and reasonable inpatient medical practice.     RATIONALE FOR DETERMINATION     Ms. Malhotra is a 67 you female with a PMH of ALS who presents to the ED with progressive dysphagia and inability to eat.  She has severe hypokalemia and significant hypomagnesia and hypophosphatemia that are contributing to her presenting symptoms.  IR has been consulted for placement of a feeding tube.  She is requiring ongoing inpatient medical treatment and close cardiac monitoring.       At the time of admission with the information available to the attending physician more than 2 nights Hospital complex care was anticipated, based on patient risk of adverse outcome if treated as outpatient and complex care required. Inpatient admission is appropriate based on the Medicare guidelines.    The information on this document is developed by the utilization review team in order for the business office to ensure compliance. This only denotes the appropriateness of proper admission status and does not reflect the quality of care rendered.   The definitions of Inpatient Status and Observation Status used in making the determination above are those provided in the CMS Coverage Manual, Chapter 1 and Chapter 6, section 70.4.         Sincerely,     Deborah Mckenzie, DO  Utilization Review  Physician Advisor  [facility].

## 2022-05-07 NOTE — PLAN OF CARE
Problem: Swallowing Impairment  Goal: Optimal Eating and Swallowing Without Aspiration  Outcome: Ongoing, Not Progressing   Goal Outcome Evaluation:        Pt NPO Pt declines any medications orally of food or fluids due to severe dysphagia. Applied Lip moisturizer and offered swabs to clean and moisten mouth. Pt awaiting tube placement.      Problem: Malnutrition  Goal: Improved Nutritional Intake  Outcome: Ongoing, Progressing      Pt receiving electrolyte replacement and IV fluids. Pt turns self in bed. Pt had 1 large inc and declined to use bedpan. Cont to assist  and give reassurance per plan of care.

## 2022-05-07 NOTE — PROVIDER NOTIFICATION
Pt received 2nd phos dose due to protocol being run by ED. Dose scheduled for 1500. Phos level had been ordered at 5pm. Needs to be checked next AM after dose. MD notified.

## 2022-05-07 NOTE — PROGRESS NOTES
"   05/07/22 1400   General Information   Onset of Illness/Injury or Date of Surgery 05/05/22   Pertinent History of Current Problem Per EMR, patient is a  67 year old female presenting with poor oral intake and generalized body weakness of 2 days duration.  Patient's past medical history significant for ALS, progressive bulbar palsy, solitary pulmonary nodule.  Patient has had difficulty swallowing solid food for a while until 2 days ago at which time she was unable to swallow liquids.  Patient has a known history of ALS for the past 2 years with progressive bulbar palsy.   Per consultation with MD, patient will have GJ tube placed on Monday.  Patient has been followed by OP Speech therapy regarding communication and swallow intermittently.  Patient had a video swallow study completed on 4/5/22 with  One episode of minimal transient penetration with thin consistency barium from a straw with consecutive swallows. Otherwise normal swallow. No aspiration with any consistency.  Noted per radiologist report.  Patient most recently seen by OP ST on 4/28/22 with \"Observations: Patient and DIL are pleasant and cooperative. She has decided to not pursue feeding tube and will soon transition to hospice     Current Mode of Nutrition: Oral diet including chicken ignacio, shrimp and noodles, eggs, etc. Continuing thin liquids despite recommendations for thickened.\" noted.   General Observations Patient agreeable to evaluations.  Limited communication, but able to spell in air and utilize gestures/thumbs up successfully.  When asking patient questions to determine goals/preferences for oral intake vs tube feeding, patient spelled \"die\" and pointed up.   Past History of Dysphagia Yes, see above.   Type of Evaluation   Type of Evaluation Swallow Evaluation   Oral Motor   Oral Musculature anomalies present   Dentition (Oral Motor)   Dentition (Oral Motor)   (unable to fully determine)   Facial Symmetry (Oral Motor)   Facial Symmetry " (Oral Motor) WNL   Lip Function (Oral Motor)   Lip Range of Motion (Oral Motor) retraction impairment;protrusion impairment   Tongue Function (Oral Motor)   Tongue ROM (Oral Motor) lateralization is impaired;protrusion is impaired   Cough/Swallow/Gag Reflex (Oral Motor)   Volitional Throat Clear/Cough (Oral Motor) impaired;reduced strength   Comment, Cough/Swallow/Gag Reflex (Oral Motor) Weak volitional throat clear.  Did have x1 strong cough after VFSS.   General Swallowing Observations   Respiratory Support (General Swallowing Observations) none   Current Diet/Method of Nutritional Intake (General Swallowing Observations, NIS) NPO   Swallowing Evaluation Clinical swallow evaluation;Videofluoroscopic swallow study (VFSS)   Clinical Swallow Evaluation   Feeding Assistance minimal assistance required   Clinical Swallow Evaluation Textures Trialed mildly thick liquids   Clinical Swallow Eval: Mildly Thick Liquids   Mode of Presentation cup   Volume Presented 1 sips   Oral Phase premature pharyngeal entry  (anterior loss of bolus, >5 swallows per small sip)   Oral Residue right lip drooling;left lip drooling;right anterior lateral sulci;left anterior lateral sulci   Pharyngeal Phase coughing/choking;repeated swallows   Diagnostic Statement No overt coughing/throat clearing; however, appeared weak throat clear attempt.  Patient appeared uncomfortable and pointed to her throat.  No further bedside trials given.   VFSS Evaluation   Radiologist Luis   Views Taken left lateral   VFSS Textures Trialed mildly thick liquids;moderately thick liquids/liquidized   VFSS Eval: Mildly Thick Liquids   Mode of Presentation cup  (patient took large drink LELAND)   Order of Presentation 1   Oral Phase Premature pharyngeal entry   Pharyngeal Phase Delayed swallow reflex;Residue in valleculae   Rosenbek's Penetration Aspiration Scale 7 - contrast passes glottis, visible subglottic residue remains despite patient's response (aspiration)    Response to Aspiration unproductive reflexive involuntary cough/throat clear   Diagnostic Statement Swallow delayed to the pyriform sinuses.  Deep penetration before the swallow resulting in aspiration, weak throat clear attempted and patient spit out remaining bolus in oral cavity.  Mild aspiration; however, epiglottic undercoating noted with accumulating aspiration/penetration.   VFSS Eval: Moderately Thick Liquids   Mode of Presentation fed by clinician;spoon   Order of Presentation 2   Oral Phase Effortful AP movement;Premature pharyngeal entry   Pharyngeal Phase Delayed swallow reflex;Residue in valleculae   Rosenbek's Penetration Aspiration Scale 7 - contrast passes glottis, visible subglottic residue remains despite patient's response (aspiration)   Response to Aspiration unproductive reflexive involuntary cough/throat clear   Diagnostic Statement Patient with increased aspiration during trial of moderately thick liquids, unable to determine if moderately thick vs residual mildly thick; however, suspect a combination of both.  Moderate residue in valleculae with small spoonful sip.   Swallowing Recommendations   Diet Consistency Recommendations NPO;consider alternative means of nutrition   Medication Administration Recommendations, Swallowing (SLP) Consider alternative forms of nutrition   Instrumental Assessment Recommendations VFSS (videofluoroscopic swallowing study)   General Therapy Interventions   Planned Therapy Interventions Dysphagia Treatment   Dysphagia treatment Modified diet education   Clinical Impression   Criteria for Skilled Therapeutic Interventions Met (SLP Eval) Yes, treatment indicated   Risks & Benefits of therapy have been explained evaluation/treatment results reviewed;care plan/treatment goals reviewed;risks/benefits reviewed;current/potential barriers reviewed;participants voiced agreement with care plan;participants included;patient   Clinical Impression Comments Patient presents  with worsening dysphagia than VFSS completed on 4/5/22.  Aspiration noted with both mildly thick and moderately thick liquids during VFSS on today's date, unable to elicit strong throat clear/cough.  Swallow notable for delayed initiation to the level of the pyriform sinuses, posterior/posterior inferior epiglottic inversion, reduced pharyngeal constriction and hyolaryngeal exercursion.  Adequate elevation.  Patient is high aspiration risk with all intake, would recommend alternative form of nutrition.   SLP Discharge Planning   SLP Discharge Recommendation home with assist;home with outpatient speech therapy  (Follow-up with primary OP Speech therapist)   SLP Rationale for DC Rec Increased dysphagia   SLP Brief overview of current status  Patient with increased dysphagia than past VFSS on 4/5/22, recommend NPO.  Will have GJ tube placed on Monday.    Total Evaluation Time   Total Evaluation Time (Minutes) 40  (20 bedside swallow evaluation, 20 video swallow study)   SLP Goals   Therapy Frequency (SLP Eval) 3 times/wk   SLP Predicted Duration/Target Date for Goal Attainment 05/14/22   SLP Goals SLP Goal 1   SLP: Goal 1 Patient will verbalize understanding of diet recommendations and intake strategies should she pursue any ongoing oral intake.

## 2022-05-07 NOTE — PLAN OF CARE
Problem: Swallowing Impairment  Goal: Optimal Eating and Swallowing Without Aspiration  Outcome: Ongoing, Progressing   Pt denies pain. Turn and repositioned for comfort.   Incontinent x 1.  Drooling noted with cares.   Had nose bleed. Flonase was ordered and given.  Elevated BP, managed with prn hydralazine.  Pt transfer from holding areas to room 227.

## 2022-05-07 NOTE — CONSULTS
Care Management Initial Consult    General Information  Assessment completed with: Patient, Children,  (Karen)  Type of CM/SW Visit: Initial Assessment    Primary Care Provider verified and updated as needed:     Readmission within the last 30 days:        Reason for Consult: discharge planning  Advance Care Planning: Advance Care Planning Reviewed: present on chart          Communication Assessment  Patient's communication style: spoken language (English or Bilingual) (unable to verbalize)    Hearing Difficulty or Deaf: no   Wear Glasses or Blind: no    Cognitive  Cognitive/Neuro/Behavioral: .WDL except  Level of Consciousness: alert  Arousal Level: opens eyes spontaneously  Orientation: oriented x 4  Mood/Behavior: calm, cooperative  Best Language: 3 - Mute  Speech:  (pt is nonverbal)    Living Environment:   People in home: alone     Current living Arrangements: house      Able to return to prior arrangements:  Son plans to move in with her.        Family/Social Support:  Care provided by: self, child(marina)  Provides care for: no one  Marital Status:   Children          Description of Support System: Supportive, Involved         Current Resources:   Patient receiving home care services: No     Community Resources: None  Equipment currently used at home: walker, rolling (per daughter inlaw)  Supplies currently used at home: Incontinence Supplies    Employment/Financial:  Employment Status: retired        Financial Concerns: No concerns identified   Referral to Financial Worker: No       Lifestyle & Psychosocial Needs:  Social Determinants of Health     Tobacco Use: High Risk     Smoking Tobacco Use: Current Every Day Smoker     Smokeless Tobacco Use: Never Used   Alcohol Use: Not on file   Financial Resource Strain: Not on file   Food Insecurity: Not on file   Transportation Needs: Not on file   Physical Activity: Not on file   Stress: Not on file   Social Connections: Not on file   Intimate Partner  Violence: Not on file   Depression: Not at risk     PHQ-2 Score: 2   Housing Stability: Not on file       Functional Status:  Prior to admission patient needed assistance:   Dependent ADLs:: Ambulation-walker  Dependent IADLs:: Laundry, Shopping, Transportation       Mental Health Status:  Mental Health Status: No Current Concerns       Chemical Dependency Status:  Chemical Dependency Status: No Current Concerns             Values/Beliefs:  Spiritual, Cultural Beliefs, Advent Practices, Values that affect care:                 Additional Information:  SWCM met and introduced self and CM services to Pt. Pt is not able to verbally communicate.  SWCM tried to ask Pt yes and no questions.  Pt gave permission to talk with her daughter.  SWCM called and spoke with Pt's daughter Kraen.  Pt lives alone in her home and was managing mostly by herself. Pt uses a walker and able to dress self. Radha takes Pt's laundry as it is in the basement and does her shopping.  Karen states that Pt wants to go home and that Pt's son plans to move in with her. CM to follow for recommendations.       EVELIA Mccarty

## 2022-05-07 NOTE — PROGRESS NOTES
Progress Note    Date of admission: 5/05/2022    Assessment/Plan  Bailey Malhotra is a 67 year old female presenting with poor oral intake and generalized body weakness of 2 days duration.  Patient's past medical history significant for ALS, progressive bulbar palsy, solitary pulmonary nodule.     Dysphagia, failure to thrive  -Patient has had difficulty swallowing solid food for a while until 2 days ago at which time she was unable to swallow liquids.  Patient has a known history of ALS for the past 2 years with progressive bulbar palsy.   -Patient would like to get a feeding tube.  It appears that previously patient was not interested in getting a feeding tube.  She is also interested in going back home instead of TCU upon discharge.  -Patient has clearly lost significant weight due to poor oral intake ongoing for the past few months.  - SLP, MBS done today with result of Aspiration with mildly thickened liquid.  - IR consult for possible feeding tube placement  - GJ tube placement scheduled for Monday a.m.     ALS  -Diagnosed with ALS around a year ago as per her son  - has history of progressive bulbar palsy with aphasia and dysphagia   -Patient has been taking riluzole, nuedexta since June 2021.  -We will resume meds once patient is able to get a feeding to or is able to swallow  -Patient is unable to speak due to bulbar palsy.  Uses signs and iPad for communication  -Neurology consult appreciated     Hypokalemia  -Replace per protocol  -Telemetry monitor     Hypomagnesemia  -Replace per protocol      Hypophosphatemia  -Replace per protocol    DVT PPX : Lovenox    Barriers to discharge: feeding tube    Anticipated Discharge date  : 3-5 days    Subjective  Aphasic, no distress noted.  No new complaints.  Overnight events noted.  Management discussed with RN.      Objective  Vital signs in last 24 hours  Temp:  [97.9  F (36.6  C)-98.4  F (36.9  C)] 98.2  F (36.8  C)  Pulse:  [79-89] 79  Resp:  [17-18] 18  BP:  (129-190)/(77-98) 151/89  SpO2:  [98 %-99 %] 99 %    Input and Output in 24 hrs     Intake/Output Summary (Last 24 hours) at 5/7/2022 1433  Last data filed at 5/6/2022 2157  Gross per 24 hour   Intake 900 ml   Output --   Net 900 ml       Physical Exam:  GEN: Alert . Not in acute distress, aphasic  HEENT: Atraumatic    Pupils- round and reactive to light bilaterally   Neck- supple, no JVP elevation, no lymphadenopathy or thyromegaly   Sclera- anicteric   Mucous membrane- moist and pink  CHEST: Clear to auscultation bilaterally  HEART: S1S2 regular. No murmurs, rubs or gallops  ABDOMEN: Soft. Non-tender, non-distended. No organomegaly. No guarding or rigidity. Bowel sounds- active  Extremities: No pedal oedema  CNS: Aphasic, no motor deficits noted  SKIN: No skin rash, no cyanosis or clubbing      Pertinent Labs   Lab Results: Personally Reviewed    Recent Results (from the past 24 hour(s))   Phosphorus    Collection Time: 05/06/22  5:08 PM   Result Value Ref Range    Phosphorus 2.4 (L) 2.5 - 4.5 mg/dL   Potassium    Collection Time: 05/07/22  1:12 AM   Result Value Ref Range    Potassium 3.0 (L) 3.5 - 5.0 mmol/L   Magnesium    Collection Time: 05/07/22  6:33 AM   Result Value Ref Range    Magnesium 1.7 (L) 1.8 - 2.6 mg/dL   Phosphorus    Collection Time: 05/07/22  6:33 AM   Result Value Ref Range    Phosphorus 2.2 (L) 2.5 - 4.5 mg/dL   Potassium    Collection Time: 05/07/22  6:33 AM   Result Value Ref Range    Potassium 3.5 3.5 - 5.0 mmol/L   Extra Purple Top Tube    Collection Time: 05/07/22  6:33 AM   Result Value Ref Range    Hold Specimen JIC        Pertinent Radiology   Radiology Results reviewed      EKG Results reviewed       Advanced Care Planning      Pearl De Santiago MD   [facility]

## 2022-05-07 NOTE — PLAN OF CARE
Goal Outcome Evaluation:    Pain 5/10 in right arm was reported but patient did not want anything for the pain. Patient was often upset about sharing a room because it woke her up when I was needing to enter the room to for cares of the other patient. Patient received potassium replacement IV per RN managed replacement. Recheck for 0800.    Rosita Pena, RN     Problem: Plan of Care - These are the overarching goals to be used throughout the patient stay.    Goal: Optimal Comfort and Wellbeing  Outcome: Ongoing, Progressing  Intervention: Monitor Pain and Promote Comfort  Recent Flowsheet Documentation  Taken 5/7/2022 0025 by Rosita Pena, RN  Pain Management Interventions: medication offered but refused     Problem: Malnutrition  Goal: Improved Nutritional Intake  Outcome: Ongoing, Progressing

## 2022-05-07 NOTE — CONSULTS
NEUROLOGY CONSULTATION NOTE     Bailey Malhotra,  1955, MRN [id number] Date: 2022     [facility]   Code status:  No Order   PCP: Daniel Fortune, [phone]      ASSESSMENT & PLAN   Diagnosis code: ALS, bulbar palsy, pseudobulbar affect.    67-year-old woman with ALS, presenting with worsening dysphagia.  Additional history of pseudobulbar affect, followed by Dr. Perkins in neuromuscular clinic.    Electrolyte correction per primary team.  Resume riluzole and Nuedexta when able.  Agree with palliative care consultation.  Neurology will follow along.       Chief Complaint   Patient presents with     Generalized Weakness        HISTORY OF PRESENT ILLNESS     We have been requested by Dr. De Santiago to evaluate Bailey Malhotra who is a 67 year old female for ALS.  She presented with failure to thrive, poor oral intake.    Per admission note:  ALS  -Diagnosed with ALS around a year ago as per her son  - has history of progressive bulbar palsy with aphasia and dysphagia   -Patient has been taking riluzole, nuedexta since 2021.  -We will resume meds once patient is able to get a feeding to or is able to swallow  -Patient is unable to speak due to bulbar palsy.  Uses signs and iPad for communication    The patient follows with Dr. Perkins in the neuromuscular clinic.  She was seen by him just over a week ago.  His note indicates that she was coughing but not having any problems with choking.  She was eating chicken, shrimp, pasta and scrambled eggs.  She expressed very clearly to him that she did not want a feeding tube.  She wanted to continue to live at home, rather than move in with family.  He started her on riluzole and neudexta in 2021.  POLST was completed at that visit.  PT/OT recommended.  She failed glycopyrrolate, and there was discussion about doing atropine instead.  She did not want a off assist device at that time.    History and physical indicates the patient is reconsidering  getting a feeding tube.    Bailey mimes to me. Her iPad is currently low on batteries so the nurse wants to get discharged once they get her settled in her room.  She denies pain.  She nods when I ask why she is wearing an apron, if it is for her drooling.       PAST MEDICAL & SURGICAL HISTORY     Medical History  Past Medical History:   Diagnosis Date     Cholelithiasis      GERD (gastroesophageal reflux disease)      Surgical History  Past Surgical History:   Procedure Laterality Date     TUBAL LIGATION          SOCIAL HISTORY     Social History      FAMILY HISTORY     Reviewed, and family history includes No Known Problems in her father and mother.     ALLERGIES     No Known Allergies     REVIEW OF SYSTEMS     Review of systems not obtained due to patient factors.     HOME & HOSPITAL MEDICATIONS     Prior to Admission Medications  Medications Prior to Admission   Medication Sig Dispense Refill Last Dose     aspirin (ASA) 81 MG chewable tablet Take 81 mg by mouth daily   Unknown at Unknown time     atropine 1 % ophthalmic solution Take 1-2 drops by mouth, place under tongue or place inside cheek 2 times daily as needed (excess saliva) 5 mL 1 Unknown at Unknown time     calcium carbonate 600 mg-vitamin D 400 units (CALTRATE) 600-400 MG-UNIT per tablet Take 1 tablet by mouth daily   Unknown at Unknown time     multivitamin, therapeutic with minerals (THERA-VIT-M) TABS tablet Take 1 tablet by mouth daily   Unknown at Unknown time     NUEDEXTA 20-10 MG capsule Take 1 capsule by mouth See Admin Instructions   Unknown at Unknown time     riluzole (RILUTEK) 50 MG tablet Take 1 tablet (50 mg) by mouth every 12 hours 60 tablet 2 Unknown at Unknown time     sertraline (ZOLOFT) 50 MG tablet TAKE ONE AND ONE-HALF TABLETS BY MOUTH DAILY 45 tablet 2 Unknown at Unknown time       Hospital Medications    ceFAZolin  2 g Intravenous Pre-Op/Pre-procedure x 1 dose        PHYSICAL EXAM     Vital signs  Temp:  [97.9  F (36.6  C)-98.4  F  (36.9  C)] 97.9  F (36.6  C)  Pulse:  [82-89] 87  Resp:  [17-29] 18  BP: (129-190)/() 180/94  SpO2:  [98 %-99 %] 99 %    Weight:   [unfilled]    General Physical Exam: Patient is alert and oriented x 1. Vital signs were reviewed and are documented in EMR.  Neurological Exam:  Mental status:, Attentive.  Speech: None.  Patient uses gestures to communicate.  Can nod and shake her head.  Seems to comprehend things well.  Cranial nerves: 2-12 intact to go I cannot visualize the palate raise.  Motor: Some muscle atrophy.  Mild weakness around the neck muscles.  General mild weakness throughout (Rworse>L)  Coordination: Normal fine motor movements of the hands.  Gait: Able to walk with standby assist.     DIAGNOSTIC STUDIES     Pertinent Radiology   Radiology Results: Personally reviewed image/s    Swallow Study:  pending    MRI Brain (2.25.21):  IMPRESSION:  CONCLUSION:  1.  Normal Head MRI for age.   2.  No acute infarcts, mass lesions or hemorrhage.     Pertinent Labs   Lab Results: personally reviewed.   Recent Results (from the past 24 hour(s))   Phosphorus    Collection Time: 05/06/22 11:59 AM   Result Value Ref Range    Phosphorus 2.4 (L) 2.5 - 4.5 mg/dL   Potassium    Collection Time: 05/06/22 11:59 AM   Result Value Ref Range    Potassium 2.8 (LL) 3.5 - 5.0 mmol/L   INR    Collection Time: 05/06/22 11:59 AM   Result Value Ref Range    INR 1.01 0.90 - 1.15   Phosphorus    Collection Time: 05/06/22  5:08 PM   Result Value Ref Range    Phosphorus 2.4 (L) 2.5 - 4.5 mg/dL   Potassium    Collection Time: 05/07/22  1:12 AM   Result Value Ref Range    Potassium 3.0 (L) 3.5 - 5.0 mmol/L   Magnesium    Collection Time: 05/07/22  6:33 AM   Result Value Ref Range    Magnesium 1.7 (L) 1.8 - 2.6 mg/dL   Phosphorus    Collection Time: 05/07/22  6:33 AM   Result Value Ref Range    Phosphorus 2.2 (L) 2.5 - 4.5 mg/dL   Potassium    Collection Time: 05/07/22  6:33 AM   Result Value Ref Range    Potassium 3.5 3.5 - 5.0 mmol/L    Extra Purple Top Tube    Collection Time: 05/07/22  6:33 AM   Result Value Ref Range    Hold Specimen JIC      Total time spent for face to face visit, reviewing labs/imaging studies, counseling and coordination of care was: 1 Hour. More than 50% of this time was spent on counseling and coordination of care.    Dragon software used in the dictation of this note.    Riya Staley MD  [facility] - [address], [address]  [place] [id number] [phone]

## 2022-05-08 NOTE — PROGRESS NOTES
NEUROLOGY PROGRESS NOTE     ASSESSMENT & PLAN     Diagnosis: ALS, bulbar palsy, pseudobulbar affect.  Failure to thrive.     67-year-old woman with ALS, presenting with worsening dysphagia.  Additional history of pseudobulbar affect, followed by Dr. Perkins in neuromuscular clinic.  Patient initially did not want a feeding tube but has changed her mind about this.       Electrolyte correction per primary team.    Resume riluzole and Nuedexta when able.    Agree with palliative care consultation.    Plan is for GJ tube placement on 5/9.    Electrolyte management per primary team.    Neurology will follow along.     Neurology Discharge Planning: TBD    Patient Active Problem List    Diagnosis Date Noted     Hypokalemia 05/06/2022     Priority: Medium     Hypomagnesemia 05/06/2022     Priority: Medium     Generalized muscle weakness 05/06/2022     Priority: Medium     ALS (amyotrophic lateral sclerosis) (H) 06/07/2021     Priority: Medium     Hematuria 02/26/2021     Priority: Medium     Formatting of this note might be different from the original.  Created by Conversion       Solitary pulmonary nodule 02/26/2021     Priority: Medium     Formatting of this note might be different from the original.  Created by Conversion       Abnormal CT scan, neck 08/05/2020     Priority: Medium     Last Assessment & Plan:   Formatting of this note might be different from the original.  Asymmetry on neck CT in right tonsillar area. Mild lymphadenopathy also present. Referral placed for evaluation by ENT.       OAB (overactive bladder) 08/28/2017     Priority: Medium     Second degree uterine prolapse 08/22/2016     Priority: Medium     Medical History  Past Medical History:   Diagnosis Date     Cholelithiasis      GERD (gastroesophageal reflux disease)         SUBJECTIVE   No acute events overnight.  Speech language pathology did assess the patient with a swallow study and noted objective worsening of her dysphagia.  Patient is n.p.o.  "until procedure.    Bailey uses gestures to tell me she is doing \"so-so.\"    OBJECTIVE     Vital signs in last 24 hours  Temp:  [97.8  F (36.6  C)-98.5  F (36.9  C)] 98.5  F (36.9  C)  Pulse:  [] 89  Resp:  [16-18] 16  BP: (144-185)/() 172/82  SpO2:  [96 %-100 %] 98 %    Weight:   [unfilled]    Review of Systems   Pertinent items are noted in HPI.    General Physical Exam: Patient is alert and oriented x 3. Vital signs were reviewed and are documented in EMR.  Neurological Exam:  Mental status:, Attentive.  Speech: None. Patient uses gestures to communicate.  Can nod and shake her head.  Seems to comprehend things well.  Cranial nerves: I cannot visualize the palate raise.  She has trouble taking her tongue out at me.  Otherwise 2 through 11 intact.  Motor: Some muscle atrophy.  Mild weakness around the neck muscles.  General mild weakness throughout (Rworse>L).  Coordination: Normal fine motor movements of the hands.  DTRs: Reflexes are brisk throughout.  Toes equivocal.  Gait:  Deferred for safety.           DIAGNOSTIC STUDIES     Pertinent Radiology   Radiology Results: Personally reviewed image/s    XR Swallow:  Swallow study with Speech Pathology using multiple barium thicknesses. Aspiration with mildly thickened liquid. Moderately thick liquid was subsequently given, with aspiration again seen, though it was difficult to tell if the aspiration was from   residuals versus aspiration of the newly given moderate consistency. No other barium given.    Pertinent Labs   Lab Results: personally reviewed.   Recent Results (from the past 24 hour(s))   Magnesium    Collection Time: 05/08/22  5:46 AM   Result Value Ref Range    Magnesium 1.6 (L) 1.8 - 2.6 mg/dL   Comprehensive metabolic panel    Collection Time: 05/08/22  5:46 AM   Result Value Ref Range    Sodium 136 136 - 145 mmol/L    Potassium 3.1 (L) 3.5 - 5.0 mmol/L    Chloride 101 98 - 107 mmol/L    Carbon Dioxide (CO2) 24 22 - 31 mmol/L    Anion Gap " 11 5 - 18 mmol/L    Urea Nitrogen 3 (L) 8 - 22 mg/dL    Creatinine 0.58 (L) 0.60 - 1.10 mg/dL    Calcium 8.9 8.5 - 10.5 mg/dL    Glucose 123 70 - 125 mg/dL    Alkaline Phosphatase 87 45 - 120 U/L    AST 42 (H) 0 - 40 U/L    ALT 17 0 - 45 U/L    Protein Total 7.1 6.0 - 8.0 g/dL    Albumin 3.2 (L) 3.5 - 5.0 g/dL    Bilirubin Total 0.6 0.0 - 1.0 mg/dL    GFR Estimate >90 >60 mL/min/1.73m2   CBC with platelets    Collection Time: 05/08/22  5:46 AM   Result Value Ref Range    WBC Count 5.5 4.0 - 11.0 10e3/uL    RBC Count 4.03 3.80 - 5.20 10e6/uL    Hemoglobin 12.7 11.7 - 15.7 g/dL    Hematocrit 36.4 35.0 - 47.0 %    MCV 90 78 - 100 fL    MCH 31.5 26.5 - 33.0 pg    MCHC 34.9 31.5 - 36.5 g/dL    RDW 14.8 10.0 - 15.0 %    Platelet Count 232 150 - 450 10e3/uL   Phosphorus    Collection Time: 05/08/22  5:46 AM   Result Value Ref Range    Phosphorus 2.6 2.5 - 4.5 mg/dL         HOSPITAL MEDICATIONS       [Held by provider] aspirin  81 mg Oral Daily     ceFAZolin  2 g Intravenous Pre-Op/Pre-procedure x 1 dose     [Held by provider] dextromethorphan-quiNIDine  1 capsule Oral Daily     enoxaparin ANTICOAGULANT  40 mg Subcutaneous Q24H     fluticasone  1 spray Both Nostrils Daily     potassium chloride  10 mEq Intravenous Q1H     [Held by provider] riluzole  50 mg Oral Q12H     [Held by provider] sertraline  25 mg Oral Daily        Total time spent for face to face visit, reviewing labs/imaging studies, counseling and coordination of care was: 30 Minutes. More than 50% of this time was spent on counseling and coordination of care.    Dragon software used in the dictation on this note.    Riya Staley MD  [facility] Neurology [facility] - [address], [address]  [place] [address]

## 2022-05-08 NOTE — PLAN OF CARE
Problem: Plan of Care - These are the overarching goals to be used throughout the patient stay.    Goal: Absence of Hospital-Acquired Illness or Injury  Intervention: Identify and Manage Fall Risk  Recent Flowsheet Documentation  Safety Promotion/Fall Prevention: activity supervised     Problem: Swallowing Impairment  Goal: Optimal Eating and Swallowing  Outcome: Ongoing, Not Progressing   Goal Outcome Evaluation:    Patient has remained comfortable all evening. Son at bedside early evening.  Pleasant and co-operative.   Remains NPO.  Feeding tube scheduled for placement on Monday.  I.V.  Fluids infusing at 50/hr.  VSS.  Afebrile.  Melatonin given at bedtime per request.  No observed to be sleeping.

## 2022-05-08 NOTE — PLAN OF CARE
Goal Outcome Evaluation:    No pain was reported. Patient received PRN atropine. Patient was incontinent of urine x1 and walked to the bathroom x1. BP was recorded to be 185/85. PRN hydralazine was given. Vitals were rechecked twice due to patient moving. BP was 144/80 on second recheck. Patient seems very uncomfortable. RN assisted patient in mouth moistening which seemed to help a little.     Rosita Pena RN       Problem: Plan of Care - These are the overarching goals to be used throughout the patient stay.    Goal: Absence of Hospital-Acquired Illness or Injury  Outcome: Ongoing, Progressing  Intervention: Identify and Manage Fall Risk  Recent Flowsheet Documentation  Taken 5/8/2022 0040 by Rosita Pena RN  Safety Promotion/Fall Prevention:    activity supervised    assistive device/personal items within reach    clutter free environment maintained    fall prevention program maintained    nonskid shoes/slippers when out of bed    patient and family education    safety round/check completed  Intervention: Prevent Skin Injury  Recent Flowsheet Documentation  Taken 5/8/2022 0040 by Rosita Pena RN  Body Position:    turned    side-lying    position changed independently  Intervention: Prevent and Manage VTE (Venous Thromboembolism) Risk  Recent Flowsheet Documentation  Taken 5/8/2022 0040 by Rosita Pena, RN  Activity Management: activity adjusted per tolerance  Goal: Optimal Comfort and Wellbeing  Outcome: Ongoing, Progressing

## 2022-05-08 NOTE — PLAN OF CARE
"Speech Language Therapy Discharge Summary    Reason for therapy discharge:    All goals and outcomes met, no further needs identified.    Progress towards therapy goal(s). See goals on Care Plan in [facility] electronic health record for goal details.  Goals met    Therapy recommendation(s):    No further acute speech therapy recommended at this time.      5/8/22:  Reviewed education/results from VFSS on 5/7/22 with patient and son in room.  Discussed changes in swallow from VFSS on 4/5/22, with high risk of aspiration with all intake at this time.  Patient will have tube feeding placed on Monday 5/9/22.  Discussed recommendations for NPO as safest option at this time.  Asked patient if she was hoping to continue eating or drinking orally for comfort- she responded by shaking her hand side to side as if \"so-so\".  Reviewed risk of aspiration and concern of related illness and recommended that should patient pursue oral intake at discharge, she be fully upright, small spoonful size sips at a time of moderately thick liquids preferably, and thorough oral care prior to PO.  Recommended that patient follow-up with primary outpatient therapist at discharge for ongoing management/guidance of dysphagia/tube feeding.      "

## 2022-05-08 NOTE — PROGRESS NOTES
Progress Note    Date of admission: 5/05/2022    Assessment/Plan  Bailey Malhotra is a 67 year old female presenting with poor oral intake and generalized body weakness of 2 days duration.  Patient's past medical history significant for ALS, progressive bulbar palsy, solitary pulmonary nodule.     Dysphagia, failure to thrive  -Patient has had difficulty swallowing solid food for a while until 2 days ago at which time she was unable to swallow liquids.  Patient has a known history of ALS for the past 2 years with progressive bulbar palsy.   -Patient would like to get a feeding tube.  It appears that previously patient was not interested in getting a feeding tube.  She is also interested in going back home instead of TCU upon discharge.  -Patient has clearly lost significant weight due to poor oral intake ongoing for the past few months.  - SLP, MBS done with Aspiration with mildly thickened liquid.  - IR consult for possible feeding tube placement  - GJ tube placement scheduled for Monday a.m.  - hold lovenox tonight  -Maintenance IV fluid     ALS  -Diagnosed with ALS around a year ago as per her son  - has history of progressive bulbar palsy with aphasia and dysphagia   -Patient has been taking riluzole, nuedexta since June 2021.  -We will resume meds once patient is able to get a feeding to or is able to swallow  -Patient is unable to speak due to bulbar palsy.  Uses signs and iPad for communication  -Neurology consult appreciated  -will resume riluzole and nuedexta when able     Hypokalemia  -Replace per protocol  -Telemetry monitor     Hypomagnesemia  -Replace per protocol      Hypophosphatemia  -Replace per protocol    DVT PPX : Lovenox    Barriers to discharge: feeding tube    Anticipated Discharge date  : 3-5 days    Subjective  Aphasic, no distress noted.  No new complaints.  Overnight events noted.  Management discussed with pt her son Nghia.    Objective  Vital signs in last 24 hours  Temp:  [97.3  F (36.3   C)-98.5  F (36.9  C)] 97.3  F (36.3  C)  Pulse:  [] 95  Resp:  [16-18] 18  BP: (127-185)/() 127/88  SpO2:  [96 %-100 %] 97 %    Input and Output in 24 hrs     Intake/Output Summary (Last 24 hours) at 5/7/2022 1433  Last data filed at 5/6/2022 2157  Gross per 24 hour   Intake 900 ml   Output --   Net 900 ml       Physical Exam:  GEN: Alert . Not in acute distress, aphasic  HEENT: Atraumatic    Pupils- round and reactive to light bilaterally   Neck- supple, no JVP elevation, no lymphadenopathy or thyromegaly   Sclera- anicteric   Mucous membrane- moist and pink  CHEST: Clear to auscultation bilaterally  HEART: S1S2 regular. No murmurs, rubs or gallops  ABDOMEN: Soft. Non-tender, non-distended. No organomegaly. No guarding or rigidity. Bowel sounds- active  Extremities: No pedal oedema  CNS: Aphasic, no motor deficits noted  SKIN: No skin rash, no cyanosis or clubbing      Pertinent Labs   Lab Results: Personally Reviewed    Recent Results (from the past 24 hour(s))   Magnesium    Collection Time: 05/08/22  5:46 AM   Result Value Ref Range    Magnesium 1.6 (L) 1.8 - 2.6 mg/dL   Comprehensive metabolic panel    Collection Time: 05/08/22  5:46 AM   Result Value Ref Range    Sodium 136 136 - 145 mmol/L    Potassium 3.1 (L) 3.5 - 5.0 mmol/L    Chloride 101 98 - 107 mmol/L    Carbon Dioxide (CO2) 24 22 - 31 mmol/L    Anion Gap 11 5 - 18 mmol/L    Urea Nitrogen 3 (L) 8 - 22 mg/dL    Creatinine 0.58 (L) 0.60 - 1.10 mg/dL    Calcium 8.9 8.5 - 10.5 mg/dL    Glucose 123 70 - 125 mg/dL    Alkaline Phosphatase 87 45 - 120 U/L    AST 42 (H) 0 - 40 U/L    ALT 17 0 - 45 U/L    Protein Total 7.1 6.0 - 8.0 g/dL    Albumin 3.2 (L) 3.5 - 5.0 g/dL    Bilirubin Total 0.6 0.0 - 1.0 mg/dL    GFR Estimate >90 >60 mL/min/1.73m2   CBC with platelets    Collection Time: 05/08/22  5:46 AM   Result Value Ref Range    WBC Count 5.5 4.0 - 11.0 10e3/uL    RBC Count 4.03 3.80 - 5.20 10e6/uL    Hemoglobin 12.7 11.7 - 15.7 g/dL     Hematocrit 36.4 35.0 - 47.0 %    MCV 90 78 - 100 fL    MCH 31.5 26.5 - 33.0 pg    MCHC 34.9 31.5 - 36.5 g/dL    RDW 14.8 10.0 - 15.0 %    Platelet Count 232 150 - 450 10e3/uL   Phosphorus    Collection Time: 05/08/22  5:46 AM   Result Value Ref Range    Phosphorus 2.6 2.5 - 4.5 mg/dL   Potassium    Collection Time: 05/08/22 10:08 AM   Result Value Ref Range    Potassium 3.5 3.5 - 5.0 mmol/L       Pertinent Radiology   Radiology Results reviewed      EKG Results reviewed       Advanced Care Planning      Pearl De Santiago MD   [facility]

## 2022-05-08 NOTE — PLAN OF CARE
Problem: Plan of Care - These are the overarching goals to be used throughout the patient stay.    Goal: Absence of Hospital-Acquired Illness or Injury  Outcome: Ongoing, Progressing  Intervention: Identify and Manage Fall Risk  Recent Flowsheet Documentation  Taken 5/8/2022 0932 by Chasity Velásquez, RN  Safety Promotion/Fall Prevention:    activity supervised    assistive device/personal items within reach    chair alarm on  Intervention: Prevent and Manage VTE (Venous Thromboembolism) Risk  Recent Flowsheet Documentation  Taken 5/8/2022 0932 by Chasity Velásquez, RN  Activity Management:    activity adjusted per tolerance    ambulated in room    ambulated to bathroom    up in chair  Pt denied pain during this shift. Assist of 1 with gait belt and walker to the bathroom and up in the chair for 2 hours.  Pt incontinent of bowel; Had medium loose/formed BM.   Writer assisted with april-care and linen change.    Son visited; had questions regarding labs and wanted to speak with MD; Paged; Son reported received call from MD no further questions.     Pt NPO during this shift. Potassium 3.1 IV Potasium replacement given; rechecked potassium 3.5; recheck tomorrow.   Maintenance IV fluid dextrose 5% and 0.9%NS running at 50 ml/hr.       Goal Outcome Evaluation:

## 2022-05-09 NOTE — PLAN OF CARE
"  Problem: Plan of Care - These are the overarching goals to be used throughout the patient stay.    Goal: Absence of Hospital-Acquired Illness or Injury  Intervention: Identify and Manage Fall Risk  Recent Flowsheet Documentation  Taken 5/8/2022 1630 by Sharon Lorenz RN  Safety Promotion/Fall Prevention: activity supervised     Problem: Swallowing Impairment  Goal: Optimal Eating and Swallowing Without Aspiration  Outcome: Ongoing, Not Progressing   Goal Outcome Evaluation:    Patient requested Melatonin early evening. Stated she needs it in order to get some rest.  Became anxious when told it was ordered for bedtime as needed.  MD notified of Patient's request.  Ativan 1mg I.V. ordered x1.    Ativan administered and Patient fell asleep soon.  Slept most of evening.  Checked Patient frequently.  Patient easily aroused.   Wakes up then promptly pulls are blanket up and goes back to sleep.      BP (!) 154/113 (BP Location: Left arm, Patient Position: Semi-Correa's)   Pulse 95   Temp 97.9  F (36.6  C) (Axillary)   Resp 20   Ht 1.626 m (5' 4\")   Wt 57.2 kg (126 lb)   LMP  (LMP Unknown)   SpO2 98%   BMI 21.63 kg/m                          "

## 2022-05-09 NOTE — PLAN OF CARE
Goal Outcome Evaluation:    No pain reported. BP: 195/97. PRN 5mg hydralazine was given. BP: 176/94 upon recheck. Patient received PRN 10mg melatonin from home meds. 1 urine incontinence episode occurred. Mouth care was provided. Pt continues NPO.     Rosita Pena RN     Problem: Plan of Care - These are the overarching goals to be used throughout the patient stay.    Goal: Optimal Comfort and Wellbeing  Outcome: Ongoing, Progressing     Problem: Malnutrition  Goal: Improved Nutritional Intake  Outcome: Ongoing, Progressing     Problem: Swallowing Impairment  Goal: Optimal Eating and Swallowing Without Aspiration  Outcome: Ongoing, Progressing

## 2022-05-09 NOTE — CONSULTS
"CLINICAL NUTRITION SERVICES - ASSESSMENT NOTE     Nutrition Prescription    RECOMMENDATIONS FOR MDs/PROVIDERS TO ORDER:  Continue K replacement, monitor (and replace) Mg and phos  As needed    Malnutrition Status:    Severe in the context of chronic illness    Recommendations already ordered by Registered Dietitian (RD):  TF recommendations documented below    Future/Additional Recommendations:  Monitor (and replace) K, Mg and phos as needed.  Follow TF tolerance and needs     REASON FOR ASSESSMENT  Bailey Malhotra is a/an 67 year old female assessed by the dietitian for Provider Order - for tube feeding.    Pt presenting with poor oral intake and generalized body weakness of 2 days. Past medical history include ALS, progressive bulbar palsy, solitary pulmonary nodule.  Pt with high aspiration risk and speech language therapy recommended NPO as the safest option for her.  She is s/p G-J tube placement for nutrition and medications    NUTRITION HISTORY  Pt uses thumbs up or down to answer questions and uses fingers to indicate time.  She indicates that she has lost about 40 lb in 3 months and has mostly been taking liquids at home.  She nods her head no when asked about nutritional supplements.    CURRENT NUTRITION ORDERS  Diet: Clear liquids    LABS  Labs reviewed: Na 137, K 3, urea nitrogen 4, Cr 0.55, Mg 1.7, phos 2.8, Glu 87    MEDICATIONS  Medications reviewed: potassium chloride, flonase    ANTHROPOMETRICS  Height: 162.6 cm (5' 4\")  Most Recent Weight: 57.2 kg (126 lb)    IBW: 54.5 kg  BMI: Normal BMI  Weight History:   Wt Readings from Last 10 Encounters:   05/05/22 57.2 kg (126 lb)   04/28/22 57.2 kg (126 lb)   12/30/21 66.4 kg (146 lb 6.4 oz)   09/23/21 68.2 kg (150 lb 6.4 oz)   06/17/21 70.3 kg (155 lb)   06/08/21 70.3 kg (155 lb)   04/07/21 72.1 kg (159 lb)   04/07/21 72.1 kg (159 lb)   04/01/21 72.8 kg (160 lb 8 oz)   03/25/21 74.8 kg (165 lb)   Per above data, pt has lost 20 lb in the past 5 months " (13.7%) which is clinically significant and severe    Dosing Weight: 57.2 kg    ASSESSED NUTRITION NEEDS  Estimated Energy Needs: 3227-6504 kcals/day (25 - 30 kcals/kg)  Justification: Maintenance and Repletion  Estimated Protein Needs: 69-86 grams protein/day (1.2 - 1.5 grams of pro/kg)  Justification: Repletion  Estimated Fluid Needs: 4480-3661 mL/day (25 - 30 mL/kg)   Justification: Maintenance    PHYSICAL FINDINGS  See malnutrition section below.  Skin: WDL  Bryce score= 15, nutrition noted as very poor  GI: WDL per nurse  Last BM- not documented yet    MALNUTRITION:  % Weight Loss:  > 10% in 6 months (severe malnutrition)  % Intake:  </= 50% for >/= 1 month (severe malnutrition)  Subcutaneous Fat Loss:  Orbital region moderate depletion  Muscle Loss:  Temporal region moderate depletion, quadricept- severe, calf-severe  Fluid Retention:  None noted    Malnutrition Diagnosis: Severe malnutrition  In Context of:  Chronic illness or disease    NUTRITION DIAGNOSIS  Malnutrition related to chronic illness as evidenced by 13.7% weight loss in 5 months, inadequate oral intake > 1 month and moderate subcutaneous fat loss and moderate to severe muscle loss      INTERVENTIONS  Implementation  Recommend starting with jevity 1.5 at 10 ml/hr x 8 hrs and increase by 10 ml/hr q 8 hrs to a goal rate of 45 ml/hr (plus H2O flushes of 135 ml q 4 hours) to provide: 1080 ml, 1620 Calories, 69 g protein, 233 g CHO, 821 ml fluid (from TF) plus fluid flushes (810  Ml =1631 ml fluid/day) and 23 g fiber  Recommend close monitoring of K, Mg and phosphorus for refeeding syndrome  Would suggest pt stay  For 2+ days to monitor refeeding syndrome and replace electrolytes    Goals  Meet nutritional needs  Normal electrolytes  Tolerate TF  Maintain weight     Monitoring/Evaluation  Progress toward goals will be monitored and evaluated per protocol.

## 2022-05-09 NOTE — PROGRESS NOTES
Pt returned to room 227. Verbal report given staff RN. Site checked. No further concerns at this time.

## 2022-05-09 NOTE — PROGRESS NOTES
Care Management Follow Up    Length of Stay (days): 3    Expected Discharge Date: 05/10/2022     Concerns to be Addressed:    procedure   Patient plan of care discussed at interdisciplinary rounds: Yes    Anticipated Discharge Disposition:  Home with family     Anticipated Discharge Services:  None at this time  Anticipated Discharge DME:  None     Patient/family educated on Medicare website which has current facility and service quality ratings:    Education Provided on the Discharge Plan:    Patient/Family in Agreement with the Plan:      Referrals Placed by CM/SW:    Private pay costs discussed: Not applicable    Additional Information:  CM to follow for medical progression, recommendations and final discharge plan.        Radha Melendez RN

## 2022-05-09 NOTE — PROGRESS NOTES
Interventional Radiology  5/9/2022      GJ placement procedure requested by Pearl De Santiago MD.    Please see full note from LINNEA Magana on 5/6/22 for patient's IR pre-procedure note.    NPO status reviewed midnight.    Clinical notes reviewed    Pertinent medications reviewed: aspirin being held. lovenox held yesterday evening and to be held tonight, last dose 5/7/22 at 2109.      LABS:  CBC RESULTS: Recent Labs   Lab Test 05/08/22  0546   WBC 5.5   RBC 4.03   HGB 12.7   HCT 36.4   MCV 90   MCH 31.5   MCHC 34.9   RDW 14.8         INR   Date Value Ref Range Status   05/06/2022 1.01 0.90 - 1.15 Final      Creatinine   Date Value Ref Range Status   05/09/2022 0.55 (L) 0.60 - 1.10 mg/dL Final     Potassium   Date Value Ref Range Status   05/09/2022 3.0 (L) 3.5 - 5.0 mmol/L Final       PLAN:  GJ tube placement pending review with Dr Alistair Wills    Chart reviewed today and patient appropriate to proceed with above procedure.    Caprice Garcia PA-C  Interventional Radiology  [phone]

## 2022-05-09 NOTE — PROGRESS NOTES
Social Work Follow-Up  [facility]    Data/Intervention:  Patient Name:  Bailey Malhotra  /Age:  1955 (67 year old)    Reason for Follow-Up:  Check in re hospice timing    Collaborated With:    -Audrey Malhotra, Pt's dtr in law    Intervention/Education/Resources Provided:  Planned f/u with YVETTE Ventura re hospice timing. Pt has visit scheduled with Tamie Sheppard this week but Pt is hospitalized at [facility] and is getting a gtube placed today.  Audrey shared info on how that occurred and we concluded that I would f/u with her in a few weeks to see when she would wish to enroll in hospice.   They picked up DME at [facility] and she apparently likes to use her walker. Audrey plans to f/u re the delivery of the cough assist machine.     Assessment/Plan:  Will update Dr Perkins/Lupe Rodríguez LPN re above.    Previously provided patient/family with writer's contact information and availability.       Lian Wolfe, MSW, [facility] Maine Health CareSW    [facility]  [phone]

## 2022-05-09 NOTE — PROGRESS NOTES
Progress Note    Date of admission: 5/05/2022    Assessment/Plan  Bailey Malhotra is a 67 year old female presenting with poor oral intake and generalized body weakness of 2 days duration.  Patient's past medical history significant for ALS, progressive bulbar palsy, solitary pulmonary nodule.     Dysphagia, failure to thrive  -Patient has had difficulty swallowing solid food for a while until 2 days ago at which time she was unable to swallow liquids.  Patient has a known history of ALS for the past 2 years with progressive bulbar palsy.   -Patient would like to get a feeding tube.  Patient was not interested in getting a feeding tube previously.  She is also interested in going back home instead of TCU upon discharge.  - Patient has lost significant weight due to poor oral intake ongoing for the past few months.  - SLP, MBS done with Aspiration with mildly thickened liquid.  - IR consult for possible feeding tube placement  - S/P GJ tube placement today.   - Maintenance IV fluid  - RD consult for initiation of tube feeding.   - pt is at risk of refeeding syndrome    ALS  -Diagnosed with ALS around a year ago as per her son  - has history of progressive bulbar palsy with aphasia and dysphagia   -Patient has been taking riluzole, nuedexta since June 2021.  - Resume oral  meds   -Patient is unable to speak due to bulbar palsy.  Uses signs and iPad for communication  -Neurology consult appreciated  -will resume riluzole and nuedexta when able     Hypokalemia  -Replace per protocol  -Telemetry monitor     Hypomagnesemia  -Replace per protocol      Hypophosphatemia  -Replace per protocol    Severe malnutrition  -In the context of chronic illness  -Management as per RD    DVT PPX : Lovenox    Barriers to discharge: enteral feeding tolerance    Anticipated Discharge date  : 3-5 days    Subjective  Aphasic, no distress noted.  No new complaints.  Overnight events noted.  Management discussed with pt and  her son  Nghia.    Objective  Vital signs in last 24 hours  Temp:  [96.8  F (36  C)-98.8  F (37.1  C)] 97.9  F (36.6  C)  Pulse:  [] 87  Resp:  [12-24] 18  BP: (154-195)/() 164/90  SpO2:  [95 %-100 %] 99 %    Input and Output in 24 hrs     Intake/Output Summary (Last 24 hours) at 5/7/2022 1433  Last data filed at 5/6/2022 2157  Gross per 24 hour   Intake 900 ml   Output --   Net 900 ml       Physical Exam:  GEN: Alert . Not in acute distress, aphasic  HEENT: Atraumatic    Pupils- round and reactive to light bilaterally   Neck- supple, no JVP elevation, no lymphadenopathy or thyromegaly   Sclera- anicteric   Mucous membrane- moist and pink  CHEST: Clear to auscultation bilaterally  HEART: S1S2 regular. No murmurs, rubs or gallops  ABDOMEN: Soft. Non-tender, non-distended. No organomegaly. No guarding or rigidity. Bowel sounds- active  Extremities: No pedal oedema  CNS: Aphasic, no motor deficits noted  SKIN: No skin rash, no cyanosis or clubbing      Pertinent Labs   Lab Results: Personally Reviewed    Recent Results (from the past 24 hour(s))   Magnesium    Collection Time: 05/09/22  5:59 AM   Result Value Ref Range    Magnesium 1.7 (L) 1.8 - 2.6 mg/dL   Phosphorus    Collection Time: 05/09/22  5:59 AM   Result Value Ref Range    Phosphorus 2.8 2.5 - 4.5 mg/dL   Basic metabolic panel    Collection Time: 05/09/22  5:59 AM   Result Value Ref Range    Sodium 137 136 - 145 mmol/L    Potassium 3.0 (L) 3.5 - 5.0 mmol/L    Chloride 102 98 - 107 mmol/L    Carbon Dioxide (CO2) 24 22 - 31 mmol/L    Anion Gap 11 5 - 18 mmol/L    Urea Nitrogen 4 (L) 8 - 22 mg/dL    Creatinine 0.55 (L) 0.60 - 1.10 mg/dL    Calcium 8.8 8.5 - 10.5 mg/dL    Glucose 87 70 - 125 mg/dL    GFR Estimate >90 >60 mL/min/1.73m2   Extra Purple Top Tube    Collection Time: 05/09/22  5:59 AM   Result Value Ref Range    Hold Specimen JIC        Pertinent Radiology   Radiology Results reviewed      EKG Results reviewed       Advanced Care  Planning      Pearl De Santiago MD   [facility]

## 2022-05-09 NOTE — PLAN OF CARE
Problem: Plan of Care - These are the overarching goals to be used throughout the patient stay.    Goal: Readiness for Transition of Care  Outcome: Ongoing, Progressing   Goal Outcome Evaluation:      Patient had GJ tube placed today. Back to P2 @ 1030. Hydralazine IV for 195/90 BP.   Denies pain at this time. Continue to monitor VS. D5NS @ 50cc/hour.

## 2022-05-10 NOTE — PROGRESS NOTES
05/10/22 0840   Quick Adds   Type of Visit Initial Occupational Therapy Evaluation   Living Environment   People in Home alone   Living Environment Comments information gathered per chart review - will confirm as able; son planning on moving in with patient upon d/c   Self-Care   Equipment Currently Used at Home walker, rolling;shower chair   Activity/Exercise/Self-Care Comment ind for BADLs   Instrumental Activities of Daily Living (IADL)   IADL Comments assist for laundry   General Information   Onset of Illness/Injury or Date of Surgery 05/05/22   Referring Physician Pearl De Santiago MD   Patient/Family Therapy Goal Statement (OT) pt writes she wants to go home   Additional Occupational Profile Info/Pertinent History of Current Problem dx of ALS 1 year ago, nonverbal   Existing Precautions/Restrictions fall  (G/J tube)   Cognitive Status Examination   Affect/Mental Status (Cognitive) low arousal/lethargic   Follows Commands follows one-step commands;25-49% accuracy;delayed response/completion;increased processing time needed;repetition of directions required;verbal cues/prompting required   Attention Deficit moderate deficit;arousal/alertness   Pain Assessment   Patient Currently in Pain Yes, see Vital Sign flowsheet   Range of Motion Comprehensive   Comment, General Range of Motion BUE WFL for ADLs but limited AROM at times d/t weakness   Strength Comprehensive (MMT)   Comment, General Manual Muscle Testing (MMT) Assessment BUE significant weakness - R appears weaker than L   Coordination   Gross Motor Coordination impaired   Fine Motor Coordination impaired   Bed Mobility   Bed Mobility supine-sit   Supine-Sit [place] (Bed Mobility) maximum assist (25% patient effort)   Assistive Device (Bed Mobility) bed rails   Transfers   Transfers sit-stand transfer   Sit-Stand Transfer   Sit-Stand [place] (Transfers) maximum assist (25% patient effort);2 person assist   Balance   Balance Assessment  standing balance: static;sitting balance: static   Balance Comments requires consistent min A maintain seated balance d/t significant lean to R, intermittent max required   Activities of Daily Living   BADL Assessment/Intervention lower body dressing   Lower Body Dressing Assessment/Training   [place] Level (Lower Body Dressing) dependent (less than 25% patient effort)   Neuromuscular Re-education   Minutes of Treatment 10   Clinical Impression   Criteria for Skilled Therapeutic Interventions Met (OT) Yes, treatment indicated   OT Diagnosis dec ADL indep   OT Problem List-Impairments impacting ADL problems related to;activity tolerance impaired;balance;communication;coordination;strength;pain;postural control   Assessment of Occupational Performance 5 or more Performance Deficits   Identified Performance Deficits dressing, bed mobility, toileting, grooming, functional transfers, functional mobility, meal preparation   Planned Therapy Interventions (OT) ADL retraining;balance training;bed mobility training;neuromuscular re-education;strengthening;transfer training;home program guidelines;progressive activity/exercise   Intervention Comments trialing OT intervention and will monitor closesly - unsure of patient's potential for improvement   Clinical Decision Making Complexity (OT) moderate complexity   Risk & Benefits of therapy have been explained evaluation/treatment results reviewed;participants included;patient   OT Discharge Planning   OT Discharge Recommendation (DC Rec) Collaborated with OT for updated discharge location   OT Rationale for DC Rec currently requires assist of 2 for any transfers and assist of 1-2 for ADLs   Total Evaluation Time (Minutes)   Total Evaluation Time (Minutes) 10   OT Goals   OT Predicted Duration/Target Date for Goal Attainment 05/17/22   OT Goals Hygiene/Grooming;Toilet Transfer/Toileting;Lower Body Dressing   OT: Hygiene/Grooming supervision/stand-by assist  (while sitting)    OT: Lower Body Dressing Moderate assist  (of 1 person)   OT: Toilet Transfer/Toileting Moderate assist  (of 1 person)

## 2022-05-10 NOTE — PLAN OF CARE
Goal Outcome Evaluation:    6/10 pain was reported in abdomen where G-J was placed. Patient received PRN dilaudid. Patient was up and walked to bathroom x1 with assist of 2 and walker. Patient had 1 incontinent episode and was cleaned. Tube feeding was increased to 20mL/hour per order at 0130. Patient has been sleeping.     Rosita Pena RN     Problem: Plan of Care - These are the overarching goals to be used throughout the patient stay.    Goal: Optimal Comfort and Wellbeing  Outcome: Ongoing, Progressing  Intervention: Monitor Pain and Promote Comfort  Recent Flowsheet Documentation  Taken 5/10/2022 0448 by Rosita Pena RN  Pain Management Interventions:   medication (see MAR)   cold applied   emotional support   quiet environment facilitated     Problem: Muscle Strength Impairment  Goal: Improved Muscle Strength  Outcome: Ongoing, Progressing     Problem: Malnutrition  Goal: Improved Nutritional Intake  Outcome: Ongoing, Progressing

## 2022-05-10 NOTE — PROGRESS NOTES
"NEUROLOGY PROGRESS NOTE     ASSESSMENT & PLAN     Diagnosis: ALS, bulbar palsy, pseudobulbar affect.  Failure to thrive.     67-year-old woman with ALS, presenting with worsening dysphagia.  Additional history of pseudobulbar affect, followed by Dr. Perkins in neuromuscular clinic.  Patient initially did not want a feeding tube but has changed her mind about this.       Patient is s/p tube placement. Nutrition consulted.      Electrolyte correction per primary team.    Resume riluzole and Nuedexta when able.    Electrolyte management per primary team.    Neurology will follow along.      Neurology Discharge Planning: TBD    Patient Active Problem List    Diagnosis Date Noted     Hypokalemia 05/06/2022     Priority: Medium     Hypomagnesemia 05/06/2022     Priority: Medium     Generalized muscle weakness 05/06/2022     Priority: Medium     ALS (amyotrophic lateral sclerosis) (H) 06/07/2021     Priority: Medium     Hematuria 02/26/2021     Priority: Medium     Formatting of this note might be different from the original.  Created by Conversion       Solitary pulmonary nodule 02/26/2021     Priority: Medium     Formatting of this note might be different from the original.  Created by Conversion       Abnormal CT scan, neck 08/05/2020     Priority: Medium     Last Assessment & Plan:   Formatting of this note might be different from the original.  Asymmetry on neck CT in right tonsillar area. Mild lymphadenopathy also present. Referral placed for evaluation by ENT.       OAB (overactive bladder) 08/28/2017     Priority: Medium     Second degree uterine prolapse 08/22/2016     Priority: Medium     Medical History  Past Medical History:   Diagnosis Date     Cholelithiasis      GERD (gastroesophageal reflux disease)         GILMA     Bailey writes to me today \"sent me to have an.\"     OBJECTIVE     Vital signs in last 24 hours  Temp:  [97.7  F (36.5  C)-98.8  F (37.1  C)] 97.8  F (36.6  C)  Pulse:  [] 79  Resp:  " [12-24] 18  BP: (127-208)/() 168/89  SpO2:  [97 %-100 %] 100 %    Weight:   [unfilled]    Review of Systems   Review of systems not obtained due to patient factors.    General Physical Exam: Patient is alert and oriented x 3. Vital signs were reviewed and are documented in EMR.  Neurological Exam:  Mental status:, Attentive.  Speech: None. Patient uses gestures to communicate.  Can nod and shake her head.  Seems to comprehend things well.  Cranial nerves: I cannot visualize the palate raise.  She has trouble taking her tongue out at me.  Otherwise 2 through 11 intact.  Motor: Some muscle atrophy.  Mild weakness around the neck muscles.  General mild weakness throughout (Rworse>L).  Coordination: Normal fine motor movements of the hands.       DIAGNOSTIC STUDIES     Pertinent Radiology   Radiology Results: No new imaging to review.  Pertinent Labs   Lab Results: personally reviewed.   Recent Results (from the past 24 hour(s))   Potassium    Collection Time: 05/09/22  3:55 PM   Result Value Ref Range    Potassium 3.6 3.5 - 5.0 mmol/L   Glucose by meter    Collection Time: 05/09/22  5:04 PM   Result Value Ref Range    GLUCOSE BY METER POCT 84 70 - 99 mg/dL   Magnesium    Collection Time: 05/10/22  6:05 AM   Result Value Ref Range    Magnesium 1.8 1.8 - 2.6 mg/dL   Phosphorus    Collection Time: 05/10/22  6:05 AM   Result Value Ref Range    Phosphorus 2.9 2.5 - 4.5 mg/dL   Basic metabolic panel    Collection Time: 05/10/22  6:05 AM   Result Value Ref Range    Sodium 133 (L) 136 - 145 mmol/L    Potassium 3.4 (L) 3.5 - 5.0 mmol/L    Chloride 101 98 - 107 mmol/L    Carbon Dioxide (CO2) 19 (L) 22 - 31 mmol/L    Anion Gap 13 5 - 18 mmol/L    Urea Nitrogen 6 (L) 8 - 22 mg/dL    Creatinine 0.62 0.60 - 1.10 mg/dL    Calcium 9.0 8.5 - 10.5 mg/dL    Glucose 128 (H) 70 - 125 mg/dL    GFR Estimate >90 >60 mL/min/1.73m2         HOSPITAL MEDICATIONS       aspirin  81 mg Oral Daily     dextromethorphan-quiNIDine  1 capsule Oral  Daily     enoxaparin ANTICOAGULANT  40 mg Subcutaneous Q24H     fluticasone  1 spray Both Nostrils Daily     potassium chloride  10 mEq Intravenous Q1H     riluzole  50 mg Oral Q12H     sertraline  25 mg Oral Daily        Total time spent for face to face visit, reviewing labs/imaging studies, counseling and coordination of care was: 30 Minutes. More than 50% of this time was spent on counseling and coordination of care.    Dragon software used in the dictation on this note.    Riya Staley MD  [facility] - [address], [address]  [place] [address]

## 2022-05-10 NOTE — PROGRESS NOTES
"CLINICAL NUTRITION SERVICES - REASSESSMENT NOTE     Nutrition Prescription    RECOMMENDATIONS FOR MDs/PROVIDERS TO ORDER:  Consider inpatient until pt at goal rate of 45 ml/hr and to replace  Electrolytes if needed.  Consider decreasing IVF as TF increases  Recommend home healthcare educate pt/family on TF regimen  Prior to discharge  Recommend 100 mg thiamine daily due to malnutrition    Malnutrition Status:    Severe in the context of chronic illness    Recommendations already ordered by Registered Dietitian (RD):  Monitor phos, Mg, K and replace as needed    Future/Additional Recommendations:  Monitor TF tolerance, labs, weight     EVALUATION OF THE PROGRESS TOWARD GOALS   Diet: Clear liquid (? Should pt be NPO)  Nutrition Support: TF (s/p G-J tube 5/9) jevity 1.5 [place] currently at 20 ml/hr (will increase to 30 ml/hr at 9:30 am)  H2O flushes 135 ml q 4 hrs  IVF: dextrose 5% and 0.9% NaCl infusion @ 50 ml/hr (=60 g CHO, 204 Calories)  TF at goal rate (45 ml/hr) plus H2O flushes provide:1620 Calories, 69 g Protein, 233 g CHO, 821 ml fluid (from TF) plus H2O flushes (810 ml)=1631 ml/day and 23 g fiber.     ANTHROPOMETRICS  Height: 162.6 cm (5' 4\")  Most Recent Weight: 57.2 kg (126 lb)    IWeight History:   Wt Readings from Last 10 Encounters:   05/05/22 57.2 kg (126 lb)   04/28/22 57.2 kg (126 lb)   12/30/21 66.4 kg (146 lb 6.4 oz)   09/23/21 68.2 kg (150 lb 6.4 oz)   06/17/21 70.3 kg (155 lb)   06/08/21 70.3 kg (155 lb)   04/07/21 72.1 kg (159 lb)   04/07/21 72.1 kg (159 lb)   04/01/21 72.8 kg (160 lb 8 oz)   03/25/21 74.8 kg (165 lb)     PHYSICAL FINDINGS  See malnutrition section below.    GI CONCERNS  Normoactive BS  Per RN pt having pain at G-J tube site  Last BM not documented    LABS  Reviewed: Na 133, K 3.4, urea nitrogen 6, Mg 1.8, phos 2.9, Glu 128    Estimated nutrition needs:  Dosing weight: 57.2 Kg    Estimated energy needs: 8850-3560 Kcal/day (25-30 Kcal/Kg)  Justification: maintenance and " repletion  Estimated protein needs: 69-86 g/day (1.2-1.5 g/Kg)  Justification: repletion  Estimated fluid needs: 4368-9042 ml/day (25-30 ml/Kg)  Justification: maintenance    MEDICATIONS  Reviewed: nuedexta, potassium chloride, rilutek    MALNUTRITION:  % Weight Loss:  > 10% in 6 months (severe malnutrition)  % Intake:  </= 50% for >/= 1 month (severe malnutrition)  Subcutaneous Fat Loss:  Orbital region moderate depletion  Muscle Loss:  Temporal region moderate depletion, Patellar region severe depletion, Anterior thigh region severe depletion and Posterior calf region severe depletion  Fluid Retention:  None noted    Malnutrition Diagnosis: Severe malnutrition  In Context of:  Chronic illness or disease    CURRENT NUTRITION DIAGNOSIS  Malnutrition related to chronic illness as evidenced by 13.7% weight loss in 5 months, inadequate oral intake > 1 month and moderate subcutaneous fat loss and moderate to severe muscle loss      INTERVENTIONS  Implementation  Continue with TF advancement  Replace electrolytes as needed  Recommend home healthcare to educate pt/family on TF regimen prior to discharge    Goals  Electrolytes WNL-not met (K low)  Tolerate TF-progressing  Maintain weight-progressing  BM    Monitoring/Evaluation  Progress toward goals will be monitored and evaluated per protocol.

## 2022-05-10 NOTE — PLAN OF CARE
"  Problem: Plan of Care - These are the overarching goals to be used throughout the patient stay.    Goal: Optimal Comfort and Wellbeing  Outcome: Ongoing, Progressing  Intervention: Monitor Pain and Promote Comfort  Recent Flowsheet Documentation  Taken 5/10/2022 0938 by Liza Trejo RN  Pain Management Interventions:   medication (see MAR)   repositioned   rest   Goal Outcome Evaluation:      Patient has was awake and up in chair for 1 hour. 2 assist transfer. Tolerating JT feeding at 30cc/hr since 0930. Given Zofran IV as was slightly nauseated after being out of bed. No emesis. Oxycodone 5mg given via GT for abdomen discomfort. Patient slept after. Family updated re: patient wrote \"Go to [facility]\". Hospice consult ordered.                "

## 2022-05-10 NOTE — PLAN OF CARE
Problem: Plan of Care - These are the overarching goals to be used throughout the patient stay.    Goal: Optimal Comfort and Wellbeing  Outcome: Ongoing, Progressing  Intervention: Monitor Pain and Promote Comfort  Recent Flowsheet Documentation  Taken 5/9/2022 2157 by Fior Riley RN  Pain Management Interventions:   emotional support   medication offered but refused   repositioned   rest  Taken 5/9/2022 1607 by Fior Riley RN  Pain Management Interventions:   MD notified (comment)   Provider notified (comment)   cold applied   emotional support   rest     Problem: Muscle Strength Impairment  Goal: Improved Muscle Strength  Outcome: Ongoing, Not Progressing     Problem: Malnutrition  Goal: Improved Nutritional Intake  Outcome: Ongoing, Progressing     Problem: Swallowing Impairment  Goal: Optimal Eating and Swallowing Without Aspiration  Outcome: Ongoing, Not Progressing    Patient had G-J placed this AM, at the beginning of this shift patient reporting severe pain 8/10 at tube site; hospitalist paged regarding patient pain, new orders for IV dilaudid and PO oxycodone. Given ice pack and 0.5 mg IV dilaudid for pain 8/10, patient reports improvement in pain level following admin; most recent pain rating 1/10. Patient did not get up out of bed on this shift, instead desired to sleep. Enteral feeding started at 10 ml/hr via J-port at 5:40 pm; no residuals from G port noted at end of shift. Patient has had no PO intake on this shift.     Fior Riley RN 11:45 PM 5/9/2022

## 2022-05-10 NOTE — PROGRESS NOTES
Progress Note    Date of admission: 5/05/2022    Assessment/Plan  Bailey Malhotra is a 67 year old female presenting with poor oral intake and generalized body weakness of 2 days duration.  Patient's past medical history significant for ALS, progressive bulbar palsy, solitary pulmonary nodule.     Dysphagia, failure to thrive  -Patient has had difficulty swallowing solid food for a while until 2 days ago at which time she was unable to swallow liquids.  Patient has a known history of ALS for the past 2 years with progressive bulbar palsy.   -Patient would like to get a feeding tube.  Patient was not interested in getting a feeding tube previously.  She is also interested in going back home instead of TCU upon discharge.  - Patient has lost significant weight due to poor oral intake ongoing for the past few months.  - SLP, MBS done with Aspiration with mildly thickened liquid.  - S/P GJ tube placement on 05/09 by IR  - RD consult for initiation of tube feeding.  Tube feeding running on the slow rate currently.  - pt is at risk of refeeding syndrome  -Monitor electrolytes closely    ALS  -Diagnosed with ALS around a year ago as per her son  -Has history of progressive bulbar palsy with aphasia and dysphagia   -Patient has been taking riluzole, nuedexta since June 2021.  - Resume oral  meds   -Patient is unable to speak due to bulbar palsy.  Uses signs and iPad for communication  -Neurology consult appreciated  -resume riluzole and nuedexta when able  -Therapy recommendations are for patient to go to TCU but patient and family are not interested in TCU.  -Family requesting for hospice care arrangement so that they can take the patient home.  -Hospice care consult request     Hypokalemia  -Replace per protocol  -Telemetry monitor     Hypomagnesemia  -Replace per protocol      Hypophosphatemia  -Replace per protocol    Severe malnutrition  -In the context of chronic illness  -Management as per RD    DVT PPX :  Lovenox    Barriers to discharge: enteral feeding tolerance    Anticipated Discharge date  : 3-5 days    Subjective  Aphasic, no distress noted.  No new complaints.  Overnight events noted.  Management discussed with pt and  her son Nghia.    Objective  Vital signs in last 24 hours  Temp:  [97.7  F (36.5  C)-98  F (36.7  C)] 97.8  F (36.6  C)  Pulse:  [77-97] 79  Resp:  [18] 18  BP: (132-177)/(85-99) 168/89  SpO2:  [97 %-100 %] 100 %    Input and Output in 24 hrs     Intake/Output Summary (Last 24 hours) at 5/7/2022 1433  Last data filed at 5/6/2022 2157  Gross per 24 hour   Intake 900 ml   Output --   Net 900 ml       Physical Exam:  GEN: Alert . Not in acute distress, aphasic  HEENT: Atraumatic    Sclera- anicteric   Mucous membrane- moist and pink  CHEST: Clear to auscultation bilaterally  HEART: S1S2 regular. No murmurs, rubs or gallops  ABDOMEN: Soft. Non-tender, non-distended. No organomegaly. No guarding or rigidity. Bowel sounds- active  Extremities: No pedal oedema  CNS: Aphasic, no motor deficits noted  SKIN: No skin rash, no cyanosis or clubbing      Pertinent Labs   Lab Results: Personally Reviewed    Recent Results (from the past 24 hour(s))   Potassium    Collection Time: 05/09/22  3:55 PM   Result Value Ref Range    Potassium 3.6 3.5 - 5.0 mmol/L   Glucose by meter    Collection Time: 05/09/22  5:04 PM   Result Value Ref Range    GLUCOSE BY METER POCT 84 70 - 99 mg/dL   Magnesium    Collection Time: 05/10/22  6:05 AM   Result Value Ref Range    Magnesium 1.8 1.8 - 2.6 mg/dL   Phosphorus    Collection Time: 05/10/22  6:05 AM   Result Value Ref Range    Phosphorus 2.9 2.5 - 4.5 mg/dL   Basic metabolic panel    Collection Time: 05/10/22  6:05 AM   Result Value Ref Range    Sodium 133 (L) 136 - 145 mmol/L    Potassium 3.4 (L) 3.5 - 5.0 mmol/L    Chloride 101 98 - 107 mmol/L    Carbon Dioxide (CO2) 19 (L) 22 - 31 mmol/L    Anion Gap 13 5 - 18 mmol/L    Urea Nitrogen 6 (L) 8 - 22 mg/dL    Creatinine 0.62  0.60 - 1.10 mg/dL    Calcium 9.0 8.5 - 10.5 mg/dL    Glucose 128 (H) 70 - 125 mg/dL    GFR Estimate >90 >60 mL/min/1.73m2   Extra Purple Top Tube    Collection Time: 05/10/22  6:05 AM   Result Value Ref Range    Hold Specimen JIC    Potassium    Collection Time: 05/10/22 12:43 PM   Result Value Ref Range    Potassium 3.7 3.5 - 5.0 mmol/L       Pertinent Radiology   Radiology Results reviewed      EKG Results reviewed       Advanced Care Planning      Pearl De Santiago MD   [facility]

## 2022-05-10 NOTE — PROGRESS NOTES
"   05/10/22 1015   Quick Adds   Type of Visit Initial PT Evaluation   Living Environment   People in Home alone   Current Living Arrangements house   Self-Care   Equipment Currently Used at Home walker, rolling;wheelchair, manual   Fall history within last six months no   Activity/Exercise/Self-Care Comment Patient able to verbalize through writing and giving thumbs up for yes. Verbalizes agreement that son helps her as needed.   General Information   Onset of Illness/Injury or Date of Surgery 05/06/22   Referring Physician Pearl De Santiago MD   Patient/Family Therapy Goals Statement (PT) None stated.   Pertinent History of Current Problem (include personal factors and/or comorbidities that impact the POC) Per H&P: \"67 year old female presenting with poor oral intake and generalized body weakness of 2 days duration.  Patient's past medical history significant for ALS, progressive bulbar palsy, solitary pulmonary nodule.\"   Existing Precautions/Restrictions fall   Cognition   Cognitive Status Comments Patient able to verbalize through writing and giving thumbs up for \"yes\" or when in agreement.   Range of Motion (ROM)   Range of Motion ROM deficits secondary to weakness   Strength (Manual Muscle Testing)   Strength (Manual Muscle Testing) Deficits observed during functional mobility   Bed Mobility   Bed Mobility sit-supine   Sit-Supine Rooks (Bed Mobility) moderate assist (50% patient effort);2 person assist;verbal cues   Bed Mobility Limitations impaired ability to control trunk for mobility   Impairments Contributing to Impaired Bed Mobility decreased strength;impaired balance   Assistive Device (Bed Mobility) bed rails;draw sheet   Transfers   Transfers sit-stand transfer   Transfer Safety Concerns Noted decreased weight-shifting ability;decreased balance during turns   Impairments Contributing to Impaired Transfers impaired balance;decreased strength   Sit-Stand Transfer   Sit-Stand Rooks " (Transfers) minimum assist (75% patient effort);moderate assist (50% patient effort);2 person assist   Assistive Device (Sit-Stand Transfers) other (see comments)  (arm-in-arm)   Gait/Stairs (Locomotion)   Brunswick Level (Gait) minimum assist (75% patient effort);moderate assist (50% patient effort);2 person assist   Assistive Device (Gait) other (see comments)  (arm-in-arm)   Distance in Feet (Required for LE Total Joints) 3'   Pattern (Gait) step-to   Deviations/Abnormal Patterns (Gait) giana decreased;gait speed decreased   Clinical Impression   Criteria for Skilled Therapeutic Intervention Yes, treatment indicated   PT Diagnosis (PT) impaired functional mobility   Influenced by the following impairments decreased strength, impaired balance   Functional limitations due to impairments transfers, ambulation   Clinical Presentation (PT Evaluation Complexity) Evolving/Changing   Clinical Presentation Rationale Pt presents as medically diagnosed.   Clinical Decision Making (Complexity) moderate complexity   Planned Therapy Interventions (PT) balance training;bed mobility training;gait training;home exercise program;neuromuscular re-education;patient/family education;strengthening;transfer training   Risk & Benefits of therapy have been explained evaluation/treatment results reviewed;participants voiced agreement with care plan;participants included;patient   PT Discharge Planning   PT Discharge Recommendation (DC Rec) Transitional Care Facility   PT Rationale for DC Rec Pt currently requiring assist of 2 for safe transfers.   Total Evaluation Time   Total Evaluation Time (Minutes) 10   Physical Therapy Goals   PT Frequency Daily   PT Predicted Duration/Target Date for Goal Attainment 05/17/22   PT Goals Bed Mobility;Transfers;Gait;Stairs   PT: Bed Mobility Supervision/stand-by assist;Supine to/from sit   PT: Transfers Supervision/stand-by assist;Sit to/from stand;Assistive device   PT: Gait Supervision/stand-by  assist;Assistive device;Rolling walker;50 feet     Bonnie Juarez, PT  5/10/2022

## 2022-05-11 NOTE — PROGRESS NOTES
[facility]    Medicine Progress Note - Hospitalist Service    Date of Admission:  5/5/2022    Assessment & Plan          67 year old female presenting with poor oral intake and generalized body weakness of 2 days duration.  Patient's past medical history significant for ALS, progressive bulbar palsy, solitary pulmonary nodule.     Dysphagia, failure to thrive  ---difficulty swallowing solid food for a while until 2 days prior to admission   ---patient has a known history of ALS for the past 2 years with progressive bulbar palsy.   --Initially resistant to a feeding tube.  Now status post feeding tube placement with GJ  - --Suspect much weakness from malnutrition  - SLP, MBS done with Aspiration with mildly thickened liquid.  - S/P GJ tube placement on 05/09 by IR  - OFELIA following, trial of nighttime tube feeds tonight  --Electrolytes stable  --stop IVF    ALS/pseudobulbar affect  -Diagnosed with ALS around a year ago as per her son  -Has history of progressive bulbar palsy with aphasia and dysphagia   -Patient has been taking riluzole, nuedexta since June 2021. continue  - Resume meds through g tube  -Patient is unable to speak due to bulbar palsy.  Uses signs and iPad for communication  -Neurology consult appreciated  -TCU recommended, family declines  --informational hospice meeting set for tomorrow  --contineu zoloft    Hyponatreia - mild, stop checking    GERD - resumed home prilosec     Hypokalemia  -Replace per protocol  -Telemetry monitor     Hypomagnesemia  -Replace per protocol       Hypophosphatemia  -Replace per protocol     Severe malnutrition  -In the context of chronic illness  -Management as per RD now on tube feeds  --trial night time feeds           Diet: Advance Diet as Tolerated: Clear Liquid Diet  Adult Formula Drip Feeding: Continuous Jevity 1.5; Jejunostomy; Goal Rate: 45; mL/hr; Medication - Feeding Tube Flush Frequency: At least 15-30 mL water before and after  medication administration and with tube clogging; Yes; 10; 10; hr; 8    DVT Prophylaxis: Enoxaparin (Lovenox) SQ  Koehler Catheter: Not present  Central Lines: None  Cardiac Monitoring: None  Code Status: No CPR- Do NOT Intubate      Disposition Plan   Expected Discharge: 05/12/2022     Anticipated discharge location: home with family    Delays: get tube feed supplies           The patient's care was discussed with the Bedside Nurse, Care Coordinator/, Patient and Patient's Family.    Genna Medina MD  Hospitalist Service  [facility]  Text page via [facility] Paging/Directory         Clinically Significant Risk Factors Present on Admission                    ______________________________________________________________________    Interval History   --- Patient seen twice today.  She is able to communicate with me in writing.  She is able to tell me that she wants to go home.  Later came back to discuss things with her son.  He has a son on tube feeds who is n.p.o. so he feels like he is used to dealing with tube feeds.  They want to bring patient home.  They are open for hospice informational session.  --- Family feels patient is much stronger.  She is tolerating 24 hour tube feeds; open to trying nocturnal feeds for home  She denies pain, cough, dyspnea.  Son states rating little prior to admission    Data reviewed today: I reviewed all medications, new labs and imaging results over the last 24 hours.    Physical Exam   Vital Signs: Temp: 98.6  F (37  C) Temp src: Oral BP: (!) 156/75 Pulse: 81   Resp: 18 SpO2: 98 % O2 Device: None (Room air)    Weight: 121 lbs 11.2 oz  General Appearance: Pleasant, aphasic, NAD, communicates with pen  Respiratory: CTA-B  Cardiovascular: RRr, NO MURMERS   GI: G tube in place, no tenderness  Skin:  No breakdown or significant edema  Other: Muscle atrophy noted.  Mild weakness throughout.  No tremors noted.    Data   Recent Labs   Lab 05/11/22  0703  05/10/22  1243 05/10/22  0605 05/09/22  1704 05/09/22  1555 05/09/22  0559 05/08/22  1008 05/08/22  0546 05/07/22  0112 05/06/22  1159 05/06/22  0631 05/05/22  2236   WBC 4.8  --   --   --   --   --   --  5.5  --   --   --  5.0   HGB 12.0  --   --   --   --   --   --  12.7  --   --   --  13.2   MCV 95  --   --   --   --   --   --  90  --   --   --  92     --   --   --   --   --   --  232  --   --   --  254   INR  --   --   --   --   --   --   --   --   --  1.01  --   --    *  --  133*  --   --  137  --  136  --   --   --  138   POTASSIUM 3.5 3.7 3.4*  --    < > 3.0*   < > 3.1*   < > 2.8*   < > 2.5*   CHLORIDE 98  --  101  --   --  102  --  101  --   --   --  97*   CO2 27  --  19*  --   --  24  --  24  --   --   --  27   BUN 8  --  6*  --   --  4*  --  3*  --   --   --  10   CR 0.55*  --  0.62  --   --  0.55*  --  0.58*  --   --   --  0.67   ANIONGAP 7  --  13  --   --  11  --  11  --   --   --  14   KEON 8.7  --  9.0  --   --  8.8  --  8.9  --   --   --  9.6     --  128* 84  --  87  --  123  --   --   --  98   ALBUMIN  --   --   --   --   --   --   --  3.2*  --   --   --   --    PROTTOTAL  --   --   --   --   --   --   --  7.1  --   --   --   --    BILITOTAL  --   --   --   --   --   --   --  0.6  --   --   --   --    ALKPHOS  --   --   --   --   --   --   --  87  --   --   --   --    ALT  --   --   --   --   --   --   --  17  --   --   --   --    AST  --   --   --   --   --   --   --  42*  --   --   --   --     < > = values in this interval not displayed.     No results found for this or any previous visit (from the past 24 hour(s)).

## 2022-05-11 NOTE — PROGRESS NOTES
"[facility] - Palliative Care Daily Progress Note      May 11, 2022      Today, the patient was seen for:Goals of care    Impressions and  Recommendations      1) Goals of Care          Initially discussed goals of care and she initially declined G-tube as she \"doesn't want her life prolonged\".  Noted that she has had significant decline since 12/2021.  20 lb weight loss over 4 months.  She changed her mind and now has Gtube.  She was   at risk of refeeding syndrome.   Family requesting for hospice care arrangement so that they can take the patient home.    -Hospice to see tomorrow at  11 am.    Family planning on bringing her home tomorrow at noon.  I updated Frances Simmons  And confirmed that PEG tube is     Code Status:DNR DNI      2). Advanced Care Planning       Documented Health Care Agent: son Nghia      3) Symptom Management     Dysphagia due to ASL:      Comment:  Now has PEG tube, doing better  Recommendation:    Abdominal discomfort related to recent PEG tube  Comment: , had dilaudid prn, was helpful, tube feeding rate increased to 40 at 1730, tolerating tube feeding well  .  Patient asked for Tylenol today  Recommendations  As needed Tylenol        4) Psychosocial/Spiritual support    Appreciate Palliative Germán Assistance    She receives family/Friend support         Assessments   0    HPI  Bailey Malhotra is a 67 year old female with PMH of ALS for the past 2 years with progressive bulbar palsy, worsening  dysphagia who presented to the ED on 5/5/22 with weakness.  Admitted to the hospital with dysphagia, failure to thrive, ALS, hypokalemia, hypomagnesemia.              Interval History:       Chart review/discussion with unit or clinical team members:   No significant overnight events..         Palliative Overview:     Palliative Encounter Summary/Comments:  Met with patient and her son and grandson. Patient is in good spirits and is writing me notes.  . Her son is saying that she looks so much " "better and he wants to get her home    Prognosis, Goals, or Advance Care Planning was addressed today with: N.  Mood, coping, and/or meaning in the context of serious illness were addressed today: Yes.    Key Palliative Symptoms:difficult to determine given encephalopathy   # Pain severity the last 12 hours: low at PEG site  # Dyspnea severity the last 12 hours: None  # Nausea severity the last 12 hours: none  # Anxiety severity the last 12 hours: none             Review of Systems:       A full 14 point review of systems was obtained and is negative unless noted above:          Medications:     Current Facility-Administered Medications   Medication     acetaminophen (TYLENOL) tablet 650 mg     aspirin (ASA) chewable tablet 81 mg     atropine 1 % ophthalmic solution 1-2 drop     dextromethorphan-quiNIDine (NUEDEXTA) 20-10 MG per capsule 1 capsule     dextrose 10% infusion     dextrose 5% and 0.9% NaCl infusion     enoxaparin ANTICOAGULANT (LOVENOX) injection 40 mg     fluticasone (FLONASE) 50 MCG/ACT spray 1 spray     hydrALAZINE (APRESOLINE) injection 5 mg     HYDROmorphone (PF) (DILAUDID) injection 0.5 mg     LORazepam (ATIVAN) injection 0.5 mg     melatonin tablet 10 mg     naloxone (NARCAN) injection 0.2 mg    Or     naloxone (NARCAN) injection 0.4 mg    Or     naloxone (NARCAN) injection 0.2 mg    Or     naloxone (NARCAN) injection 0.4 mg     ondansetron (ZOFRAN) injection 4 mg     oxyCODONE (ROXICODONE) tablet 5 mg     pantoprazole (PROTONIX) 2 mg/mL suspension 40 mg     riluzole (RILUTEK) tablet 50 mg     sertraline (ZOLOFT) tablet 25 mg        I have reviewed this patient's medication profile and medications during this hospitalization.               Physical Exam:   BP (!) 156/75 (BP Location: Left arm)   Pulse 81   Temp 98.6  F (37  C) (Oral)   Resp 18   Ht 1.626 m (5' 4\")   Wt 55.2 kg (121 lb 11.2 oz)   LMP  (LMP Unknown)   SpO2 98%   BMI 20.89 kg/m      GENERAL: laying in bed pleasant  She uses " gestures (nods and shakes her head)  to communicate. Writing notes  SKIN: Warm and dry   HEENT: Normocephalic, anicteric sclera, moist mucous membranes  LUNGS: clear to auscultation anterolaterally; non-labored   CARDIAC: RRR, No DEREK  ABDOMINAL: BS+, soft, non distended, non tender  PEG  EXTREMITIES: No edema or cyanosis, pulses 2+ and symmetrical  NEUROLOGIC: alert and oriented  PSYCH: calm                Data Reviewed:      Reviewed recent pertinent imaging and labs         TT: I have personally spent a total of 25 minutes on the unit in review of medical record, consultation with the medical providers and assessment of patient today, with more than 50% of this time spent in counseling, coordination of care, and discussion with patient and staff re: prognosis, symptom management, and development of plan of care as noted above    CRISTINA Florentino, NP-C, ACHPN  [facility]  Palliative Medicine  Office: [phone]

## 2022-05-11 NOTE — PROGRESS NOTES
NEUROLOGY PROGRESS NOTE     ASSESSMENT & PLAN     Diagnosis: ALS, bulbar palsy, pseudobulbar affect.  Failure to thrive.     67-year-old woman with ALS, presenting with worsening dysphagia.  Additional history of pseudobulbar affect, followed by Dr. Perkins in neuromuscular clinic.  Patient initially did not want a feeding tube but has changed her mind about this.       Patient is s/p tube placement. Nutrition consulted.      Electrolyte correction per primary team.    Resumed riluzole and Nuedexta.    Electrolyte management per primary team.    Continue Zoloft for mood.    Neurology will follow along.      Neurology Discharge Planning: TBD    Patient Active Problem List    Diagnosis Date Noted     Hypokalemia 05/06/2022     Priority: Medium     Hypomagnesemia 05/06/2022     Priority: Medium     Generalized muscle weakness 05/06/2022     Priority: Medium     ALS (amyotrophic lateral sclerosis) (H) 06/07/2021     Priority: Medium     Hematuria 02/26/2021     Priority: Medium     Formatting of this note might be different from the original.  Created by Conversion       Solitary pulmonary nodule 02/26/2021     Priority: Medium     Formatting of this note might be different from the original.  Created by Conversion       Abnormal CT scan, neck 08/05/2020     Priority: Medium     Last Assessment & Plan:   Formatting of this note might be different from the original.  Asymmetry on neck CT in right tonsillar area. Mild lymphadenopathy also present. Referral placed for evaluation by ENT.       OAB (overactive bladder) 08/28/2017     Priority: Medium     Second degree uterine prolapse 08/22/2016     Priority: Medium     Medical History  Past Medical History:   Diagnosis Date     Cholelithiasis      GERD (gastroesophageal reflux disease)         SUBJECTIVE     Bailey reported some heartburn overnight. Received IV protonix.     Bailey says that her heartburn, GI symptoms have improved.     OBJECTIVE     Vital signs in last 24  hours  Temp:  [97.5  F (36.4  C)-97.8  F (36.6  C)] 97.8  F (36.6  C)  Pulse:  [] 80  Resp:  [18-20] 18  BP: (139-168)/(53-89) 139/80  SpO2:  [92 %-100 %] 96 %    Weight:   [unfilled]    Review of Systems   Review of systems not obtained due to patient factors.    General Physical Exam: Patient is alert and oriented x 3. Vital signs were reviewed and are documented in EMR.  Neurological Exam:  Mental status:, Attentive.  Speech: None. Patient uses gestures to communicate.  Can nod and shake her head.  Seems to comprehend things well.  Cranial nerves: I cannot visualize the palate raise.  She has trouble taking her tongue out at me.  Otherwise 2 through 11 intact.  Motor: Some muscle atrophy.   General mild weakness throughout (Rworse>L).  Reflexes: 3+/5 at the knees.  Coordination: Normal fine motor movements of the hands.     DIAGNOSTIC STUDIES     Pertinent Radiology   Radiology Results: No new imaging to review.  Pertinent Labs   Lab Results: personally reviewed.   Recent Results (from the past 24 hour(s))   Magnesium    Collection Time: 05/10/22  6:05 AM   Result Value Ref Range    Magnesium 1.8 1.8 - 2.6 mg/dL   Phosphorus    Collection Time: 05/10/22  6:05 AM   Result Value Ref Range    Phosphorus 2.9 2.5 - 4.5 mg/dL   Basic metabolic panel    Collection Time: 05/10/22  6:05 AM   Result Value Ref Range    Sodium 133 (L) 136 - 145 mmol/L    Potassium 3.4 (L) 3.5 - 5.0 mmol/L    Chloride 101 98 - 107 mmol/L    Carbon Dioxide (CO2) 19 (L) 22 - 31 mmol/L    Anion Gap 13 5 - 18 mmol/L    Urea Nitrogen 6 (L) 8 - 22 mg/dL    Creatinine 0.62 0.60 - 1.10 mg/dL    Calcium 9.0 8.5 - 10.5 mg/dL    Glucose 128 (H) 70 - 125 mg/dL    GFR Estimate >90 >60 mL/min/1.73m2   Extra Purple Top Tube    Collection Time: 05/10/22  6:05 AM   Result Value Ref Range    Hold Specimen JIC    Potassium    Collection Time: 05/10/22 12:43 PM   Result Value Ref Range    Potassium 3.7 3.5 - 5.0 mmol/L         HOSPITAL MEDICATIONS        aspirin  81 mg Oral Daily     dextromethorphan-quiNIDine  1 capsule Oral Daily     enoxaparin ANTICOAGULANT  40 mg Subcutaneous Q24H     fluticasone  1 spray Both Nostrils Daily     pantoprazole  40 mg Oral QAM AC     riluzole  50 mg Oral Q12H     sertraline  25 mg Oral Daily        Total time spent for face to face visit, reviewing labs/imaging studies, counseling and coordination of care was: 30 Minutes. More than 50% of this time was spent on counseling and coordination of care.    Dragon software used in the dictation on this note.    Riya Staley MD  [facility] - [address], [address]  [place] [address]

## 2022-05-11 NOTE — PROGRESS NOTES
CLINICAL NUTRITION SERVICES - BRIEF NOTE      RECOMMENDATIONS FOR MD/PROVIDER TO ORDER:   Mg 1.7, consider replacing      Recommendations Ordered by Registered Dietitian (RD):   Decreased flushes to 100ml every 4 hours dt Na 132 and trending down     Future/Additional Recommendations:   Adjust TF/flushes pending hydration status/weights    If patient enrolls in hospice, consider discontinuing TF     Malnutrition:   Severe in the context of chronic illness, (please carry forward if malnutrition diagnosed)         EVALUATION OF PROGRESS TOWARD GOALS   Diet:  Clear Liquid    Nutrition Support:  Pt has GJ tube placed 5/9/22. Patient receiving Jevity 1.5 @ 45ml/hr w/ 135ml water flushes (split between ports when able to use G-tube) every 4 hours    Intake/Tolerance:      At goal, Jevity 1.5 is providing 1620kcals, 69g protein, 233g CHO, 54g fat, 23g fiber, 821ml free water, 810ml water from flushes for a total of 1631ml fluid daily.      ASSESSED NUTRITION NEEDS:  Dosing Weight:  57.2 Kg    Estimated energy needs: 5922-9545 Kcal/day (25-30 Kcal/Kg)  Justification: maintenance and repletion  Estimated protein needs: 69-86 g/day (1.2-1.5 g/Kg)  Justification: repletion  Estimated fluid needs: 3504-5351 ml/day (25-30 ml/Kg)  Justification: maintenance    NEW FINDINGS:   05/10/22 1149 55.2 kg (121 lb 11.2 oz)   05/05/22 2021 57.2 kg (126 lb)     Net fluid up 7L (15.4lbs) since admit per I/Os    Labs: Na 132, Cr 0.55, Mg 1.7    Meds: Protonix, kayciel, KCl, zofran     GI: LBM 5/9/22

## 2022-05-11 NOTE — PROVIDER NOTIFICATION
"Patient reported \"bad heartburn\" and appeared very uncomfortable. Patient asked for me to contact the doctor and request something for relief. Hospitalist was paged and stated Dr. Patel stated were going to put orders in for IV protonix X1 and then start her on a regular schedule for Prilosec      Rosita Pena RN   "

## 2022-05-11 NOTE — CONSULTS
CLINICAL NUTRITION SERVICES - BRIEF NOTE      RECOMMENDATIONS FOR MD/PROVIDER TO ORDER:   Mg 1.7, consider replacing      Recommendations Ordered by Registered Dietitian (RD):   Initiate nocturnal TF @ 60ml/hr and if tolerated increase by 10ml every 4 hours until reaching goal of 90ml/hr from 8:00pm to 8:00am w/ 120ml water flushes split between ports before and after feeding and every 3 hours between feedings.     At goal, Jevity 1.5 is providing 1620kcals, 69g protein, 233g CHO, 54g fat, 23g fiber, 821ml free water, 600ml water from flushes for a total of 1421ml fluid daily.     Future/Additional Recommendations:   Adjust TF/flushes pending hydration status/weights    If patient enrolls in hospice, consider discontinuing TF     Malnutrition:   Severe in the context of chronic illness, (please carry forward if malnutrition diagnosed)

## 2022-05-11 NOTE — PROGRESS NOTES
Requested by Kylah LILLY to follow for tube feedings. Per Kylah, patient is not interested in TCU and would like to go home with home care    Referral sent to [facility] for tube feedings. Updated Debbie Negron that referral was sent in

## 2022-05-11 NOTE — PLAN OF CARE
"  Problem: Plan of Care - These are the overarching goals to be used throughout the patient stay.    Goal: Plan of Care Review/Shift Note  Description: The Plan of Care Review/Shift note should be completed every shift.  The Outcome Evaluation is a brief statement about your assessment that the patient is improving, declining, or no change.  This information will be displayed automatically on your shift note.  Outcome: Ongoing, Progressing  Flowsheets (Taken 5/11/2022 1844)  Plan of Care Reviewed With:   patient   son  Overall Patient Progress: improving  Goal: Patient-Specific Goal (Individualized)  Description: You can add care plan individualizations to a care plan. Examples of Individualization might be:  \"Parent requests to be called daily at 9am for status\", \"I have a hard time hearing out of my right ear\", or \"Do not touch me to wake me up as it startles me\".  Outcome: Ongoing, Progressing  Goal: Absence of Hospital-Acquired Illness or Injury  Outcome: Ongoing, Progressing  Intervention: Identify and Manage Fall Risk  Recent Flowsheet Documentation  Taken 5/11/2022 1600 by Lewis Leblanc RN  Safety Promotion/Fall Prevention:   bed alarm on   clutter free environment maintained   patient and family education  Taken 5/11/2022 0800 by Lewis Leblanc RN  Safety Promotion/Fall Prevention:   bed alarm on   clutter free environment maintained   patient and family education  Intervention: Prevent Skin Injury  Recent Flowsheet Documentation  Taken 5/11/2022 1600 by Lewis Leblanc RN  Body Position: position changed independently  Taken 5/11/2022 0800 by Lewis Leblanc RN  Body Position: position changed independently  Intervention: Prevent and Manage VTE (Venous Thromboembolism) Risk  Recent Flowsheet Documentation  Taken 5/11/2022 1600 by Lewis Leblanc RN  Activity Management: activity encouraged  Taken 5/11/2022 1200 by Lewis Leblanc RN  Activity Management:   activity encouraged   ambulated to bathroom  Taken 5/11/2022 " 0800 by Lewis Leblanc, RN  Activity Management: activity encouraged  Intervention: Prevent Infection  Recent Flowsheet Documentation  Taken 5/11/2022 1600 by Lewis Leblanc, RN  Infection Prevention:   hand hygiene promoted   environmental surveillance performed   rest/sleep promoted  Goal: Optimal Comfort and Wellbeing  Outcome: Ongoing, Progressing  Goal: Readiness for Transition of Care  Outcome: Ongoing, Progressing     Problem: Muscle Strength Impairment  Goal: Improved Muscle Strength  Outcome: Ongoing, Progressing     Problem: Malnutrition  Goal: Improved Nutritional Intake  Outcome: Ongoing, Progressing     Problem: Swallowing Impairment  Goal: Optimal Eating and Swallowing Without Aspiration  Outcome: Ongoing, Progressing   Goal Outcome Evaluation:    Plan of Care Reviewed With: patient, son     Overall Patient Progress: improving  Pt alert and oriented, communicate with gesture/writing or nodding, VSS, tolerated enteral feeding well, off @1600 for noctural feed order in anticipation of discharge home, see TF orders, flushed both GJ tube and currently clamped, pt reports pain at surgical site medicated per MAR, HOB elevated for aspiration precaution, mouth care done, pt ambulated to bathroom x1 assist with walker, external cath used for urinary management while in bed, pt family hoping for discharge to home 5/12, MD aware, pt son Nghia states he is familiar with TF management and can help pt at home. Will continue to monitor.

## 2022-05-11 NOTE — PLAN OF CARE
"Goal Outcome Evaluation:    \"Bad heartburn\" was reported and patient received IV protonix. Patient was incontinent x1 and was cleaned up. Patient was repositioned for skin integrity and comfort. Tube feeding was increased to reach goal, no nausea was reported. Patient received PRN atropine. VSS.    IV infiltrated at 0645 and was removed.     Rosita Pena RN     Problem: Plan of Care - These are the overarching goals to be used throughout the patient stay.    Goal: Optimal Comfort and Wellbeing  Outcome: Ongoing, Progressing  Intervention: Monitor Pain and Promote Comfort  Recent Flowsheet Documentation  Taken 5/11/2022 0300 by Rosita Pena, RN  Pain Management Interventions: medication (see MAR)     Problem: Malnutrition  Goal: Improved Nutritional Intake  Outcome: Ongoing, Progressing     Problem: Swallowing Impairment  Goal: Optimal Eating and Swallowing Without Aspiration  Outcome: Ongoing, Progressing     "

## 2022-05-11 NOTE — PLAN OF CARE
Problem: Muscle Strength Impairment  Goal: Improved Muscle Strength  Outcome: Ongoing, Not Progressing     Problem: Plan of Care - These are the overarching goals to be used throughout the patient stay.    Goal: Optimal Comfort and Wellbeing  Intervention: Monitor Pain and Promote Comfort  Recent Flowsheet Documentation  Taken 5/10/2022 1639 by Yolanda Ramos RN  Pain Management Interventions: medication (see MAR)   Goal Outcome Evaluation:      Patient needed and received assist of 2 with repositioning, complained of abdominal discomfort, had dilaudid prn, was helpful, tube feeding rate increased to 40 at 1730, tolerating tube feeding well, was incontinent of urine, not talking, has been using thumps up and down, son called and wanted writer to give melatonin at 7pm and 3am, daughter-in-call for an update, bp 148/53, other vitals stable.

## 2022-05-12 NOTE — PROGRESS NOTES
Brief Neurology Note:    Patient is back on her riluzole and nuedexta.  Doing much better from family's standpoint.  Plan is for discharge home today.  Neurology will sign off.  Follow-up with outpatient neurologist, Dr. Perkins.      Riya Staley MD

## 2022-05-12 NOTE — PROGRESS NOTES
"Care Management Follow Up    Length of Stay (days): 6    Expected Discharge Date: 05/12/2022     Concerns to be Addressed: discharge planning     Patient plan of care discussed at interdisciplinary rounds: Yes    Anticipated Discharge Disposition: Home Care, Home Infusion     Anticipated Discharge Services: Other (see comment) (home care services; home infusion for tube feeds) Home therapy  Anticipated Discharge DME: None    Patient/family educated on Medicare website which has current facility and service quality ratings: yes  Education Provided on the Discharge Plan:  Per team  Patient/Family in Agreement with the Plan: yes    Referrals Placed by CM/SW: Homecare, Home Infusion  Private pay costs discussed: To be determined.    Additional Information:  Per previous CM notes, plan was for patient to discharge to home with family support and home infusion through [facility]. They were planning to find home PT and OT as well. However, per infusion liaison, \"It looks like change in plan. (She had) just talked with LR ([phone]) daughter in law and she said  no  to dc home. She said going home is not an option and she will need TCU.  (Liaison had) read SWs note from last night and it looks like son said patient could go home but daughter in law, Audrey, says \"that s not accurate info\". (Audrey is) coming in for 11:00 hospice meeting this morning.\" Left a message for hospice nurse liaison, RN CM to update home infusion liaison with plan.   11:49 AM:  Per hospice nurse liaison; patient prefers a home discharge with home infusion and home PT and OT. She does plan to enroll in hospice down the road. Family will provide 24/7 care for patient. Family plans to purchase a recliner for patient (patient is refusing a hospital bed) as patient MUST have her head up for the tube feeding to avoid aspiration. Update sent to home infusion liaison to determine timing of bedside teach.   12:29 PM:  Home Infusion liaison is meeting with family today " at 1400 to do the teaching for home tube feeding. She is also working on coordinating home therapy. Writer is uncertain when the purchase of the recliner for home will be completed.   2:01 PM:  Per home infusion liaison, [facility] can accept for home PT and OT for a start of care on Wednesday 5/18/22. The nurse will still be able to come to the house to assist with tube feeding concerns or questions. Update provided to patient's son at the bedside (patient was participating in therapy) and he agreed with this plan. Update provided to MD and primary bedside RN.     Imani Sapp, RN

## 2022-05-12 NOTE — PROGRESS NOTES
CLINICAL NUTRITION SERVICES - REASSESSMENT NOTE     Nutrition Prescription    RECOMMENDATIONS FOR MDs/PROVIDERS TO ORDER:  Consider Phos replacement     Malnutrition Status:    Severe malnutrition  In Context of:  Chronic illness or disease    Recommendations already ordered by Registered Dietitian (RD):  Continue TF regimen    Future/Additional Recommendations:  Monitor labs, TF tolerance, and weights     EVALUATION OF THE PROGRESS TOWARD GOALS   Diet: Clear Liquid  Nutrition Support: nocturnal TF Jevity 1.5 @ goal of 90ml/hr from 8:00pm to 8:00am w/ 120ml water flushes split between ports before and after feeding and every 3 hours between feedings.      At goal, Jevity 1.5 is providing 1620kcals, 69g protein, 233g CHO, 54g fat, 23g fiber, 821ml free water, 600ml water from flushes for a total of 1421ml fluid daily     NEW FINDINGS   Tolerating TF well per nursing note  Labs: Na 132(L), creatinine 0.55(L), P 2.4(L)  Meds: protonix  Weight trends:  05/10/22 1149 55.2 kg (121 lb 11.2 oz)   05/05/22 2021 57.2 kg (126 lb)     Malnutrition Diagnosis: Severe malnutrition  In Context of:  Chronic illness or disease    CURRENT NUTRITION DIAGNOSIS  Malnutrition related to chronic illness as evidenced by 13.7% weight loss in 5 months, inadequate oral intake > 1 month and moderate subcutaneous fat loss and moderate to severe muscle loss      INTERVENTIONS  Implementation  Continue TF regimen    Goals  Tolerate TF -met  Labs WNL - progressing    Monitoring/Evaluation  Progress toward goals will be monitored and evaluated per protocol.    Heather Blank  Dietetic Intern

## 2022-05-12 NOTE — DISCHARGE SUMMARY
[facility]  Hospitalist Discharge Summary      Date of Admission:  5/5/2022  Date of Discharge:  5/12/2022  Discharging Provider: Genna Medina MD  Discharge Service: Hospitalist Service    Discharge Diagnoses       ALS/pseudobulbar affect/dysphagia    Severe malnutrition    GERD    Hyponatremia    Hypokalemia, hypomagnesemia, hypophosphatemia    Follow-ups Needed After Discharge   Follow-up Appointments     Follow-up and recommended labs and tests       Follow up with primary care provider, Daniel Fortune, within 7 days for   hospital follow- up.   Follow up with neurology, Dr. Schaffer in 1-2 months               Discharge Disposition   Admited to home care:    Discharged to home  Condition at discharge: Stable      Hospital Course            67 year old female with a known history of worsening ALS presenting with poor oral intake and generalized body weakness of 2 days duration.  She had developed significant dysphagia related to ALS.  Initially resistant to feeding tube but then agreeable with feeding tube placed.  Did discuss goals of care.  Had conversation with hospice.  Patient was tolerating tube feeds, transition to nocturnal tube feeds, with TCU recommended by therapy but family wanting to bring patient home and open to home therapy.  Did not sign onto hospice but open in the future.  Discharged home with family and home care on tube feeds in good condition.  Detailed hospital course per below     Dysphagia, failure to thrive  ---difficulty swallowing solid food for a while until 2 days prior to admission   ---patient has a known history of ALS for the past 2 years with progressive bulbar palsy.   --Initially resistant to a feeding tube.  Now status post feeding tube placement with GJ  - --Suspect much weakness from malnutrition  - SLP, MBS done with Aspiration with mildly thickened liquid.  - S/P GJ tube placement on 05/09 by EROS Orantes RD following, trial of nighttime tube feeds successful  and will discharge with nighttime tube feeds    ALS/pseudobulbar affect  -Diagnosed with ALS around a year ago as per her son  -Has history of progressive bulbar palsy with aphasia and dysphagia   -Patient has been taking riluzole, nuedexta since June 2021. continue  - Resume meds through g tube  -Patient is unable to speak due to bulbar palsy.  Uses signs and iPad for communication  -Neurology followed while here  -TCU recommended, family declines  --had informational hospice meeting   --contineu zoloft    GERD - resumed home prilosec    Severe malnutrition  -In the context of chronic illness  -Management as per RD now on tube feeds  --trial night time feeds          Consultations This Hospital Stay   PALLIATIVE CARE ADULT IP CONSULT  SPEECH LANGUAGE PATH ADULT IP CONSULT  NEUROLOGY IP CONSULT  CARE MANAGEMENT / SOCIAL WORK IP CONSULT  NUTRITION SERVICES ADULT IP CONSULT  PHARMACY IP CONSULT  PHYSICAL THERAPY ADULT IP CONSULT  OCCUPATIONAL THERAPY ADULT IP CONSULT  INPATIENT HOSPICE ADULT CONSULT  NUTRITION SERVICES ADULT IP CONSULT  NUTRITION SERVICES ADULT IP CONSULT    Code Status   No CPR- Do NOT Intubate    Time Spent on this Encounter   I, Genna Medina MD, personally saw the patient today and spent greater than 30 minutes discharging this patient.       Genna Medina MD  [place] [id number]  Phone: [phone]  Fax: [phone]  ______________________________________________________________________    Physical Exam   Vital Signs: Temp: 97.9  F (36.6  C) Temp src: Axillary BP: 136/73 Pulse: 78   Resp: 18 SpO2: 95 % O2 Device: None (Room air)    Weight: 121 lbs 11.2 oz  General Appearance: Nonverbal.  Today she communicates by pointing thumbs up or thumbs down with son in the room.  Denies pain to me.  Respiratory: Relatively clear anteriorly  Cardiovascular: Regular rate and rhythm  GI: G-tube in place without erythema or pain  Skin: No significant  edema or open areas or rashes  Other: Neurologically she is diffusely weak.  Nonverbal.       Primary Care Physician   Daniel Fortune    Discharge Orders      IR Gastro Jejunostomy Tube Change     Home Care Referral      Brief Discharge Instructions    Gastrojejunostomy (GJ) Tube Discharge Instructions:   You had a gastrojejunostomy (stomach-intestinal) also known as GJ tube placed on 5/9/2022  . This tube is often used for nutritional support, medication administration and/or stomach venting.     Care instructions:  - If you received sedation for your procedure, do not drive or operate heavy machinery for the rest of the day.  - Avoid soaking in stagnant water (tub baths, Jacuzzis, pools, lake, or ocean).   - You may shower beginning the day after the tube was placed.   - Clean under the disc daily with soap and water. Pat dry under disc and apply new split gauze dressing under disc. Cleaning tube site daily will help prevent infection and skin irritation.  - A small amount of clear tan drainage from the tube exit site can be normal.  - Make sure the disc on the tube fits slightly snug against the skin so that the tube does not move in or out of the body easily.  - Flush your tube with 60cc of water twice a day (using a cath tip syringe) to prevent tube from clogging (or follow recommendations from your dietician if given).    Giving Feedings and Medications:  - Follow-up with your dietician, primary care provider, or oncologist for instructions on tube feedings and medication administration.    - ONLY use specific enteric feedings with your GJ tube (unless otherwise discussed with your dietitian).    - Flush tube at least twice daily with 60ml of water using cath tip syringe or follow dietician's instructions if given.  - Flush the tube before and after administrating medications and bolus feedings with 60cc of water.  - Note: Most jejunal (J port) feedings are given by a continuous method. A continuous feeding  is given with a pump over a period of time, usually 12 to 24 hours based upon your specific situation.    Follow Up:  - Routine 3 month exchanges of feeding tube is recommended. Please contact [facility] at [phone] to arrange a GJ exchange appointment.  - GJ tubes CANNOT be removed until 6 weeks after date of tube placement (Tube placed 5/9/2022).  - T-fasteners/Buttons (suture) should be removed 7-10 days after tube placement. This may have been done while you were still and inpatient, or can be completed in your outpatient clinic or care facility. Please contact [facility] for T fastener questions or concerns at [phone].    Please seek medical evaluation for:  - Fever (greater than 101 F (38.3C)).  - Purulent (yellow/green/foul smelling) drainage from tube exit site.   - Significant or worsening abdominal pain.   - Skin that is hot to the touch or significantly reddened at the tube exit site.    - Bleeding at tube exit site.    Call [facility] at [phone] with questions or if you have any of the following symptoms:  - Tube falls out or felt to be out of position.  - Unable to flush tube.  - Significant leakage around tube site (tube feeding, medications or drainage).  - Significant bleeding at the tube exit site.  - Severe pain at tube exit site.     Brief Discharge Instructions    T Fastener Removal Recommendations:  Patient's gastropexy sutures (T fastener buttons) should be removed from around patient's percutaneous feeding tube exit site approximately 7-10 days from the procedure date. Recommend removal on 5/16/22. These can be snipped bypulling up on the T fastener and clipping the suture between the T fastener button and the patient's skin. Contact [facility] with questions or concerns regarding T fastener at [phone].     Reason for your hospital stay    Malnutrition due to ALS     Follow-up and recommended labs and tests     Follow up with primary care  provider, Daniel Fortune, within 7 days for hospital follow- up.   Follow up with neurology, Dr. Schaffer in 1-2 months     Activity    Your activity upon discharge: activity as tolerated     Diet    Follow this diet upon discharge: Orders Placed This Encounter      Adult Formula Drip Feeding: Continuous Jevity 1.5; Jejunostomy; Goal Rate: 90; mL/hr; Medication - Feeding Tube Flush Frequency: At least 15-30 mL water before and after medication administration and with tube clogging; Yes; 60; 10; hr; 4; Advance...      Advance Diet as Tolerated: Clear Liquid Diet     Diet    TUBE FEEDING     Route: G-J     Formula: Jevity 1.5    Cyclic Tube Feeding  Start Time: 8pm    Stop Time: 8am  Rate (mL/hr): 90 ml/hr    Flush: 120 mL of water   Flush Instructions: before and after feeding and every 3 hours between feeding, split between ports     Diet    Sit up or elevate head of bed >30 degrees during feeding.    ORAL DIET: clear liquid    RD Contact: [phone]        Pump justification for homecare: jejunostomy tube    Per MD:   MD recommendations: Anticipate patient will require TF 90+ days       Significant Results and Procedures   Most Recent 3 CBC's:Recent Labs   Lab Test 05/11/22  0703 05/08/22  0546 05/05/22  2236   WBC 4.8 5.5 5.0   HGB 12.0 12.7 13.2   MCV 95 90 92    232 254     Most Recent 3 BMP's:Recent Labs   Lab Test 05/12/22  0633 05/11/22  0703 05/10/22  1243 05/10/22  0605 05/09/22  1704 05/09/22  1555 05/09/22  0559   NA  --  132*  --  133*  --   --  137   POTASSIUM 4.0 3.5 3.7 3.4*  --    < > 3.0*   CHLORIDE  --  98  --  101  --   --  102   CO2  --  27  --  19*  --   --  24   BUN  --  8  --  6*  --   --  4*   CR  --  0.55*  --  0.62  --   --  0.55*   ANIONGAP  --  7  --  13  --   --  11   KEON  --  8.7  --  9.0  --   --  8.8   GLC  --  112  --  128* 84  --  87    < > = values in this interval not displayed.   ,   Results for orders placed or performed during the hospital encounter of 05/05/22   XR Video  Swallow with SLP or OT    Narrative    EXAM: XR VIDEO SWALLOW WITH SLP OR OT  LOCATION: [facility]  DATE/TIME: 5/7/2022 1:45 PM    INDICATION: Difficulty swallowing.  COMPARISON: None.    TECHNIQUE: Routine swallow study with speech pathology using multiple barium thicknesses.    FINDINGS:   FLUOROSCOPIC TIME: .5 minutes  NUMBER OF IMAGES: 2    Swallow study with Speech Pathology using multiple barium thicknesses. Aspiration with mildly thickened liquid. Moderately thick liquid was subsequently given, with aspiration again seen, though it was difficult to tell if the aspiration was from   residuals versus aspiration of the newly given moderate consistency. No other barium given.         IR Gastro Jejunostomy Tube Placement    Narrative    [facility]  LOCATION: [facility]  DATE: 5/9/2022    PROCEDURE: PERCUTANEOUS GASTROJEJUNOSTOMY PLACEMENT    INTERVENTIONAL RADIOLOGIST: Lobo Wills MD.    INDICATION: 67-year-old female with feeding difficulties.    MODERATE SEDATION: Versed 1 mg IV; Fentanyl 50 mcg IV.  Under physician supervision, Versed and fentanyl were administered for moderate sedation. Pulse oximetry, heart rate and blood pressure were continuously monitored by an independent trained   observer. The physician spent 30 minutes of face-to-face sedation time with the patient.    CONTRAST: 30 mL Omnipaque enteric.  ANTIBIOTICS: Ancef 2 grams intravenous.  ADDITIONAL MEDICATIONS: None.    FLUOROSCOPIC TIME: 3.1 minutes.  RADIATION DOSE: Air Kerma: 20 mGy.    COMPLICATIONS: No immediate complications.    STERILE BARRIER TECHNIQUE: Maximum sterile barrier technique was used. Cutaneous antisepsis was performed at the operative site with application of 2% chlorhexidine and large sterile drape. Prior to the procedure, the  and assistant performed   hand hygiene and wore hat, mask, sterile gown, and sterile gloves during the entire  procedure.    PROCEDURE:    Ultrasound examination of the epigastric region was performed to localize the left hepatic edge, which was marked on the patient's skin.     The stomach was insufflated with air. Under direct fluoroscopic visualization, Two point gastropexy was performed with endoluminal positioning confirmed with a small amount of contrast. Using a third puncture between the gastropexy sites, a guidewire was   placed into the stomach.     A catheter and wire were manipulated through the gastric pylorus and duodenum and positioned in the proximal jejunum.    Following tract dilatation, over wire exchange was made for a new 18F 45 cm JAYESH gastrojejunostomy which was positioned under fluoroscopic guidance with its distal tip in the proximal jejunum. The retention balloon was inflated with 10 mL of saline. A   post placement contrast injection of both the gastric and jejunal lumens was performed.    FINDINGS:  Ultrasound demonstrates a normal-appearing liver, the lower margin of which was marked on the patient's skin.    After placement the new gastrojejunostomy is in appropriate position with the gastric lumen emptying into the stomach and jejunal lumen emptying near the ligament of Treitz.      Impression    IMPRESSION:    Successful percutaneous gastrojejunostomy placement as detailed above.      ____________________________________________________________________    CPT codes for physician reference only:  55180  85960  25644  11846             Discharge Medications   Current Discharge Medication List      START taking these medications    Details   pantoprazole (PROTONIX) 2 mg/mL SUSP suspension 20 mLs (40 mg) by Oral or Feeding Tube route every morning (before breakfast)  Qty: 600 mL, Refills: 0    Associated Diagnoses: ALS (amyotrophic lateral sclerosis) (H)         CONTINUE these medications which have CHANGED    Details   aspirin (ASA) 81 MG chewable tablet 1 tablet (81 mg) by Oral or Feeding Tube  route daily    Associated Diagnoses: ALS (amyotrophic lateral sclerosis) (H)      NUEDEXTA 20-10 MG capsule 1 capsule by Oral or Feeding Tube route daily    Associated Diagnoses: ALS (amyotrophic lateral sclerosis) (H)      riluzole (RILUTEK) 50 MG tablet 1 tablet (50 mg) by Oral or Feeding Tube route every 12 hours  Qty: 60 tablet, Refills: 2    Associated Diagnoses: ALS (amyotrophic lateral sclerosis) (H)      sertraline (ZOLOFT) 50 MG tablet 1.5 tablets (75 mg) by Oral or Feeding Tube route daily TAKE ONE AND ONE-HALF TABLETS BY MOUTH DAILY  Qty: 45 tablet, Refills: 2    Associated Diagnoses: PBA (pseudobulbar affect)         CONTINUE these medications which have NOT CHANGED    Details   atropine 1 % ophthalmic solution Take 1-2 drops by mouth, place under tongue or place inside cheek 2 times daily as needed (excess saliva)  Qty: 5 mL, Refills: 1    Associated Diagnoses: Sialorrhea         STOP taking these medications       calcium carbonate 600 mg-vitamin D 400 units (CALTRATE) 600-400 MG-UNIT per tablet Comments:   Reason for Stopping:         multivitamin, therapeutic with minerals (THERA-VIT-M) TABS tablet Comments:   Reason for Stopping:             Allergies   No Known Allergies

## 2022-05-12 NOTE — PLAN OF CARE
Goal Outcome Evaluation:    Plan of Care Reviewed With: patient     Overall Patient Progress: improving    Outcome Evaluation: discharge this evening to home with home care    Hospice consult today with patient and family. Plan is to not sign onto hospice yet and to go home with home care. Family is going to set up care as well at home. Pt very eager to go home. Pt got a dose of IV phos for phos level of 2.4 per protocol. Using purewick, incontinent at times as well. Denies any pain. Tube feeding stopped this morning per orders. Flushed tube feeding as well per orders. Infusion nurse here doing teaching with how to do tube feeding at home.     Cheryl Vasquez RN 5/12/2022 3:43 PM

## 2022-05-12 NOTE — PROGRESS NOTES
Therapy: Enteral TF  Insurance:[employer] Medicare  No Ded    Co-Insurance: 80/20  Max Out of Pocket: $3800.00  Met: $169.29  Once OOP is met coverage will be 100%    Anticipated length of need must be 90 days or more in order for insurance to cover. Please document this in the discharge summary.     Please contact Intake with any questions, [phone] or In Basket pool,  Home Infusion ([phone]).

## 2022-05-12 NOTE — PROGRESS NOTES
Care Management Discharge Note    Discharge Date: 05/12/2022    Discharge Disposition: Home Care, Home Infusion    Discharge Services: None    Discharge DME: None (Tube feeding supplies; other equipement Per therapy)    Discharge Transportation:  (Family)    Private pay costs discussed: Not applicable    PAS Confirmation Code:  (NA)  Patient/family educated on Medicare website which has current facility and service quality ratings: yes    Education Provided on the Discharge Plan:    Persons Notified of Discharge Plans: Nursing; Home Care; patient's family;    Patient/Family in Agreement with the Plan: yes    Handoff Referral Completed: Yes    Additional Information:  Discharge to home with family support. [facility] and [facility] for home therapy.   1:20 PM 5/13/2022: received a message from patient's daughter, Audrey, stating patient having pain at g-tube site and wondering about pain meds. Note MD who discharged patient is off today. Text page sent to home infusion liaison but will advise patient and family to call PCP.     Imani Sapp RN

## 2022-05-12 NOTE — DISCHARGE INSTRUCTIONS
HOME INFUSION:  A referral has been made for you to [facility] for home infusion services.  They should call you to arrange first visit but don't hesitate to call them if you have any questions or concerns.  Their number is [phone].    A referral has been made for you to [facility] for home therapy.  They should call you to arrange the first visit but, if you have questions or concerns, you can call them at [phone], choose option #1 for Home Health, then choose option #1 for scheduling. Thank you.

## 2022-05-12 NOTE — CONSULTS
HOSPICE - writer spoke with pt, pt son, Rory and YVETTE Ventura. Writer explained hospice benefits and philosophy. Writer described three locations hospice takes place. Pt goal is to go home. Pt would like some PT/OT but does not want TCU. Writer and pt/family talked about home care v hospice.   Pt and family chose to discharge to home with home care and continue TF.   Writer gave YVETTE Ventura contact information for if/when pt is ready for hospice.   Writer updated care manager Imani RODRIGUEZ RN.   Thank you  Frances Simmons  [facility]  [phone]

## 2022-05-12 NOTE — PROGRESS NOTES
Bialey did well throughout the night. PRN melatonin given at bedtime per patient request. No complaints of pain. Tube feeding started at 2200 at 60ml/hr. Rate was increased by 10ml every four hours and will eventually reach a goal rate of 90ml/hr. Tube feeding tolerated well. Purewick in place.     Plan for hopeful discharge home today.

## 2022-05-12 NOTE — PLAN OF CARE
Physical Therapy Discharge Summary    Reason for therapy discharge:    Discharged to home with home therapy.    Progress towards therapy goal(s). See goals on Care Plan in [facility] electronic health record for goal details.  Goals partially met.  Barriers to achieving goals:   discharge from facility.    Therapy recommendation(s):    Continued therapy is recommended.  Rationale/Recommendations:  to have assist for mobility and safety at home..

## 2022-05-12 NOTE — PROGRESS NOTES
Care Management Follow Up    Length of Stay (days): 5    Expected Discharge Date: 05/12/2022     Concerns to be Addressed: discharge planning       Patient plan of care discussed at interdisciplinary rounds: Yes    Anticipated Discharge Disposition: Home Care, Home Infusion     Anticipated Discharge Services: Other (see comment) (home care services; home infusion for tube feeds)    Anticipated Discharge DME: None    Patient/family educated on Medicare website which has current facility and service quality ratings: yes    Education Provided on the Discharge Plan:  Yes    Patient/Family in Agreement with the Plan: yes    Referrals Placed by CM/SW: Homecare, Home Infusion    Private pay costs discussed: Not applicable    Additional Information: Per Dr Medina, pt has had feeding tube for two days now.  Pt is now at goal per dietician.  Family talking about taking pt home on hospice.  Can pt be on feeding tube and have hospice services?  SW to follow up with hospice.  SW spoke with Frances from [facility].  She spoke with pt's daughter-in-law Audrey and set up hospice meeting for 11 AM tomorrow.      VIJAYA informed by pt's nurse that pt's son is in pt's room and would like to speak with SW. VIJAYA met with pt and pt's son Nghia to discuss discharge planning.  Noted that pt's adult nonverbal disabled son also present in the room.  Pt and Nghia reported that pt is doing much better today.  They feel that the tube feedings have improved pt's strength.  Pt able to write much better (they showed SW samples of pt's writing from when she first came and from today).  Pt does not need assistance with steadying her hand any longer.  Pt okay with home PT and home OT services.  Pt and Nghia do not feel that pt needs hospice services now.  They are okay with still having meeting tomorrow and Audrey may just cover it.  Pt would like to go home today if possible.  VIJAYA updated Dr Medina.  Plan to discharge home tomorrow with home care  service and home infusion for tube feeds.  Pt to trial on nighttime feeds and see how she does.  SW spoke with RN IGNACIO Arreola, who will work on referral for tube feeds, equipment, etc through home infusion services.  Plan for family transport and ride time around noon.        NEW Rodríguez, [facility] 05/11/22 7:32 PM

## 2022-05-12 NOTE — PROGRESS NOTES
[facility]    Referral received  from Imani Arreola CM, for enteral therapy.    Benefits verified. Patient meets Medicare criteria for enteral tube feedings.  Her [facility] Medicare plan will over at 80/20 until her out of pocket max of $3800 is met.  She has met $169.29 so far.  Anticipated length of need must be 90 days or more and documented in her chart in order for plan to cover.    Writer will speak with pt/family to review benefits, home infusion and to offer choice of agency/home infusion provider. Writer will offer a teach today prior to discharge.  [facility] is looking for a home care agency currently for SN, PT and OT.    Thank you for the referral.    Debbie Negron RN, BSN  [facility] Liaison  [phone] (Mon through Fri, 8:00 am-5:00 pm)  [phone] (Office)    ADDENDUM:  Writer met with pt, son and dtr in law for teaching of home enteral tube feedings.  Son and dtr in law were shown operation of pump, filling of feeding bags and attachments to pump.  Family is able to demonstrate a mock TF.  Family shown how to flush GJ tube and will be administering meds and feeds via [employer].  [facility] will be the home care seeing this patient beginning 5/18/22.  She will receive home SN, PT and OT.  Family declined another teach visit tomorrow for tube feeding/med admin education, stating they feel comfortable with it.  Formula and supplies will be delivered to patient's home tonight by 9 pm.    Family had no further questions or concerns.  They have [facility] number to call with any questions. Updated Imani Sapp CM.

## 2022-05-13 NOTE — TELEPHONE ENCOUNTER
Audrey, daughter-in-law, states patient got a port put in for feeding tube. Discharged from hospital yesterday. Sight is sore and hurts. Wants meds to help with the pain. Port located in stomach, G and J tubes placed.  Pharmacy:  [facility] in [place] on [address]. Please call Audrey at:   [phone].  Thank you,  Gretta Palacios RN  [facility]    Reason for Disposition    Caller requesting a NON-URGENT new prescription or refill and triager unable to refill per department policy    Additional Information    Negative: Drug overdose and triager unable to answer question    Negative: Caller requesting information unrelated to medicine    Negative: Caller requesting a prescription for Strep throat and has a positive culture result    Negative: Rash while taking a medication or within 3 days of stopping it    Negative: Immunization reaction suspected    Negative: Asthma and having symptoms of asthma (cough, wheezing, etc.)    Negative: Breastfeeding questions about mother's medicines and diet    Negative: MORE THAN A DOUBLE DOSE of a prescription or over-the-counter (OTC) drug    Negative: DOUBLE DOSE (an extra dose or lesser amount) of over-the-counter (OTC) drug and any symptoms (e.g., dizziness, nausea, pain, sleepiness)    Negative: DOUBLE DOSE (an extra dose or lesser amount) of prescription drug and any symptoms (e.g., dizziness, nausea, pain, sleepiness)    Negative: Took another person's prescription drug    Negative: DOUBLE DOSE (an extra dose or lesser amount) of prescription drug and NO symptoms (Exception: a double dose of antibiotics)    Negative: Diabetes drug error or overdose (e.g., took wrong type of insulin or took extra dose)    Negative: Caller has medication question about med not prescribed by PCP and triager unable to answer question (e.g., compatibility with other med, storage)    Negative: Request for URGENT new prescription or refill of 'essential' medication (i.e., likelihood of harm  to patient if not taken) and triager unable to fill per department policy    Negative: Prescription not at pharmacy and was prescribed today by PCP    Negative: Pharmacy calling with prescription questions and triager unable to answer question    Negative: Caller has urgent medication question about med that PCP prescribed and triager unable to answer question    Negative: Caller has NON-URGENT medication question about med that PCP prescribed and triager unable to answer question    Protocols used: MEDICATION QUESTION CALL-A-[place]

## 2022-05-19 NOTE — PROGRESS NOTES
Social Work -ALS Clinic Progress Note  [facility]    Data/Intervention:    Patient Name:  Bailey Malhotra  /Age:  1955 (67 year old)    Visit Type: telephone    Collaborated With:    -Audrey Malhotra, Pt's dtr in law  -Dr Perkins and Lupe Rodríguez LPN    Psychosocial info/Concerns:   Pt is becoming difficult for her family to manage at home as she needs someone with her at all times. They are taking turns but it's been difficult due to their other obligations. Her son Nghia hasn't moved back in yet but is staying there when he can. The demands on Audrey are high because Bailey wants her to to be the person to help her with personal hygiene care. (she wears attends)  Audrey feels they need to find placement for Bailey but she isn't going to want to go.   She wonders about coverage for a facility.     Intervention/Education/Resources Provided:  Discussed that her needs are half-way so she would need to pay for her care in a facility. Recommended that they initiate hospice now who can help them talk with Bailey about placement and help with care planning. Bailey is not eligible for [facility] funding for care at this time.   She has a home care RN first visit  and could request that [facility] change her to hospice. We have already had the discussion about hospice and Pt was in agreement. Audrey will be there for the home RN visit.     Assessment/Plan:  Will fax orders for hospice to [facility]. F/u with Audrey indicates that they did visit today and will plan to enroll her in hospice.     Provided patient/family with contact information and encouraged them to contact me between clinic visits as needed.     Lian Wolfe, MSW, [facility]    ealth  [facility]  [phone]

## 2022-05-23 NOTE — TELEPHONE ENCOUNTER
Health Call Center    Phone Message    May a detailed message be left on voicemail: yes     Reason for Call: Other: Stepan at [facility] called to advise they will be seeing Pt today at 1pm.      She is asking for Pt's prognosis and if they do admit her into Hospice, if Dr. Perkins is willing to be her attending physician.    Please call Stepan back at [phone] to advise.    Action Taken: Message routed to:  [facility] ([facility]): Neurology    Travel Screening: Not Applicable

## 2022-06-07 NOTE — TELEPHONE ENCOUNTER
[facility]    Phone Message    May a detailed message be left on voicemail: yes     Reason for Call: Other: Pt daughter-in-law Audrey called and stated that she needs copies of the Pt visit notes. Please call back with further information.      Action Taken: Message routed to:  [facility] ([facility]): [facility] Neurology    Travel Screening: Not Applicable

## 2022-06-30 NOTE — TELEPHONE ENCOUNTER
Patient fell and hit her head on the ground   Has a lump on the head  Pupils are dilated  No speech due to ALS  Right side of head toward the top quarter size of an egg and ice to area was bigger than than that  Swelling is an 1/2 inch in size  Patient is on baby aspirin  Patient was not knock out and no seizure activity noted  Dr. Mira Manzo returned call and advises for patient to go to ED for evaluation  Triager called out to patients son and updated regarding providers advise  Caller verbalized and understands directives  COVID 19 Nurse Triage Plan/Patient Instructions    Please be aware that novel coronavirus (COVID-19) may be circulating in the community. If you develop symptoms such as fever, cough, or SOB or if you have concerns about the presence of another infection including coronavirus (COVID-19), please contact your health care provider or visit [email or web address].     Disposition/Instructions    ED Visit recommended. Follow protocol based instructions.     Bring Your Own Device:  Please also bring your smart device(s) (smart phones, tablets, laptops) and their charging cables for your personal use and to communicate with your care team during your visit.    Thank you for taking steps to prevent the spread of this virus.  o Limit your contact with others.  o Wear a simple mask to cover your cough.  o Wash your hands well and often.    Resources    [facility]: About COVID-19: [email or web address]    CDC: What to Do If You're Sick: www.cdc.gov/coronavirus/2019-ncov/about/steps-when-sick.html    CDC: Ending Home Isolation: www.cdc.gov/coronavirus/2019-ncov/hcp/disposition-in-home-patients.html     CDC: Caring for Someone: www.cdc.gov/coronavirus/2019-ncov/if-you-are-sick/care-for-someone.html     [facility]: Interim Guidance for Hospital Discharge to Home: www.health.[facility].mn.us/diseases/coronavirus/hcp/hospdischarge.pdf    [facility] clinical trials (COVID-19  "research studies): [email or web address]-clinical-trials     Below are the [employer] hotlines at the [place] Department of [facility] ([facility]). Interpreters are available.   o For health questions: Call 737-842-8129 or 1-989.828.3071 (7 a.m. to 7 p.m.)  o For questions about schools and childcare: Call 927-987-7208 or 1-998.425.5329 (7 a.m. to 7 p.m.)                         Reason for Disposition    [1] Age over 65 years AND [2] swelling or bruise    Additional Information    Negative: [1] ACUTE NEURO SYMPTOM AND [2] present now  (DEFINITION: difficult to awaken OR confused thinking and talking OR slurred speech OR weakness of arms OR unsteady walking)    Negative: Knocked out (unconscious) > 1 minute    Negative: Seizure (convulsion) occurred  (Exception: prior history of seizures and now alert and without Acute Neuro Symptoms)    Negative: Penetrating head injury (e.g., knife, gun shot wound, metal object)    Negative: [1] Major bleeding (e.g., actively dripping or spurting) AND [2] can't be stopped    Negative: [1] Dangerous mechanism of injury (e.g., MVA, diving, trampoline, contact sports, fall > 10 feet or 3 meters) AND [2] NECK pain AND [3] began < 1 hour after injury    Negative: Sounds like a life-threatening emergency to the triager    Negative: [1] Diagnosed with concussion AND [2] within last 14 days    Negative: [1] Traumatic brain injury (mTBI; concussion) AND [2] more than 14 days since head injury    Negative: Can't remember what happened (amnesia)    Negative: Vomiting once or more    Negative: [1] Loss of vision or double vision AND [2] present now    Negative: Watery or blood-tinged fluid dripping from the NOSE or EARS now  (Exception: tears from crying or nosebleed from nasal trauma)    Negative: [1] One or two \"black eyes\" (bruising, purple color of eyelids) AND [2] onset within 24 hours of head injury    Negative: Large swelling or bruise > 2 inches (5 cm)    Negative: Skin is split open or " gaping  (or length > 1/2 inch or 12 mm)    Negative: [1] Bleeding AND [2] won't stop after 10 minutes of direct pressure (using correct technique)    Negative: Sounds like a serious injury to the triager    Negative: [1] ACUTE NEURO SYMPTOM AND [2] now fine  (DEFINITION: difficult to awaken OR confused thinking and talking OR slurred speech OR weakness of arms OR unsteady walking)    Negative: [1] Knocked out (unconscious) < 1 minute AND [2] now fine    Negative: [1] SEVERE headache AND [2] not improved 2 hours after pain medicine/ice packs    Negative: Dangerous injury (e.g., MVA, diving, trampoline, contact sports, fall > 10 feet or 3 meters) or severe blow from hard object (e.g., golf club or baseball bat)    Negative: Taking Coumadin (warfarin) or other strong blood thinner, or known bleeding disorder (e.g., thrombocytopenia)    Negative: Suspicious history for the injury    Protocols used: HEAD INJURY-A-AH

## 2022-07-27 NOTE — ED TRIAGE NOTES
Patient is here with her son for evaluation and repair of her GJ tube which has become clogged. Her son reports that they have tried a few remedies to relieve the clog without success. Patient does not speak due to advanced ALS.

## 2022-07-27 NOTE — TELEPHONE ENCOUNTER
Son Nghia is currently PCA for his mom Bialey.  Nghia is calling today and states that her feeding  tube is clogged.  Nghia has tried clog zapper and that did not work.  Son states that he will try sprite something bubbly and fizzing.  FNA advised that if cannot unclog tube patient will need to go to ER and Nghia agrees.

## 2022-07-27 NOTE — ED NOTES
Patient and family member reviewed discharge.  Discussed printed discharge information.  Follow-up appts reviewed.   What s/s warrant return to the ER.    Importance of social distancing, good hand hygiene, and wearing a mask when in public spaces.  Assessment deferred to provider

## 2022-07-27 NOTE — ED NOTES
ED Provider In Triage Note  [facility]  Encounter Date: Jul 27, 2022    Chief Complaint   Patient presents with     Gtube Problem     ED Provider In Triage Note  [facility]  Encounter Date: Jul 27, 2022    Chief Complaint   Patient presents with     Gtube Problem       Brief HPI:   Bailey Malhotra is a 67 year old female presenting to the Emergency Department with a chief complaint of clogged GJ tube. Does not take any PO. Tried clog zapper at home without success.    Brief Physical Exam:  /72   Pulse 64   Temp 98.4  F (36.9  C) (Oral)   Resp 18   Wt 54.9 kg (121 lb)   LMP  (LMP Unknown)   SpO2 98%   BMI 20.77 kg/m    General: Non-toxic appearing, thin, frail, wheelchair bound  HEENT: Atraumatic  Resp: No respiratory distress  Abdomen: Non-peritoneal, GJ in place  Neuro: Alert, nonverbal  Psych: Behavior appropriate      Plan Initiated in Triage:  Will need to attempt flushing and likely replacement.      PIT Dispo:   Return to [facility] while awaiting workup and ED bed availability    Xiomara Jaffe MD on 7/27/2022 at 1:57 PM    Patient was evaluated by the Physician in Triage due to a limitation of available rooms in the Emergency Department. A plan of care was discussed based on the information obtained on the initial evaluation and patient was consuled to return back to the Emergency Department lobby after this initial evalutaiton until results were obtained or a room became available in the Emergency Department. Patient was counseled not to leave prior to receiving the results of their workup.     Xiomara Jaffe MD  [facility] [place] EMERGENCY DEPARTMENT  [address] [place] [id number]  [phone]         Xiomara Jaffe MD  07/27/22 0027

## 2022-07-27 NOTE — ED NOTES
Son asked if we could flush tube as clot buster was instilled last night and this afternoon. Was able to irrigate the tube with sterile water  And tube flushed freely after. MD notified.

## 2022-07-27 NOTE — DISCHARGE INSTRUCTIONS
You were seen in the emergency department at [facility] for a G-tube malfunction which was able to be resolved with some clot dissolving medication.  Please make sure you are flushing the G-tube to keep it patent.  If you have other concerns about it not working again we can reevaluate you or you can call your clinic as needed.

## 2022-07-28 NOTE — ED PROVIDER NOTES
EMERGENCY DEPARTMENT ENCOUNTER      NAME: Bailey Malhotra  AGE: 67 year old female  YOB: 1955  MRN: [id number]  EVALUATION DATE & TIME: 2022  3:31 PM    PCP: Daniel Fortune    ED PROVIDER: Delroy Betancur M.D.      Chief Complaint   Patient presents with     Gtube Problem         FINAL IMPRESSION:  1. Gastrostomy tube dysfunction (H)          ED COURSE & MEDICAL DECISION MAKIN year old female presents to the Emergency Department for evaluation of G-tube problem.  Patient is vitally stable and well-appearing when she arrives to the emergency department.  Abdominal exam is benign.  Nurse was able to troubleshoot the G-tube in triage and eventually able to flush it and now appears to be working normally.  No indication for further work-up based on exam.  Patient and family were anxious to discharge.  G-tube cares were discussed.  Patient discharged in good condition.    At the conclusion of the encounter I discussed the results of all of the tests and the disposition. The questions were answered. The patient or family acknowledged understanding and was agreeable with the care plan.     MEDICATIONS GIVEN IN THE EMERGENCY:  Medications   0.9% sodium chloride BOLUS (0 mLs Intravenous Stopped 22 1524)       NEW PRESCRIPTIONS STARTED AT TODAY'S ER VISIT  Discharge Medication List as of 2022  3:24 PM             =================================================================    HPI    Patient information was obtained from: Patient, Family      Bailey Malhotra is a 67 year old female who presents to this ED today for evaluation of G-tube problem.  Patient reportedly unhooked herself from feeds last night and the tube became clotted.  Family was unable to flush it at home.  This persisted through the day today.  Patient does not have any other complaints and specifically denies any vomiting or abdominal pain.      REVIEW OF SYSTEMS   All systems reviewed and negative except as noted  in HPI.    PAST MEDICAL HISTORY:  Past Medical History:   Diagnosis Date     Cholelithiasis      GERD (gastroesophageal reflux disease)        PAST SURGICAL HISTORY:  Past Surgical History:   Procedure Laterality Date     IR GASTRO JEJUNOSTOMY TUBE PLACEMENT  5/9/2022     TUBAL LIGATION             CURRENT MEDICATIONS:    No current facility-administered medications for this encounter.     Current Outpatient Medications   Medication     sertraline (ZOLOFT) 50 MG tablet     aspirin (ASA) 81 MG chewable tablet     atropine 1 % ophthalmic solution     NUEDEXTA 20-10 MG capsule     riluzole (RILUTEK) 50 MG tablet         ALLERGIES:  No Known Allergies    FAMILY HISTORY:  Family History   Problem Relation Age of Onset     No Known Problems Mother      No Known Problems Father        SOCIAL HISTORY:   Social History     Socioeconomic History     Marital status:    Tobacco Use     Smoking status: Current Every Day Smoker     Packs/day: 1.00     Years: 40.00     Pack years: 40.00     Types: Cigarettes     Smokeless tobacco: Never Used     Tobacco comment: 10-15 per night/weekend   Substance and Sexual Activity     Alcohol use: Yes     Comment: Alcoholic Drinks/day: Occasional     Drug use: No     Sexual activity: Not Currently   Other Topics Concern     Parent/sibling w/ CABG, MI or angioplasty before 65F 55M? No       VITALS:  BP (!) 147/73   Pulse 66   Temp 98.4  F (36.9  C) (Oral)   Resp 18   Wt 54.9 kg (121 lb)   LMP  (LMP Unknown)   SpO2 97%   BMI 20.77 kg/m      PHYSICAL EXAM    Constitutional: Well developed, Well nourished, NAD.  HENT: Normocephalic, Atraumatic. Neck Supple.  Eyes: EOMI, Conjunctiva normal.  Respiratory: Breathing comfortably on room air. Speaks full sentences easily. Lungs clear to ascultation.  Cardiovascular: Normal heart rate, Regular rhythm. No peripheral edema.  Abdomen: Soft, entirely nontender.  Functional G-tube entering left upper quadrant  Musculoskeletal: Good range of  motion in all major joints. No major deformities noted.  Integument: Warm, Dry.  Neurologic: Alert & awake, Normal motor function, Normal sensory function, No focal deficits noted.   Psychiatric: Cooperative. Affect appropriate.     LAB:  All pertinent labs reviewed and interpreted.  Labs Ordered and Resulted from Time of ED Arrival to Time of ED Departure   BASIC METABOLIC PANEL - Normal       Result Value    Sodium 138      Potassium 4.4      Chloride 101      Carbon Dioxide (CO2) 28      Anion Gap 9      Urea Nitrogen 17      Creatinine 0.64      Calcium 9.5      Glucose 96      GFR Estimate >90             Delroy Betancur M.D.  Emergency Medicine  [facility] EMERGENCY DEPARTMENT  [address] [place] [id number]  [phone]  Dept: [phone]       Delroy Betancur MD  07/27/22 3908

## 2022-08-08 NOTE — TELEPHONE ENCOUNTER
Patient was last seen in April and has no follow up scheduled. Please confirm that she would like to continue care with me and I will refill. If she has transferred care elsewhere it is best that they write refills.

## 2022-09-15 NOTE — PROGRESS NOTES
CLINICAL NUTRITION SERVICES - REASSESSMENT NOTE     EVALUATION OF PREVIOUS PLAN OF CARE:   Referring Physician: Maddie  Current diet: NPO  Current appetite/intake: 5 cans Jevity 1.5 continuous from ~ 9am-6pm cycled  PEG Tube: Yes    Monitoring from previous assessment:   -Food intake - Now NPO  -Weight trends - 17% weight loss in 5 months, now weight stable  Wt Readings from Last Encounters:   07/27/22 54.9 kg (121 lb)   05/10/22 55.2 kg (121 lb 11.2 oz)   04/28/22 57.2 kg (126 lb)   12/30/21 66.4 kg (146 lb 6.4 oz)   09/23/21 68.2 kg (150 lb 6.4 oz)   06/08/21 70.3 kg (155 lb)   04/07/21 72.1 kg (159 lb)   04/01/21 72.8 kg (160 lb 8 oz)   ]  Previous Goals:   1. Aim for 5-6 small frequent meals  2. Aim for 1800kcal and 70g protein/day  3. Weight maintenance   Evaluation: Meeting calorie/protein goals. No longer taking PO     Previous Nutrition Diagnosis:    Inadequate oral intake related to dysphagia as evidenced by patient modifying food textures and choices, 21% wt loss x past 12 months   Evaluation: Improving     NEW FINDINGS:   Patient's family helps with feedings, is accustomed to pump feeding as they have another family member on pump feeds. Discussed trying bolus feeds of ~ 1 carton feeds over about an hour or turning up the feeding rate to decrease run time. Per patient's son, patient is refusing to add water to her feeds as previously recommended by RD. Discussed trying to add water flushes during cycled feed or between bolus feeds if moving to bolus. Encouraged family to ask questions if necessary.      CURRENT NUTRITION DIAGNOSIS   Inadequate oral intake related to NPO status as evidenced by patient reliant on enteral nutrition to meet needs.      INTERVENTIONS   Recommendations / Nutrition Prescription   Patient to try bolus feeds  Patient to increase free water flushes to previously recommended amount.     Implementation  EN Schedule -try bolus feeding  Feeding Tube Flush - increase flushes to previously  recommended amount    Goals  1. Pt to get ~ 1800kcal and 70g protein/day  3. Weight maintenance     Follow up/Monitoring:   Progress towards goals will be monitored and evaluated per protocol and Practice Guidelines    Anu Perez MS, RDN, LDN

## 2022-09-15 NOTE — PROGRESS NOTES
Service Date: 09/15/2022    Daniel Fortune MD   [facility]  [address]  [place] [address]    RE:      Bailey Malhotra  MRN:  [id number]  :   1955    Dear Dr. Fortune:    I met with Bailey Malhotra and her son today by virtual visit in followup of her previously diagnosed ALS.  Bailey reports the following symptoms:  She feels her weight is stable.  She is sleeping well and chooses not to use noninvasive ventilation, although I did explain the demonstrated potential benefit in ALS.  She still has some sialorrhea during the day and I encouraged her to use her atropine drops during the day.  She is currently using them only at bedtime.  She has limited mobility but is still walking with a walker.  She has right shoulder pain and left knee pain, both of which are more with activity.  I was able to see her left knee and there was no discoloration or swelling.  She continues to experience depression.  She has passive suicidal thoughts but no intent or plan and when given multiple choice, indicated that she has not acted on any thoughts, in part because of family and michael.  She declines counseling.  We also discussed the option of hospice.  She met with them in May and chose not to enroll.  She seemed similarly disinterested in a home care case, although we will ask our  to speak to her in more detail about this.      Action plan as of today is as follows:  I have suggested she use ibuprofen for her joint pain and notify me if that is not adequate.  I will make a referral to Sports Medicine for evaluation of her right shoulder and left knee.  I will increase her Zoloft gradually to 150 mg a day.  She will meet today also with our [employer] Association representative, registered dietitian, occupational therapist, physical therapist and  to address other needs.  In particular, she does have equipment needs and may benefit from a power wheelchair.  She is not using her  CoughAssist or her suction device.    Sincerely,    Phi Perkins MD      D: 09/15/2022   T: 09/15/2022   MT: angela    Name:     PAYAL JOHNSON  MRN:      [id number]        Account:      [id number]   :      1955           Service Date: 09/15/2022       Document: [id number]

## 2022-09-15 NOTE — LETTER
9/15/2022       RE: Bailey Malhotra  [address]  [facility] [address]     Dear Colleague,    Thank you for referring your patient, Bailey Malhotra, to the [facility] NEUROLOGY CLINIC [place] at [facility]. Please see a copy of my visit note below.      Service Date: 09/15/2022    Daniel Fortune MD   [facility]  [address], [facility] [address]  [place] [address]    RE:      Bailey Mlahotra  MRN:  [id number]  :   1955    Dear Dr. Fortune:    I met with Bailey Malhotra and her son today by virtual visit in followup of her previously diagnosed ALS.  Bailey reports the following symptoms:  She feels her weight is stable.  She is sleeping well and chooses not to use noninvasive ventilation, although I did explain the demonstrated potential benefit in ALS.  She still has some sialorrhea during the day and I encouraged her to use her atropine drops during the day.  She is currently using them only at bedtime.  She has limited mobility but is still walking with a walker.  She has right shoulder pain and left knee pain, both of which are more with activity.  I was able to see her left knee and there was no discoloration or swelling.  She continues to experience depression.  She has passive suicidal thoughts but no intent or plan and when given multiple choice, indicated that she has not acted on any thoughts, in part because of family and michael.  She declines counseling.  We also discussed the option of hospice.  She met with them in May and chose not to enroll.  She seemed similarly disinterested in a home care case, although we will ask our  to speak to her in more detail about this.      Action plan as of today is as follows:  I have suggested she use ibuprofen for her joint pain and notify me if that is not adequate.  I will make a referral to Sports Medicine for evaluation of her right shoulder and left knee.  I will increase her Zoloft  gradually to 150 mg a day.  She will meet today also with our ALS Association representative, registered dietitian, occupational therapist, physical therapist and  to address other needs.  In particular, she does have equipment needs and may benefit from a power wheelchair.  She is not using her CoughAssist or her suction device.    Sincerely,    Phi Perkins MD      D: 09/15/2022   T: 09/15/2022   MT: angela    Name:     PAYAL JOHNSONAlistair  MRN:      [id number]        Account:      [id number]   :      1955           Service Date: 09/15/2022       Document: [id number]

## 2022-09-15 NOTE — PROGRESS NOTES
Bailey is a 67 year old who is being evaluated via a billable video visit.      How would you like to obtain your AVS? Mail a copy  If the video visit is dropped, the invitation should be resent by: Text to cell phone: [phone]  Will anyone else be joining your video visit? No        Video-Visit Details    Platform used for Video Visit: Roxann    Chief Complaint   Patient presents with     LANETTE Small

## 2022-10-28 NOTE — PROGRESS NOTES
This is a recent snapshot of the patient's [facility] medical record.  For current drug dose and complete information and questions, call [phone] or In Basket pool, fv home infusion ([id number])  CSN Number:  [id number]

## 2022-11-26 NOTE — ED TRIAGE NOTES
Patient arrives via EMS, primary c/o new pain around established gtube site that began yesterday, some bloody drainage noted as well, this is also new; Patient is having moderate to severe abdominal pain at this time.

## 2022-11-26 NOTE — ED PROVIDER NOTES
NAME: Bailey Malhotra  AGE: 67 year old female  YOB: 1955  MRN: [id number]  EVALUATION DATE & TIME: 2022  6:06 AM    PCP: Daniel Fortune  ED PROVIDER: Lian Telles MD.    Chief Complaint   Patient presents with     Gtube Problem       FINAL IMPRESSION:  1. Abdominal pain, generalized    2. Feeding tube dysfunction, initial encounter        MEDICAL DECISION MAKIN:18 AM I met with the patient, obtained history, performed an initial exam, and discussed options and plan for diagnostics and treatment here in the ED.   8:15 AM I spoke with Dr. Valadez, IR  8:23 AM I spoke with Dr. Valadez, IR  8:42 AM I deflated the ballon per consult's recommendation   8:49 AM I spoke with Dr. Valadez, IR  9:41 AM Nurse updated stating that the patient attempted to pull out the tube herself  10:11 AM I spoke with Dr. Alonso, IR, at [facility] who is willing to see the patient to replace the GJ tube  10:17 AM I spoke to Dr. Simon, Emergency Department, at [facility] for patient transfer    ED Course as of 22 1055   Sat 2022   0628 67-year-old female with history of ALS, status post GJ tube who presents with abdominal pain.  Patient reports developed abdominal pain and some bloody drainage around her GJ tube yesterday. This was placed by IR May 2022. No fevers or vomiting and having normal stool output. Nontoxic appearing with generally reassuring vitals. Plan for pain medications, labs and CT abd/pelvis to further assess.    0754 Called by radiology-->GJ tube is slightly malpositioned. Believes the tip is in the appropriate position but the balloon is not in the actual stomach. I will discuss with IR.   0821 Discussed with IR who will review imaging.   0825 IR recommends deflate balloon and advance tube 3 cm and xray with contrast water soluble (gastrografin) (G port). If doesn't work still likely okay to manage at outpatient.       0825 IR recommends deflate balloon and advance tube 3 cm  and xray with contrast water soluble (gastrografin) (G port).      0845 Deflated balloon and she had significant improvement in pain. Advanced tube 3 cm and reinflated the balloon and she had significant increase in pain again. Advanced even farther than this and still had pain with inflation. Left balloon deflated and will discuss with IR again   1035 Xray showed contrast in stomach and proximal stomach.  Plan was for discharge however patient then was angry/agitated and pulled tube out multiple cms after xray. I talked to IR at [facility] and will send over there for replacement today. ED physician Dr. Simon accepted transfer.     Medical Decision Making    History:    Supplemental history from: N/A    External Record(s) reviewed: Documented in HPI, if applicable. (Details documented in chart).    Work Up:    Chart documentation includes differential considered and any EKGs or imaging interpreted by provider.    In additional to work up documented, I considered the following work up: See chart documentation, if applicable.    External consultation:    Discussion of management with another provider: See chart documentation, if applicable    Discussed with radiology regarding test interpretation: CT Results    Complicating factors:    Care impacted by chronic illness: Other: ALS    Care affected by social determinants of health: N/A    Disposition considerations: Transfer to [facility] Emergency Room    MEDICATIONS GIVEN IN THE EMERGENCY:  Medications   HYDROmorphone (PF) (DILAUDID) injection 0.5 mg (0.5 mg Intravenous Given 11/26/22 0640)   iopamidol (ISOVUE-370) solution 100 mL (100 mLs Intravenous Given 11/26/22 0730)   diatrizoate meglumine-sodium (GASTROGRAFIN/GASTROVIEW) 66-10 % solution 120 mL (40 mLs Oral Given 11/26/22 0921)       NEW PRESCRIPTIONS STARTED AT [facility]'S ER VISIT:  New Prescriptions    No medications on file         =================================================================  HPI    Patient information was obtained from: Patient   Use of : N/A       Bailey Malhotra is a 67 year old female with a past medical history of ALS, s/p GJ tube placement 5/9/22, who presents with gtube site associated pain.    Per chart review, patient was evaluated at [facility] ED on 7/27/22 for a Gtube problem. The tube had became clotted and the patient's family had been unable to flush it. In triage, the nurse was able to troubleshoot and flush the tube. Patient and family discharged home in good condition.     Per chart review, patient was admitted to [facility] from 5/5/22 to 5/12/22 with worsening ALS with poor oral intake and generalized weakness. Patient had been initially resistant to feeding tube, but became agreeable with placement of a feeding tube. Gastrojejunostomy was placed on 5/9/22 with no complications. Patient discharged home with plan to follow up with PCP and neurology.      Patient reports yesterday developed new pain around her established GJ tube with associated bloody drainage. She notes that she is having moderate to severe pain at this time and all of the abdominal pain is localized around the site. Patient endorses the ability to have bowel movements. She states that the tube was replaced ~10 months ago. Patient denies vomiting, diarrhea, black or bloody stools, fever, dysuria, chest pain, or additional symptoms or complaints at this time.       REVIEW OF SYSTEMS   Review of Systems   Constitutional: Negative for fever.   Cardiovascular: Negative for chest pain.   Gastrointestinal: Positive for abdominal pain (around GJ tube with bloody discharge). Negative for blood in stool, diarrhea and vomiting.   Genitourinary: Negative for dysuria.   All other systems reviewed and are negative.       PAST MEDICAL HISTORY:  Past Medical History:   Diagnosis Date     Cholelithiasis      GERD  (gastroesophageal reflux disease)        PAST SURGICAL HISTORY:  Past Surgical History:   Procedure Laterality Date     IR GASTRO JEJUNOSTOMY TUBE PLACEMENT  5/9/2022     TUBAL LIGATION         CURRENT MEDICATIONS:    No current facility-administered medications for this encounter.    Current Outpatient Medications:      aspirin (ASA) 81 MG chewable tablet, 1 tablet (81 mg) by Oral or Feeding Tube route daily, Disp: , Rfl:      atropine 1 % ophthalmic solution, place 1-2 drops under tongue or inside cheek 2 times daily as needed for excess saliva, Disp: 5 mL, Rfl: 1     NUEDEXTA 20-10 MG capsule, 1 capsule by Oral or Feeding Tube route daily, Disp: , Rfl:      riluzole (RILUTEK) 50 MG tablet, 1 tablet (50 mg) by Oral or Feeding Tube route every 12 hours, Disp: 60 tablet, Rfl: 2     sertraline (ZOLOFT) 50 MG tablet, Take 3 tablets daily per G Tube route, Disp: 90 tablet, Rfl: 0    ALLERGIES:  No Known Allergies    FAMILY HISTORY:  Family History   Problem Relation Age of Onset     No Known Problems Mother      No Known Problems Father        SOCIAL HISTORY:   Social History     Socioeconomic History     Marital status:    Tobacco Use     Smoking status: Every Day     Packs/day: 1.00     Years: 40.00     Pack years: 40.00     Types: Cigarettes     Smokeless tobacco: Never     Tobacco comments:     10-15 per night/weekend   Substance and Sexual Activity     Alcohol use: Yes     Comment: Alcoholic Drinks/day: Occasional     Drug use: No     Sexual activity: Not Currently   Other Topics Concern     Parent/sibling w/ CABG, MI or angioplasty before 65F 55M? No       PHYSICAL EXAM:    Vitals: BP (!) 163/97   Pulse 95   Temp 97.3  F (36.3  C)   Resp 20   LMP  (LMP Unknown)   SpO2 92%    Constitutional: Well developed, well nourished. No acute distress  HENT: Normocephalic, atraumatic, mucous membranes moist. Neck-gross ROM intact.   Eyes: Pupils mid-range, sclera white, no discharge  Respiratory: CTAB, no  respiratory distress, no wheezing  Cardiovascular: Normal heart rate, regular rhythm  GI: Soft, not distended, GJ tube in place with some mild serosanguinous drainage and erythema. She is very tender to this area of her abdomen.  Musculoskeletal: Moving all 4 extremities intentionally and without pain. No obvious deformity.  Skin: Warm, dry, no rash.  Neurologic: Alert & oriented, gives thumbs up/down and uses ipad to communicate, moving all extremities     LAB:  All pertinent labs reviewed and interpreted.  Labs Ordered and Resulted from Time of ED Arrival to Time of ED Departure   CBC WITH PLATELETS - Abnormal       Result Value    WBC Count 8.2      RBC Count 4.14      Hemoglobin 12.9      Hematocrit 41.2            MCH 31.2      MCHC 31.3 (*)     RDW 13.5      Platelet Count 220     BASIC METABOLIC PANEL - Normal    Sodium 142      Potassium 4.3      Chloride 101      Carbon Dioxide (CO2) 29      Anion Gap 12      Urea Nitrogen 19      Creatinine 0.62      Calcium 9.7      Glucose 108      GFR Estimate >90     HEPATIC FUNCTION PANEL - Normal    Bilirubin Total 0.3      Bilirubin Direct 0.1      Protein Total 7.7      Albumin 3.7      Alkaline Phosphatase 119      AST 32      ALT 38     LIPASE - Normal    Lipase 21         RADIOLOGY:  XR Abdomen Port 1 View   Final Result   IMPRESSION: Contrast opacification of the stomach and proximal duodenum. The balloon is not visualized, possibly in the distal stomach obscured by contrast material. Tip in the proximal jejunum is unchanged.      Excreted contrast material also noted in the renal collecting system from CT earlier in the day. Nonobstructive bowel gas pattern.      CT Abdomen Pelvis w Contrast   Final Result   IMPRESSION:       1.  The GJ tube balloon is closely approximated to the ventral abdominal wall and there is concern for malpositioning of the balloon outside of the gastric lumen. Recommend IR / fluoroscopic consult.      2.  The remaining  gastrojejunostomy tube is unremarkable with the tip terminating in the normal location of the proximal jejunum.      3.  Minimal stranding along the ventral abdominal wall near the GJ tube balloon and probable scant fluid. No collection or abscess.      4.  No free air.         Findings verbally excess with Dr. Telles in the ER at 7:57 AM on 11/26/2020.             EKG:   N/A    PROCEDURES:   Procedures     I, Beck Hobson, am serving as a scribe to document services personally performed by Dr. Lian Telles based on my observation and the provider's statements to me. I, Lian Telles MD attest that Beck Hobson is acting in a scribe capacity, has observed my performance of the services and has documented them in accordance with my direction.    Lian Telles M.D.  Emergency Medicine  [facility] EMERGENCY ROOM  [address] [place] [id number] [phone]  Dept: [phone]     Lian Telles MD  11/26/22 1056

## 2022-11-26 NOTE — ED PROVIDER NOTES
EMERGENCY DEPARTMENT ENCOUNTER      NAME: Bailey Malhotra  AGE: 67 year old female  YOB: 1955  MRN: [id number]  EVALUATION DATE & TIME: 11/26/2022  1:07 PM    PCP: Daniel Fortune    ED PROVIDER: Huy Simon M.D.      Chief Complaint   Patient presents with     GJ tube         FINAL IMPRESSION:  1. Encounter for gastrojejunal (GJ) tube placement          ED COURSE & MEDICAL DECISION MAKING:    Pertinent Labs & Imaging studies reviewed. (See chart for details)  ED Course as of 11/26/22 1402   Sat Nov 26, 2022   1315 Patient is a 67-year-old woman who [facility] procedure today.  She has ALS, is dependent on GJ tube.  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  IR recommended transfer here so that they could replace it.  She is here with her son.  IR will be paged.  Plan to discharge after procedure.   GJ tube placement complete, discharged      Additional ED Course Timestamps:  1:11 PM I met with the patient, obtained history, performed an initial exam, and discussed options and plan for diagnostics and treatment here in the ED.    At the conclusion of the encounter I discussed the results of all of the tests and the disposition. The questions were answered. The patient or family acknowledged understanding and was agreeable with the care plan.         MEDICATIONS GIVEN IN THE EMERGENCY:  Medications   iohexol (OMNIPAQUE) 350 MG/ML injectable solution 100 mL (10 mLs Intravenous Given 11/26/22 1351)         NEW PRESCRIPTIONS STARTED AT TODAY'S ER VISIT  New Prescriptions    No medications on file          =================================================================    HPI    Patient information was obtained from: triage note and referral note    Use of : N/A        Bailey Malhotra is a 67 year old female with a pertinent history of overactive bladder and ASL who presents to this ED for evaluation of a GJ tube problem.    Per triage note,  the patient was seen at [facility] ED earlier today, where the patient pulled out her GJ tube. Staff were unable to get it back in, so the patient was transferred to [facility] to be seen by IR for replacement. Patient is nonverbal and uses a tablet to communicate.     Per referral note, the patient uses GJ tube for hydration, nutrition, and medications. While at [facility] earlier today, tube fell out. Staff tried to replace it but it fell out again. [facility] IR recommended transfer to [facility] to have GJ tube replaced under fluoroscopy.          REVIEW OF SYSTEMS   Review of Systems   Gastrointestinal:        Positive for displaced GJ tube.   All other systems reviewed and are negative.      PAST MEDICAL HISTORY:  Past Medical History:   Diagnosis Date     Cholelithiasis      GERD (gastroesophageal reflux disease)        PAST SURGICAL HISTORY:  Past Surgical History:   Procedure Laterality Date     IR GASTRO JEJUNOSTOMY TUBE CHANGE  11/26/2022     IR GASTRO JEJUNOSTOMY TUBE PLACEMENT  5/9/2022     TUBAL LIGATION             CURRENT MEDICATIONS:    No current facility-administered medications for this encounter.     Current Outpatient Medications   Medication     aspirin (ASA) 81 MG chewable tablet     atropine 1 % ophthalmic solution     NUEDEXTA 20-10 MG capsule     riluzole (RILUTEK) 50 MG tablet     sertraline (ZOLOFT) 50 MG tablet       ALLERGIES:  No Known Allergies    FAMILY HISTORY:  Family History   Problem Relation Age of Onset     No Known Problems Mother      No Known Problems Father        SOCIAL HISTORY:   Social History     Socioeconomic History     Marital status:    Tobacco Use     Smoking status: Every Day     Packs/day: 1.00     Years: 40.00     Pack years: 40.00     Types: Cigarettes     Smokeless tobacco: Never     Tobacco comments:     10-15 per night/weekend   Substance and Sexual Activity     Alcohol use: Yes     Comment: Alcoholic Drinks/day: Occasional     Drug use: No     Sexual  activity: Not Currently   Other Topics Concern     Parent/sibling w/ CABG, MI or angioplasty before 65F 55M? No       VITALS:  BP (!) 158/75 (BP Location: Left arm)   Pulse 81   Temp 98.6  F (37  C) (Oral)   Resp 16   Wt 54.4 kg (119 lb 14.9 oz)   LMP  (LMP Unknown)   SpO2 95%   BMI 19.36 kg/m      PHYSICAL EXAM    Constitutional: frail. Sitting up in bed. Nonverbal.   HENT: Normocephalic, atraumatic, mucous membranes dry, nose normal  Eyes: PERRL, EOMI, conjunctiva normal, no discharge.  Neck- Supple, gross ROM intact.   Respiratory: Normal work of breathing.  Cardiovascular: Normal heart rate  Abdomen: GJ tube taped to abdomen.   Musculoskeletal: Lower extremity weakness.   Neurologic: Alert & oriented x 3, communicates using electronic device.  Psychiatric: Affect normal, cooperative.       LAB:  All pertinent labs reviewed and interpreted.  Labs Ordered and Resulted from Time of ED Arrival to Time of ED Departure - No data to display    RADIOLOGY:  Reviewed all pertinent imaging. Please see official radiology report.  IR Gastro Jejunostomy Tube Change   Final Result   IMPRESSION:     Gastrojejunostomy tube change as discussed above.             EKG:    All EKG interpretations will be found in ED course above.    PROCEDURES:   None      I, Argenis Liu am serving as a scribe to document services personally performed by Dr. Huy Simon based on my observation and the provider's statements to me. IHuy MD attest that Argenis Liu is acting in a scribe capacity, has observed my performance of the services and has documented them in accordance with my direction.    Huy Simon M.D.  Emergency Medicine  [facility] EMERGENCY DEPARTMENT  [address] [facility] [id number] [phone]  Dept: [phone]     Huy Simon MD  11/26/22 8972

## 2022-11-26 NOTE — PROGRESS NOTES
Pt returned to ED 16.  Bedside handoff given to Surinder RN.  Son at bedside and updated on procedure and follow-up.  Pt added to [facility] rounding for 3 month follow-up appointment.  
detailed exam

## 2022-11-26 NOTE — ED NOTES
Expected Patient Referral to ER    10:22 AM    Referring clinic/provider: MD Koki@ [facility]    Reason for referral: Patient is ALS, dependent on GJ tube for hydration, nutrition, medications.  Was displaced, efforts were made to replace it, but it became dislodged again.  IR was involved and recommends, here to [facility] to have it replaced under fluoroscopy.  Patient would be suitable for discharge after successful procedure.    Mode of arrival: Wheelchair transport       Huy Simon MD  11/26/22 7388

## 2022-11-26 NOTE — DISCHARGE INSTRUCTIONS
Your GJ tube balloon was in the wrong spot causing your pain. We do not have IR available to replace this today. I was unable to reinflate the balloon as it is causing you pain. The tube still looks to be in the right spot and you can use it for feedings/meds. However this is very high risk for coming out of position and we need to keep it secure and prevent it from getting pulled.     Call [phone] on Monday at 8 am and they will get you in for replacement  If you have fevers, increased pain, tube moves/falls out or other concerns prior to then return to the Emergency Room.    158

## 2022-11-26 NOTE — ED TRIAGE NOTES
She comes from [facility] ER for a hew GJ tube placed. Apparently while in the ER at [facility] she pulled the tube out. Now staff unable to get it back in. Is her for IR to replace it. She is non-verbal but uses sign language. I asked her if she would like an ASL interpretor and she declined. She uses a tablet to communicate.

## 2022-11-26 NOTE — ED NOTES
Pt pulled the Gtube out during a dressing change. Provider notified. Area cleaned and reinforced.

## 2022-11-26 NOTE — PROCEDURES
Interventional Radiology Post-Procedure Note     ?   Brief Procedure Note:   Patient name: Bailey Malhotra  Pt MRN:[id number]   Date of procedure: 11/26/2022     Procedure(s): Exchange of Gastrojejunostomy feeding tube  Sedation method: None. The patient was monitored by an interventional radiology nurse at all times throughout the procedure under my direct guidance.  Pre Procedure Diagnosis: Pain  Post Procedure Diagnosis: Same  Indications: Need for exchange of existing gastrojejunostomy tube for continued feeding/oral medication   ?   Attending: Daniel Lyle M.D.  Specimen(s) removed: None   Additional studies ordered: None  Drains/Tubes: 18 Fr 45 cm JAYESH Gastrojejunostomy feeding tube.  Estimated Blood Loss: Minimal  Complications: None  Vascular closure method: N/A    Findings/Notes/Comments: Uncomplicated exchange of Gastrojejunostomy feeding tube. New feeding tube in appropriate position. Both the G port and the J port may be used immediately.   ?   Please see dictation in PACS or under the Imaging tab in [facility] for detailed procedure note.     Daniel Lyle M.D.   Vascular and Interventional Radiology   Pager: [phone]   After Hours / Scheduling: [phone]     11/26/2022  1:57 PM

## 2022-11-27 NOTE — TELEPHONE ENCOUNTER
9:26 pm - 9:33 pm - Attempted follow up call x2. No contact. Voice message left to identified voice mailbox - Call Nurse line back if still needing assistance.    Enid Zambrano RN  [facility]

## 2022-11-27 NOTE — TELEPHONE ENCOUNTER
Nurse Triage SBAR    Situation:   -G-tube follow up    Background:   -Son yung calling, It is okay to leave a detailed message at this number.   -Consent to communicate on file    Assessment:   -Seen in [place] ED today, then then transferred to [place] for replacement of her  GJ tube today at 1400  -Her J tube is hooked up to feeds and working well  -They gave her meds via the G tube (in about 55cc fluid) and most of the liquid came out from around the tube (the liquid had a blue medication in it  -No resistance in the G tube  -He was able to aspirate air and stomach contents  -No pain   -No fever    -Medications given tonight: ASA, Zoloft, Guaifenesin     Recommendation:   -[facility] had the [facility] on-call paged (Dr johnson) to the patient at 1955   -They should call the patient back directly in 10-15 min  -if they do not the patient will call [phone]  to have them re-paged  -Call [facility] back if you are unable to reach them or if you have any other questions or concerns.    [facility] internal number: [phone]    [facility] RN handoff to RN Payal at 2029      Reason for Disposition    [1] Caller has URGENT question AND [2] triager unable to answer question    Additional Information    Negative: Chest pain    Negative: Difficulty breathing    Negative: Acting confused (e.g., disoriented, slurred speech) or excessively sleepy    Negative: Post-Op tonsil and adenoid surgery, symptoms or questions about    Negative: Surgical incision symptoms and questions    Negative: [1] Pain or burning with passing urine (urination) AND [2] male    Negative: [1] Pain or burning with passing urine (urination) AND [2] female    Negative: Constipation    Negative: New or worsening leg (calf, thigh) pain    Negative: New or worsening leg swelling    Negative: Dizziness is severe, or persists > 24 hours after surgery    Negative: Pain, redness, swelling, or pus at IV Site    Negative: Symptoms arising from use of a urinary catheter  (e.g., coude, Koehler)    Negative: Cast problems or questions    Negative: Medication question    Negative: [1] Widespread rash AND [2] bright red, sunburn-like    Negative: [1] SEVERE headache AND [2] after spinal (epidural) anesthesia    Negative: [1] Vomiting AND [2] persists > 4 hours    Negative: [1] Vomiting AND [2] abdomen looks much more swollen than usual    Negative: Patient sounds very sick or weak to the triager    Negative: [1] Drinking very little AND [2] dehydration suspected (e.g., no urine > 12 hours, very dry mouth, very lightheaded)    Negative: Sounds like a serious complication to the triager    Negative: Fever > 100.4 F (38.0 C)    Negative: [1] SEVERE post-op pain (e.g., excruciating, pain scale 8-10) AND [2] not controlled with pain medications    Protocols used: POST-OP SYMPTOMS AND [id number]

## 2022-11-28 NOTE — ED PROVIDER NOTES
EMERGENCY DEPARTMENT ENCOUNTER      NAME: Bailey Malhotra  AGE: 67 year old female  YOB: 1955  MRN: [id number]  EVALUATION DATE & TIME: No admission date for patient encounter.    PCP: Daniel Fortune    ED PROVIDER: Delroy Betancur M.D.      Chief Complaint   Patient presents with     Gtube Problem         FINAL IMPRESSION:  1. Problem with gastrostomy tube (H)    2. Encounter for palliative care          ED COURSE & MEDICAL DECISION MAKIN:06 PM I met with the patient, obtained history, performed an initial exam, and discussed options and plan for diagnostics and treatment here in the ED.   1:19 PM I rechecked and updated the patient. Patient ready for discharge.    67 year old female presents to the Emergency Department for evaluation of gastrostomy concerns and palliative care planning.  She recently had a GJ tube replaced.  They have been having some issues with pain around the site however it seems to be working at the bedside, flushes easily, and tube feeds have been able to be administered.  She is frustrated by her advancing terminal illness and came here essentially to get her tube out and enroll in hospice.  I did meet with her here with her care manager and hospice referrals were made for the patient and family.  At this point I would strongly prefer to keep the GJ tube in place in the event that it is needed for important medications like pain medications.  It is really up to the patient and her family if they want to continue tube feedings or other medications, until completing hospice enrollment at this would still be advisable.  Patient and family were comfortable with this plan.  And will think there is any indication for further medical work-up at this time based on her goals of care and current exam.    At the conclusion of the encounter I discussed the results of all of the tests and the disposition. The questions were answered. The patient or family acknowledged  understanding and was agreeable with the care plan.         Medical Decision Making    History:    Supplemental history from: Family Member/Significant Other    External Record(s) reviewed: Outpatient Record: Recent hospital records for GJ tube concerns    Work Up:    Chart documentation includes differential considered and any EKGs or imaging interpreted by provider.    In additional to work up documented, I considered the following work up: Imaging CT, but deferred due to Reassuring exam, goals of care.    External consultation:    Discussion of management with another provider: See chart documentation, if applicable    Complicating factors:    Care impacted by chronic illness: Other: Neurodegenerative disease    Care affected by social determinants of health: N/A    Disposition considerations: Discharge. No recommendations on prescription strength medication(s). I did not consider admission.         MEDICATIONS GIVEN IN THE EMERGENCY:  Medications - No data to display    NEW PRESCRIPTIONS STARTED AT TODAY'S ER VISIT  Discharge Medication List as of 11/28/2022  1:21 PM             =================================================================    HPI    Patient information was obtained from: Daughter    Use of : N/A         Bailey Malhotra is a 67 year old female with a pertinent history of ALS, GERD, cholelithiasis, s/p IR Gastro jejunostomy tube change (11/26/2022), who presents to this ED via walk-in for evaluation of a G-tube problem.    Per daughter, patient has advanced ALS, has recently become non-ambulatory and has been non-verbal for a few years now. Patient was here on Saturday and had her G-tube replaced. The patient would like the G-tube removed and to be put on hospice care. They have been working with Dr. Barron, neurology, and Dr. Fortune, her PCP. They got a referral in May or June, but the patient wanted to keep trying. Now that the patient cannot walk or talk, she would like to receive  hospice care. The site around the g-tube is uncomfortable, so she would like it removed. Daughter notes they have been unable to use the g-tube for administering medication as it is leaking, though they have been able to use it for feedings. The patient's son lives with her and does the feedings and medication. No other current complaints.    REVIEW OF SYSTEMS   All systems reviewed and negative except as noted in HPI.    PAST MEDICAL HISTORY:  Past Medical History:   Diagnosis Date     Cholelithiasis      GERD (gastroesophageal reflux disease)        PAST SURGICAL HISTORY:  Past Surgical History:   Procedure Laterality Date     IR GASTRO JEJUNOSTOMY TUBE CHANGE  11/26/2022     IR GASTRO JEJUNOSTOMY TUBE PLACEMENT  5/9/2022     TUBAL LIGATION             CURRENT MEDICATIONS:    No current facility-administered medications for this encounter.     Current Outpatient Medications   Medication     aspirin (ASA) 81 MG chewable tablet     atropine 1 % ophthalmic solution     NUEDEXTA 20-10 MG capsule     riluzole (RILUTEK) 50 MG tablet     sertraline (ZOLOFT) 50 MG tablet         ALLERGIES:  No Known Allergies    FAMILY HISTORY:  Family History   Problem Relation Age of Onset     No Known Problems Mother      No Known Problems Father        SOCIAL HISTORY:   Social History     Socioeconomic History     Marital status:    Tobacco Use     Smoking status: Every Day     Packs/day: 1.00     Years: 40.00     Pack years: 40.00     Types: Cigarettes     Smokeless tobacco: Never     Tobacco comments:     10-15 per night/weekend   Substance and Sexual Activity     Alcohol use: Yes     Comment: Alcoholic Drinks/day: Occasional     Drug use: No     Sexual activity: Not Currently   Other Topics Concern     Parent/sibling w/ CABG, MI or angioplasty before 65F 55M? No       VITALS:  BP (!) 146/86   Pulse 94   Temp 99.2  F (37.3  C) (Temporal)   Resp 22   Wt 54 kg (119 lb)   LMP  (LMP Unknown)   SpO2 96%   BMI 19.21 kg/m       PHYSICAL EXAM    Constitutional: Thin frail chronically ill-appearing middle-age female patient, sitting up in chair, no acute distress  HENT: Normocephalic, Atraumatic. Neck Supple.  Eyes: EOMI, Conjunctiva normal.  Respiratory: Breathing comfortably on room air. Lungs clear to ascultation.  Cardiovascular: Normal heart rate, Regular rhythm. No peripheral edema.  Abdomen: Soft, nontender.  There is a GJ tube entering in the left upper quadrant.  There is some mild irritation of the skin surrounding this but no cellulitic change.  No drainage.  Musculoskeletal: Good range of motion in all major joints. No major deformities noted.  Integument: Warm, Dry.  Neurologic: Awake and alert, interactive, aphasic, severe weakness in all 4 extremities, nonambulatory  Psychiatric: Cooperative. Affect appropriate.         I, Alondra Rowe, am serving as a scribe to document services personally performed by Dr. Delroy Betancur, based on my observation and the provider's statements to me. IDelroy MD attest that Alondra Rowe is acting in a scribe capacity, has observed my performance of the services and has documented them in accordance with my direction.    Delroy Betancur M.D.  Emergency Medicine  [facility] EMERGENCY DEPARTMENT  [facility] [facility] [id number] [phone]  Dept: [phone]     Delroy Betancur MD  11/28/22 3416

## 2022-11-28 NOTE — DISCHARGE INSTRUCTIONS
You were seen in the emergency department for concerns about getting enrolled in hospice.  Your tube appears to be working well enough that we can continue to get the medications through it.  It is up to you if you want to continue tube feeds, we would not recommend removing the GJ tube entirely to make sure that we are able to give you important medications like pain medications as you enroll in hospice.  We did place a referral to the [facility] hospice agency and they should be contacting you as soon as tomorrow to get an intake evaluation set up.  We would recommend updating your primary clinic about the developments today to make sure they can continue following any additional concerns

## 2022-11-28 NOTE — ED TRIAGE NOTES
Patient is here requesting feeding tube to be removed. She has advanced ALS and would like to seek hospice care at this time.

## 2024-05-14 NOTE — ED NOTES
Pt had trip and fall hitting his head on a toilet. Denies LOC. Fall witnessed. Hx traumatic brain bleeds    Is on plavix.   Report given to JOSEFINA Roque
